# Patient Record
Sex: OTHER/UNKNOWN | URBAN - NONMETROPOLITAN AREA
[De-identification: names, ages, dates, MRNs, and addresses within clinical notes are randomized per-mention and may not be internally consistent; named-entity substitution may affect disease eponyms.]

---

## 2019-10-03 ENCOUNTER — LAB REQUISITION (OUTPATIENT)
Dept: LAB | Facility: HOSPITAL | Age: 24
End: 2019-10-03

## 2019-10-03 DIAGNOSIS — Z00.00 ROUTINE GENERAL MEDICAL EXAMINATION AT A HEALTH CARE FACILITY: ICD-10-CM

## 2019-10-03 LAB
CHOLEST SERPL-MCNC: 165 MG/DL (ref 0–200)
GLUCOSE P FAST SERPL-MCNC: 135 MG/DL (ref 65–99)
HDLC SERPL-MCNC: 38 MG/DL (ref 40–60)
LDLC SERPL CALC-MCNC: 47 MG/DL (ref 0–100)
LDLC/HDLC SERPL: 1.24 {RATIO}
TRIGL SERPL-MCNC: 399 MG/DL (ref 0–150)
VLDLC SERPL-MCNC: 79.8 MG/DL

## 2019-10-03 PROCEDURE — 80061 LIPID PANEL: CPT | Performed by: ORTHOPAEDIC SURGERY

## 2019-10-03 PROCEDURE — 82947 ASSAY GLUCOSE BLOOD QUANT: CPT | Performed by: ORTHOPAEDIC SURGERY

## 2021-07-02 ENCOUNTER — HOSPITAL ENCOUNTER (EMERGENCY)
Dept: HOSPITAL 79 - ER1 | Age: 26
Discharge: HOME | End: 2021-07-02
Payer: COMMERCIAL
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
## 2021-07-02 DIAGNOSIS — F17.200: ICD-10-CM

## 2021-07-02 DIAGNOSIS — L81.8: ICD-10-CM

## 2021-07-02 DIAGNOSIS — S50.812A: Primary | ICD-10-CM

## 2022-03-06 ENCOUNTER — HOSPITAL ENCOUNTER (EMERGENCY)
Dept: HOSPITAL 79 - ER1 | Age: 27
Discharge: HOME | End: 2022-03-06
Payer: SELF-PAY

## 2022-03-06 DIAGNOSIS — F17.200: ICD-10-CM

## 2022-03-06 DIAGNOSIS — R68.84: Primary | ICD-10-CM

## 2022-06-23 ENCOUNTER — HOSPITAL ENCOUNTER (EMERGENCY)
Facility: HOSPITAL | Age: 27
Discharge: HOME | End: 2022-06-23
Attending: STUDENT IN AN ORGANIZED HEALTH CARE EDUCATION/TRAINING PROGRAM | Admitting: STUDENT IN AN ORGANIZED HEALTH CARE EDUCATION/TRAINING PROGRAM

## 2022-06-23 VITALS
DIASTOLIC BLOOD PRESSURE: 88 MMHG | WEIGHT: 190 LBS | BODY MASS INDEX: 28.14 KG/M2 | SYSTOLIC BLOOD PRESSURE: 153 MMHG | HEIGHT: 69 IN | HEART RATE: 85 BPM | TEMPERATURE: 98 F | RESPIRATION RATE: 16 BRPM | OXYGEN SATURATION: 98 %

## 2022-06-23 DIAGNOSIS — F20.9 SCHIZOPHRENIA, UNSPECIFIED TYPE: Primary | ICD-10-CM

## 2022-06-23 LAB
AMPHET+METHAMPHET UR QL: NEGATIVE
AMPHETAMINES UR QL: NEGATIVE
BARBITURATES UR QL SCN: NEGATIVE
BENZODIAZ UR QL SCN: NEGATIVE
BILIRUB UR QL STRIP: NEGATIVE
BUPRENORPHINE SERPL-MCNC: NEGATIVE NG/ML
CANNABINOIDS SERPL QL: POSITIVE
CLARITY UR: CLEAR
COCAINE UR QL: NEGATIVE
COLOR UR: ABNORMAL
FLUAV SUBTYP SPEC NAA+PROBE: NOT DETECTED
FLUBV RNA ISLT QL NAA+PROBE: NOT DETECTED
GLUCOSE UR STRIP-MCNC: NEGATIVE MG/DL
HGB UR QL STRIP.AUTO: NEGATIVE
KETONES UR QL STRIP: ABNORMAL
LEUKOCYTE ESTERASE UR QL STRIP.AUTO: NEGATIVE
METHADONE UR QL SCN: NEGATIVE
NITRITE UR QL STRIP: NEGATIVE
OPIATES UR QL: NEGATIVE
OXYCODONE UR QL SCN: NEGATIVE
PCP UR QL SCN: NEGATIVE
PH UR STRIP.AUTO: 6 [PH] (ref 5–8)
PROPOXYPH UR QL: NEGATIVE
PROT UR QL STRIP: NEGATIVE
SARS-COV-2 RNA PNL SPEC NAA+PROBE: NOT DETECTED
SP GR UR STRIP: 1.02 (ref 1–1.03)
TRICYCLICS UR QL SCN: NEGATIVE
UROBILINOGEN UR QL STRIP: ABNORMAL

## 2022-06-23 PROCEDURE — C9803 HOPD COVID-19 SPEC COLLECT: HCPCS | Performed by: PHYSICIAN ASSISTANT

## 2022-06-23 PROCEDURE — 99283 EMERGENCY DEPT VISIT LOW MDM: CPT

## 2022-06-23 PROCEDURE — 80306 DRUG TEST PRSMV INSTRMNT: CPT | Performed by: PHYSICIAN ASSISTANT

## 2022-06-23 PROCEDURE — 87636 SARSCOV2 & INF A&B AMP PRB: CPT | Performed by: PHYSICIAN ASSISTANT

## 2022-06-23 PROCEDURE — 81003 URINALYSIS AUTO W/O SCOPE: CPT | Performed by: PHYSICIAN ASSISTANT

## 2022-06-23 NOTE — NURSING NOTE
Called and spoke to . Intake information provided. Instructed that he does not need admitted and to attempt to call the [facility] for an appt for him to be seen outpatient. [id number]

## 2022-06-23 NOTE — NURSING NOTE
Med asst talking with patient at this time. Patient states that the insurance is his big problem to pay for outpatient tx and medication. He states that he was trying to work a little bit. But has not worked in a while.

## 2022-06-23 NOTE — NURSING NOTE
Spoke with patient. He states that he knows that he needs to see a therapist to get his meds and states he only has medicare part A and the only way his insurance will pay for him to get back on meds is if he goes inpatient. It does not cover outpatient and cant afford it. States that is the reason he signed into the ER. Informed patient that in order to get admitted inpt he has to meet admission criteria and for med management that is done outpatient. Appt for July is made and offered to let him talk to med asst. Informed patient that the provider is not admitting him and encouraged him to keep this appt or contact [facility] care if he wishes to.

## 2022-06-23 NOTE — NURSING NOTE
Spoke to the [facility]. For medication the appts are booked till October. He will need to see a therapist first. Appt set for July 5th 1pm with  alyssia lott. Information verbalized. Will provider appt information to the patient.

## 2022-06-23 NOTE — NURSING NOTE
Patient presents to intake and states that he would like to get help to get back on medications. He state that he lost his insurance a little over a years ago and was going to comp care and just had to quit going and he has been off all his meds for that long. He recently got insurance and would like to get started back on his meds but does not want to use comp care so he came to the [facility] to get started back on them. He was on Zyprexa, Zoloft, methamphetamine and Buspar. Patient is calm at this time. No reports of SI or HI or AVH. He says that sometimes he has bad paranoia and has trouble knowing what is real and what is not. And depression is up and down. But denies anything today. Patient states that he would like to get started with the [facility] to get on medication but does not know how to do it.

## 2022-06-23 NOTE — ED PROVIDER NOTES
Subjective   27-year-old white male presents secondary to need for psychiatric medications.  Patient states he has been off his psych medications for quite a while.  He states he has bipolar and schizophrenia.  He states that he has no suicidal homicidal ideation.  Denies any hallucinations.  He states he is very anxious.  He does not wish to be admitted to the hospital.  He denies any medical complaints.  No exposure to COVID or COVID symptoms.  His symptoms mild to moderate.  No relieving or exacerbating factors otherwise.          Review of Systems   Constitutional: Negative.  Negative for fever.   HENT: Negative.    Respiratory: Negative.    Cardiovascular: Negative.  Negative for chest pain.   Gastrointestinal: Negative.  Negative for abdominal pain.   Endocrine: Negative.    Genitourinary: Negative.  Negative for dysuria.   Skin: Negative.    Neurological: Negative.    Psychiatric/Behavioral: Negative.  Negative for suicidal ideas.   All other systems reviewed and are negative.      Past Medical History:   Diagnosis Date   • ADHD (attention deficit hyperactivity disorder)    • Bipolar disorder (HCC)    • PTSD (post-traumatic stress disorder)    • Schizoaffective disorder (HCC)        No Known Allergies    No past surgical history on file.    No family history on file.    Social History     Socioeconomic History   • Marital status: Single   Tobacco Use   • Smoking status: Current Every Day Smoker     Years: 10.00     Types: Cigarettes   • Smokeless tobacco: Never Used   Vaping Use   • Vaping Use: Never used   Substance and Sexual Activity   • Alcohol use: Yes     Comment: occaisional use   • Drug use: Not Currently   • Sexual activity: Yes     Partners: Female     Birth control/protection: None           Objective   Physical Exam  Vitals and nursing note reviewed.   Constitutional:       General: He is not in acute distress.     Appearance: He is well-developed. He is not diaphoretic.   HENT:      Head:  Normocephalic and atraumatic.      Right Ear: External ear normal.      Left Ear: External ear normal.      Nose: Nose normal.   Eyes:      Conjunctiva/sclera: Conjunctivae normal.      Pupils: Pupils are equal, round, and reactive to light.   Neck:      Vascular: No JVD.      Trachea: No tracheal deviation.   Cardiovascular:      Rate and Rhythm: Normal rate and regular rhythm.      Heart sounds: Normal heart sounds. No murmur heard.  Pulmonary:      Effort: Pulmonary effort is normal. No respiratory distress.      Breath sounds: Normal breath sounds. No wheezing.   Abdominal:      General: Bowel sounds are normal.      Palpations: Abdomen is soft.      Tenderness: There is no abdominal tenderness.   Musculoskeletal:         General: No deformity. Normal range of motion.      Cervical back: Normal range of motion and neck supple.   Skin:     General: Skin is warm and dry.      Coloration: Skin is not pale.      Findings: No erythema or rash.   Neurological:      Mental Status: He is alert and oriented to person, place, and time.      Cranial Nerves: No cranial nerve deficit.   Psychiatric:         Behavior: Behavior normal.         Thought Content: Thought content normal. Thought content does not include homicidal or suicidal ideation.         Procedures           ED Course                                           MDM  Number of Diagnoses or Management Options  Schizophrenia, unspecified type (HCC): established and worsening     Amount and/or Complexity of Data Reviewed  Clinical lab tests: ordered and reviewed        Final diagnoses:   Schizophrenia, unspecified type (HCC)       ED Disposition  ED Disposition     ED Disposition   Discharge    Condition   Stable    Comment   --             CHARLENE CLEMENTS  [address] [place] [id number]  [phone]  Schedule an appointment as soon as possible for a visit on 7/5/2022  at 1 pm         Medication List      No changes were made to your prescriptions  during this visit.          Ayad Zee PA  06/23/22 1200

## 2023-01-13 ENCOUNTER — HOSPITAL ENCOUNTER (EMERGENCY)
Facility: HOSPITAL | Age: 28
Discharge: PSYCHIATRIC OR SUBSTANCE USE FACILITY | End: 2023-01-14
Attending: EMERGENCY MEDICINE | Admitting: EMERGENCY MEDICINE

## 2023-01-13 DIAGNOSIS — F29 PSYCHOSIS, UNSPECIFIED PSYCHOSIS TYPE: Primary | ICD-10-CM

## 2023-01-13 DIAGNOSIS — R45.851 SUICIDAL IDEATIONS: ICD-10-CM

## 2023-01-13 LAB
ALBUMIN SERPL-MCNC: 4.8 G/DL (ref 3.5–5.2)
ALBUMIN/GLOB SERPL: 1.8 G/DL
ALP SERPL-CCNC: 128 U/L (ref 39–117)
ALT SERPL W P-5'-P-CCNC: 69 U/L (ref 1–41)
AMPHET+METHAMPHET UR QL: NEGATIVE
AMPHETAMINES UR QL: NEGATIVE
ANION GAP SERPL CALCULATED.3IONS-SCNC: 16 MMOL/L (ref 5–15)
AST SERPL-CCNC: 36 U/L (ref 1–40)
BARBITURATES UR QL SCN: NEGATIVE
BASOPHILS # BLD AUTO: 0.04 10*3/MM3 (ref 0–0.2)
BASOPHILS NFR BLD AUTO: 0.4 % (ref 0–1.5)
BENZODIAZ UR QL SCN: NEGATIVE
BILIRUB SERPL-MCNC: 0.3 MG/DL (ref 0–1.2)
BILIRUB UR QL STRIP: NEGATIVE
BUN SERPL-MCNC: 10 MG/DL (ref 6–20)
BUN/CREAT SERPL: 11.2 (ref 7–25)
BUPRENORPHINE SERPL-MCNC: NEGATIVE NG/ML
CALCIUM SPEC-SCNC: 9.6 MG/DL (ref 8.6–10.5)
CANNABINOIDS SERPL QL: NEGATIVE
CHLORIDE SERPL-SCNC: 101 MMOL/L (ref 98–107)
CLARITY UR: CLEAR
CO2 SERPL-SCNC: 20 MMOL/L (ref 22–29)
COCAINE UR QL: NEGATIVE
COLOR UR: YELLOW
CREAT SERPL-MCNC: 0.89 MG/DL (ref 0.76–1.27)
DEPRECATED RDW RBC AUTO: 38.4 FL (ref 37–54)
EGFRCR SERPLBLD CKD-EPI 2021: 122 ML/MIN/1.73
EOSINOPHIL # BLD AUTO: 0.12 10*3/MM3 (ref 0–0.4)
EOSINOPHIL NFR BLD AUTO: 1.3 % (ref 0.3–6.2)
ERYTHROCYTE [DISTWIDTH] IN BLOOD BY AUTOMATED COUNT: 11.9 % (ref 12.3–15.4)
ETHANOL BLD-MCNC: 190 MG/DL (ref 0–10)
ETHANOL UR QL: 0.19 %
FLUAV RNA RESP QL NAA+PROBE: NOT DETECTED
FLUBV RNA RESP QL NAA+PROBE: NOT DETECTED
GLOBULIN UR ELPH-MCNC: 2.7 GM/DL
GLUCOSE SERPL-MCNC: 122 MG/DL (ref 65–99)
GLUCOSE UR STRIP-MCNC: NEGATIVE MG/DL
HCT VFR BLD AUTO: 47.6 % (ref 37.5–51)
HGB BLD-MCNC: 16.5 G/DL (ref 13–17.7)
HGB UR QL STRIP.AUTO: NEGATIVE
IMM GRANULOCYTES # BLD AUTO: 0.03 10*3/MM3 (ref 0–0.05)
IMM GRANULOCYTES NFR BLD AUTO: 0.3 % (ref 0–0.5)
KETONES UR QL STRIP: NEGATIVE
LEUKOCYTE ESTERASE UR QL STRIP.AUTO: NEGATIVE
LYMPHOCYTES # BLD AUTO: 3.36 10*3/MM3 (ref 0.7–3.1)
LYMPHOCYTES NFR BLD AUTO: 36.6 % (ref 19.6–45.3)
MAGNESIUM SERPL-MCNC: 2.1 MG/DL (ref 1.6–2.6)
MCH RBC QN AUTO: 31 PG (ref 26.6–33)
MCHC RBC AUTO-ENTMCNC: 34.7 G/DL (ref 31.5–35.7)
MCV RBC AUTO: 89.3 FL (ref 79–97)
METHADONE UR QL SCN: NEGATIVE
MONOCYTES # BLD AUTO: 0.52 10*3/MM3 (ref 0.1–0.9)
MONOCYTES NFR BLD AUTO: 5.7 % (ref 5–12)
NEUTROPHILS NFR BLD AUTO: 5.11 10*3/MM3 (ref 1.7–7)
NEUTROPHILS NFR BLD AUTO: 55.7 % (ref 42.7–76)
NITRITE UR QL STRIP: NEGATIVE
NRBC BLD AUTO-RTO: 0 /100 WBC (ref 0–0.2)
OPIATES UR QL: NEGATIVE
OXYCODONE UR QL SCN: NEGATIVE
PCP UR QL SCN: NEGATIVE
PH UR STRIP.AUTO: 6 [PH] (ref 5–8)
PLATELET # BLD AUTO: 259 10*3/MM3 (ref 140–450)
PMV BLD AUTO: 9.3 FL (ref 6–12)
POTASSIUM SERPL-SCNC: 3.8 MMOL/L (ref 3.5–5.2)
PROPOXYPH UR QL: NEGATIVE
PROT SERPL-MCNC: 7.5 G/DL (ref 6–8.5)
PROT UR QL STRIP: NEGATIVE
RBC # BLD AUTO: 5.33 10*6/MM3 (ref 4.14–5.8)
SARS-COV-2 RNA RESP QL NAA+PROBE: NOT DETECTED
SODIUM SERPL-SCNC: 137 MMOL/L (ref 136–145)
SP GR UR STRIP: <=1.005 (ref 1–1.03)
TRICYCLICS UR QL SCN: NEGATIVE
UROBILINOGEN UR QL STRIP: NORMAL
WBC NRBC COR # BLD: 9.18 10*3/MM3 (ref 3.4–10.8)

## 2023-01-13 PROCEDURE — 99285 EMERGENCY DEPT VISIT HI MDM: CPT

## 2023-01-13 PROCEDURE — 80053 COMPREHEN METABOLIC PANEL: CPT | Performed by: EMERGENCY MEDICINE

## 2023-01-13 PROCEDURE — 36415 COLL VENOUS BLD VENIPUNCTURE: CPT

## 2023-01-13 PROCEDURE — 25010000002 LORAZEPAM PER 2 MG: Performed by: FAMILY MEDICINE

## 2023-01-13 PROCEDURE — 96372 THER/PROPH/DIAG INJ SC/IM: CPT

## 2023-01-13 PROCEDURE — 81003 URINALYSIS AUTO W/O SCOPE: CPT | Performed by: EMERGENCY MEDICINE

## 2023-01-13 PROCEDURE — 87636 SARSCOV2 & INF A&B AMP PRB: CPT | Performed by: EMERGENCY MEDICINE

## 2023-01-13 PROCEDURE — C9803 HOPD COVID-19 SPEC COLLECT: HCPCS

## 2023-01-13 PROCEDURE — 83735 ASSAY OF MAGNESIUM: CPT | Performed by: EMERGENCY MEDICINE

## 2023-01-13 PROCEDURE — 85025 COMPLETE CBC W/AUTO DIFF WBC: CPT | Performed by: EMERGENCY MEDICINE

## 2023-01-13 PROCEDURE — 25010000002 DIPHENHYDRAMINE PER 50 MG: Performed by: FAMILY MEDICINE

## 2023-01-13 PROCEDURE — 80306 DRUG TEST PRSMV INSTRMNT: CPT | Performed by: EMERGENCY MEDICINE

## 2023-01-13 PROCEDURE — 25010000002 HALOPERIDOL LACTATE PER 5 MG: Performed by: FAMILY MEDICINE

## 2023-01-13 PROCEDURE — 82077 ASSAY SPEC XCP UR&BREATH IA: CPT | Performed by: EMERGENCY MEDICINE

## 2023-01-13 RX ORDER — LORAZEPAM 2 MG/ML
2 INJECTION INTRAMUSCULAR ONCE
Status: COMPLETED | OUTPATIENT
Start: 2023-01-13 | End: 2023-01-13

## 2023-01-13 RX ORDER — HALOPERIDOL 5 MG/ML
5 INJECTION INTRAMUSCULAR ONCE
Status: COMPLETED | OUTPATIENT
Start: 2023-01-13 | End: 2023-01-13

## 2023-01-13 RX ORDER — DIPHENHYDRAMINE HYDROCHLORIDE 50 MG/ML
50 INJECTION INTRAMUSCULAR; INTRAVENOUS ONCE
Status: COMPLETED | OUTPATIENT
Start: 2023-01-13 | End: 2023-01-13

## 2023-01-13 RX ADMIN — DIPHENHYDRAMINE HYDROCHLORIDE 50 MG: 50 INJECTION INTRAMUSCULAR; INTRAVENOUS at 21:54

## 2023-01-13 RX ADMIN — HALOPERIDOL LACTATE 5 MG: 5 INJECTION, SOLUTION INTRAMUSCULAR at 21:54

## 2023-01-13 RX ADMIN — LORAZEPAM 2 MG: 2 INJECTION INTRAMUSCULAR; INTRAVENOUS at 21:54

## 2023-01-14 ENCOUNTER — HOSPITAL ENCOUNTER (INPATIENT)
Facility: HOSPITAL | Age: 28
LOS: 3 days | Discharge: HOME | DRG: 885 | End: 2023-01-17
Attending: STUDENT IN AN ORGANIZED HEALTH CARE EDUCATION/TRAINING PROGRAM | Admitting: STUDENT IN AN ORGANIZED HEALTH CARE EDUCATION/TRAINING PROGRAM
Payer: MEDICARE

## 2023-01-14 VITALS
HEART RATE: 95 BPM | WEIGHT: 210 LBS | SYSTOLIC BLOOD PRESSURE: 114 MMHG | TEMPERATURE: 97.8 F | RESPIRATION RATE: 18 BRPM | HEIGHT: 70 IN | DIASTOLIC BLOOD PRESSURE: 75 MMHG | BODY MASS INDEX: 30.06 KG/M2 | OXYGEN SATURATION: 96 %

## 2023-01-14 PROBLEM — R45.851 SUICIDAL IDEATION: Status: ACTIVE | Noted: 2023-01-14

## 2023-01-14 LAB
ETHANOL BLD-MCNC: 128 MG/DL (ref 0–10)
ETHANOL BLD-MCNC: 83 MG/DL (ref 0–10)
ETHANOL UR QL: 0.08 %
ETHANOL UR QL: 0.13 %
QT INTERVAL: 372 MS
QTC INTERVAL: 415 MS

## 2023-01-14 PROCEDURE — 93010 ELECTROCARDIOGRAM REPORT: CPT | Performed by: SPECIALIST

## 2023-01-14 PROCEDURE — HZ2ZZZZ DETOXIFICATION SERVICES FOR SUBSTANCE ABUSE TREATMENT: ICD-10-PCS | Performed by: PSYCHIATRY & NEUROLOGY

## 2023-01-14 PROCEDURE — 82077 ASSAY SPEC XCP UR&BREATH IA: CPT | Performed by: EMERGENCY MEDICINE

## 2023-01-14 PROCEDURE — 99223 1ST HOSP IP/OBS HIGH 75: CPT | Performed by: PSYCHIATRY & NEUROLOGY

## 2023-01-14 PROCEDURE — 93005 ELECTROCARDIOGRAM TRACING: CPT | Performed by: STUDENT IN AN ORGANIZED HEALTH CARE EDUCATION/TRAINING PROGRAM

## 2023-01-14 RX ORDER — HYDROXYZINE HYDROCHLORIDE 25 MG/1
50 TABLET, FILM COATED ORAL EVERY 6 HOURS PRN
Status: DISCONTINUED | OUTPATIENT
Start: 2023-01-14 | End: 2023-01-17 | Stop reason: HOSPADM

## 2023-01-14 RX ORDER — IBUPROFEN 400 MG/1
400 TABLET ORAL EVERY 6 HOURS PRN
Status: DISCONTINUED | OUTPATIENT
Start: 2023-01-14 | End: 2023-01-17 | Stop reason: HOSPADM

## 2023-01-14 RX ORDER — MULTIVITAMIN WITH IRON
2 TABLET ORAL DAILY
Status: DISCONTINUED | OUTPATIENT
Start: 2023-01-14 | End: 2023-01-17 | Stop reason: HOSPADM

## 2023-01-14 RX ORDER — BENZTROPINE MESYLATE 1 MG/1
2 TABLET ORAL ONCE AS NEEDED
Status: DISCONTINUED | OUTPATIENT
Start: 2023-01-14 | End: 2023-01-17 | Stop reason: HOSPADM

## 2023-01-14 RX ORDER — LORAZEPAM 1 MG/1
1 TABLET ORAL
Status: DISCONTINUED | OUTPATIENT
Start: 2023-01-17 | End: 2023-01-16

## 2023-01-14 RX ORDER — MULTIPLE VITAMINS W/ MINERALS TAB 9MG-400MCG
1 TAB ORAL DAILY
Status: DISCONTINUED | OUTPATIENT
Start: 2023-01-14 | End: 2023-01-17 | Stop reason: HOSPADM

## 2023-01-14 RX ORDER — ONDANSETRON 4 MG/1
4 TABLET, FILM COATED ORAL EVERY 6 HOURS PRN
Status: DISCONTINUED | OUTPATIENT
Start: 2023-01-14 | End: 2023-01-17 | Stop reason: HOSPADM

## 2023-01-14 RX ORDER — ECHINACEA PURPUREA EXTRACT 125 MG
2 TABLET ORAL AS NEEDED
Status: DISCONTINUED | OUTPATIENT
Start: 2023-01-14 | End: 2023-01-17 | Stop reason: HOSPADM

## 2023-01-14 RX ORDER — LORAZEPAM 2 MG/1
2 TABLET ORAL
Status: DISPENSED | OUTPATIENT
Start: 2023-01-15 | End: 2023-01-16

## 2023-01-14 RX ORDER — ALUMINA, MAGNESIA, AND SIMETHICONE 2400; 2400; 240 MG/30ML; MG/30ML; MG/30ML
15 SUSPENSION ORAL EVERY 6 HOURS PRN
Status: DISCONTINUED | OUTPATIENT
Start: 2023-01-14 | End: 2023-01-17 | Stop reason: HOSPADM

## 2023-01-14 RX ORDER — BENZTROPINE MESYLATE 1 MG/ML
1 INJECTION INTRAMUSCULAR; INTRAVENOUS ONCE AS NEEDED
Status: DISCONTINUED | OUTPATIENT
Start: 2023-01-14 | End: 2023-01-17 | Stop reason: HOSPADM

## 2023-01-14 RX ORDER — LORAZEPAM 2 MG/1
2 TABLET ORAL EVERY 4 HOURS PRN
Status: ACTIVE | OUTPATIENT
Start: 2023-01-15 | End: 2023-01-16

## 2023-01-14 RX ORDER — TRAZODONE HYDROCHLORIDE 50 MG/1
50 TABLET ORAL NIGHTLY PRN
Status: DISCONTINUED | OUTPATIENT
Start: 2023-01-14 | End: 2023-01-17 | Stop reason: HOSPADM

## 2023-01-14 RX ORDER — FAMOTIDINE 20 MG/1
20 TABLET, FILM COATED ORAL 2 TIMES DAILY PRN
Status: DISCONTINUED | OUTPATIENT
Start: 2023-01-14 | End: 2023-01-17 | Stop reason: HOSPADM

## 2023-01-14 RX ORDER — NICOTINE 21 MG/24HR
1 PATCH, TRANSDERMAL 24 HOURS TRANSDERMAL
Status: DISCONTINUED | OUTPATIENT
Start: 2023-01-14 | End: 2023-01-17 | Stop reason: HOSPADM

## 2023-01-14 RX ORDER — LOPERAMIDE HYDROCHLORIDE 2 MG/1
2 CAPSULE ORAL
Status: DISCONTINUED | OUTPATIENT
Start: 2023-01-14 | End: 2023-01-17 | Stop reason: HOSPADM

## 2023-01-14 RX ORDER — LORAZEPAM 0.5 MG/1
0.5 TABLET ORAL EVERY 4 HOURS PRN
Status: DISCONTINUED | OUTPATIENT
Start: 2023-01-18 | End: 2023-01-16

## 2023-01-14 RX ORDER — LORAZEPAM 2 MG/1
2 TABLET ORAL
Status: DISPENSED | OUTPATIENT
Start: 2023-01-14 | End: 2023-01-15

## 2023-01-14 RX ORDER — BENZONATATE 100 MG/1
100 CAPSULE ORAL 3 TIMES DAILY PRN
Status: DISCONTINUED | OUTPATIENT
Start: 2023-01-14 | End: 2023-01-17 | Stop reason: HOSPADM

## 2023-01-14 RX ORDER — LORAZEPAM 1 MG/1
1 TABLET ORAL EVERY 4 HOURS PRN
Status: DISCONTINUED | OUTPATIENT
Start: 2023-01-17 | End: 2023-01-16

## 2023-01-14 RX ORDER — ACETAMINOPHEN 325 MG/1
650 TABLET ORAL EVERY 6 HOURS PRN
Status: DISCONTINUED | OUTPATIENT
Start: 2023-01-14 | End: 2023-01-14

## 2023-01-14 RX ORDER — LORAZEPAM 0.5 MG/1
0.5 TABLET ORAL
Status: DISCONTINUED | OUTPATIENT
Start: 2023-01-18 | End: 2023-01-16

## 2023-01-14 RX ADMIN — LORAZEPAM 2 MG: 2 TABLET ORAL at 15:11

## 2023-01-14 RX ADMIN — LORAZEPAM 2 MG: 2 TABLET ORAL at 20:17

## 2023-01-14 RX ADMIN — TRAZODONE HYDROCHLORIDE 50 MG: 50 TABLET ORAL at 20:17

## 2023-01-14 RX ADMIN — Medication 1 TABLET: at 09:20

## 2023-01-14 RX ADMIN — NICOTINE TRANSDERMAL SYSTEM 1 PATCH: 21 PATCH, EXTENDED RELEASE TRANSDERMAL at 09:20

## 2023-01-14 RX ADMIN — Medication 2 TABLET: at 09:19

## 2023-01-14 RX ADMIN — Medication 100 MG: at 09:20

## 2023-01-14 NOTE — PLAN OF CARE
Goal Outcome Evaluation:  Plan of Care Reviewed With: patient  Patient Agreement with Plan of Care: agrees        Outcome Evaluation: Pt new admission this shift.  Pt  begin on ativan detox this shift no complaints.

## 2023-01-14 NOTE — NURSING NOTE
Attempted to search pt per hospital policy, pt became belligerent with staff and began cursing staff. Attempted to deescalate patients behavior. Patient then threw his hat at Security and jumped toward them attempting to throw punches. Patient was placed in a brief hold and medications were given per ED provider order.     Hold began at 2154.

## 2023-01-14 NOTE — NURSING NOTE
Spoke to Dr. Krishnan via phone. Intake information provided. Instructed to admit the patient. Admit orders received. SP3 routine orders with [facility]WA. [id number]. Patient and ED provider Miguel A made aware of plan of care. Safety precautions maintained.

## 2023-01-14 NOTE — NURSING NOTE
Brief hold ended at this time. There are no s/s of injury and no complaints at this time. Patient is apologetic with staff and reports he knows why he was placed in a hold.

## 2023-01-14 NOTE — ED PROVIDER NOTES
"Subjective   History of Present Illness  Patient is a 27-year-old male who reports a psychiatric history, possibly schizophrenia, states he has not he has not been on any of his medications \"for a long time\", at least several months, as he has no health insurance and he cannot afford them.  He states that he has been having thoughts of harming or killing himself, has had thoughts of harming others but no person in particular, admits to auditory hallucinations, possibly visual hallucinations.  I am not exactly sure of the circumstances of his arrival here, but he tells me that he called the police because he felt like he was a danger to himself and possibly others, and that the police made him come here.  Upon his arrival, before I saw him, the patient was very agitated and combative and had to be briefly restrained by security.  He is much calmer and more cooperative a few minutes later when I see him.  He admits to alcohol use, does not quantitate.  He denies drug use.  He denies any other symptoms or complaints.        Review of Systems   All other systems reviewed and are negative.      Past Medical History:   Diagnosis Date   • ADHD (attention deficit hyperactivity disorder)    • Bipolar disorder (HCC)    • Psychiatric illness    • PTSD (post-traumatic stress disorder)    • Schizoaffective disorder (HCC)    • Suicide attempt (HCC)        No Known Allergies    Past Surgical History:   Procedure Laterality Date   • TONSILLECTOMY AND ADENOIDECTOMY         Family History   Problem Relation Age of Onset   • Depression Father        Social History     Socioeconomic History   • Marital status: Single   • Number of children: 1   • Years of education: ged   Tobacco Use   • Smoking status: Every Day     Packs/day: 1.00     Years: 10.00     Pack years: 10.00     Types: Cigarettes   • Smokeless tobacco: Never   Vaping Use   • Vaping Use: Never used   Substance and Sexual Activity   • Alcohol use: Yes     Comment: 5th " whiskey every 1-2 days   • Drug use: Not Currently     Types: Marijuana   • Sexual activity: Not Currently     Partners: Female     Birth control/protection: None           Objective   Physical Exam  Vitals and nursing note reviewed.   Constitutional:       General: He is not in acute distress.     Appearance: He is well-developed. He is not toxic-appearing or diaphoretic.      Comments: Well-developed well-nourished male, awake and alert.  He appears to be a bit agitated and potentially volatile.  However, he is seated and cooperative with history and physical exam.   HENT:      Head: Normocephalic and atraumatic.   Eyes:      General: No scleral icterus.     Pupils: Pupils are equal, round, and reactive to light.   Neck:      Trachea: No tracheal deviation.   Cardiovascular:      Rate and Rhythm: Normal rate and regular rhythm.   Pulmonary:      Effort: Pulmonary effort is normal. No respiratory distress.      Breath sounds: Normal breath sounds.   Chest:      Chest wall: No tenderness.   Abdominal:      General: Bowel sounds are normal.      Palpations: Abdomen is soft.      Tenderness: There is no abdominal tenderness. There is no guarding or rebound.   Musculoskeletal:         General: No tenderness. Normal range of motion.      Cervical back: Normal range of motion and neck supple. No rigidity or tenderness.      Right lower leg: No edema.      Left lower leg: No edema.   Skin:     General: Skin is warm and dry.      Capillary Refill: Capillary refill takes less than 2 seconds.      Coloration: Skin is not pale.   Neurological:      General: No focal deficit present.      Mental Status: He is alert and oriented to person, place, and time.      GCS: GCS eye subscore is 4. GCS verbal subscore is 5. GCS motor subscore is 6.      Motor: No abnormal muscle tone.   Psychiatric:      Comments: Mental status is described above.         Procedures  Results for orders placed or performed during the hospital encounter of  01/13/23   COVID-19 and FLU A/B PCR - Swab, Nasopharynx    Specimen: Nasopharynx; Swab   Result Value Ref Range    COVID19 Not Detected Not Detected - Ref. Range    Influenza A PCR Not Detected Not Detected    Influenza B PCR Not Detected Not Detected   Comprehensive Metabolic Panel    Specimen: Arm, Right; Blood   Result Value Ref Range    Glucose 122 (H) 65 - 99 mg/dL    BUN 10 6 - 20 mg/dL    Creatinine 0.89 0.76 - 1.27 mg/dL    Sodium 137 136 - 145 mmol/L    Potassium 3.8 3.5 - 5.2 mmol/L    Chloride 101 98 - 107 mmol/L    CO2 20.0 (L) 22.0 - 29.0 mmol/L    Calcium 9.6 8.6 - 10.5 mg/dL    Total Protein 7.5 6.0 - 8.5 g/dL    Albumin 4.8 3.5 - 5.2 g/dL    ALT (SGPT) 69 (H) 1 - 41 U/L    AST (SGOT) 36 1 - 40 U/L    Alkaline Phosphatase 128 (H) 39 - 117 U/L    Total Bilirubin 0.3 0.0 - 1.2 mg/dL    Globulin 2.7 gm/dL    A/G Ratio 1.8 g/dL    BUN/Creatinine Ratio 11.2 7.0 - 25.0    Anion Gap 16.0 (H) 5.0 - 15.0 mmol/L    eGFR 122.0 >60.0 mL/min/1.73   Urinalysis With Microscopic If Indicated (No Culture) - Urine, Clean Catch    Specimen: Urine, Clean Catch   Result Value Ref Range    Color, UA Yellow Yellow, Straw    Appearance, UA Clear Clear    pH, UA 6.0 5.0 - 8.0    Specific Gravity, UA <=1.005 1.005 - 1.030    Glucose, UA Negative Negative    Ketones, UA Negative Negative    Bilirubin, UA Negative Negative    Blood, UA Negative Negative    Protein, UA Negative Negative    Leuk Esterase, UA Negative Negative    Nitrite, UA Negative Negative    Urobilinogen, UA 0.2 E.U./dL 0.2 - 1.0 E.U./dL   Urine Drug Screen - Urine, Clean Catch    Specimen: Urine, Clean Catch   Result Value Ref Range    THC, Screen, Urine Negative Negative    Phencyclidine (PCP), Urine Negative Negative    Cocaine Screen, Urine Negative Negative    Methamphetamine, Ur Negative Negative    Opiate Screen Negative Negative    Amphetamine Screen, Urine Negative Negative    Benzodiazepine Screen, Urine Negative Negative    Tricyclic Antidepressants  Screen Negative Negative    Methadone Screen, Urine Negative Negative    Barbiturates Screen, Urine Negative Negative    Oxycodone Screen, Urine Negative Negative    Propoxyphene Screen Negative Negative    Buprenorphine, Screen, Urine Negative Negative   Magnesium    Specimen: Arm, Right; Blood   Result Value Ref Range    Magnesium 2.1 1.6 - 2.6 mg/dL   Ethanol    Specimen: Arm, Right; Blood   Result Value Ref Range    Ethanol 190 (H) 0 - 10 mg/dL    Ethanol % 0.190 %   CBC Auto Differential    Specimen: Arm, Right; Blood   Result Value Ref Range    WBC 9.18 3.40 - 10.80 10*3/mm3    RBC 5.33 4.14 - 5.80 10*6/mm3    Hemoglobin 16.5 13.0 - 17.7 g/dL    Hematocrit 47.6 37.5 - 51.0 %    MCV 89.3 79.0 - 97.0 fL    MCH 31.0 26.6 - 33.0 pg    MCHC 34.7 31.5 - 35.7 g/dL    RDW 11.9 (L) 12.3 - 15.4 %    RDW-SD 38.4 37.0 - 54.0 fl    MPV 9.3 6.0 - 12.0 fL    Platelets 259 140 - 450 10*3/mm3    Neutrophil % 55.7 42.7 - 76.0 %    Lymphocyte % 36.6 19.6 - 45.3 %    Monocyte % 5.7 5.0 - 12.0 %    Eosinophil % 1.3 0.3 - 6.2 %    Basophil % 0.4 0.0 - 1.5 %    Immature Grans % 0.3 0.0 - 0.5 %    Neutrophils, Absolute 5.11 1.70 - 7.00 10*3/mm3    Lymphocytes, Absolute 3.36 (H) 0.70 - 3.10 10*3/mm3    Monocytes, Absolute 0.52 0.10 - 0.90 10*3/mm3    Eosinophils, Absolute 0.12 0.00 - 0.40 10*3/mm3    Basophils, Absolute 0.04 0.00 - 0.20 10*3/mm3    Immature Grans, Absolute 0.03 0.00 - 0.05 10*3/mm3    nRBC 0.0 0.0 - 0.2 /100 WBC   Ethanol    Specimen: Arm, Right; Blood   Result Value Ref Range    Ethanol 128 (H) 0 - 10 mg/dL    Ethanol % 0.128 %   Ethanol    Specimen: Arm, Left; Blood   Result Value Ref Range    Ethanol 83 (H) 0 - 10 mg/dL    Ethanol % 0.083 %              ED Course  ED Course as of 01/14/23 0623   Sat Jan 14, 2023   0343 Psychiatry intake advises that the patient's alcohol level must be documented to be below 100.  I have a repeat level ordered for 4 AM. [CM]   0500 Alcohol level is now 83.  Patient medically clear  for psychiatry. [CM]      ED Course User Index  [CM] John Grady MD                                           [facility]    Final diagnoses:   Psychosis, unspecified psychosis type (HCC)   Suicidal ideations       ED Disposition  ED Disposition     ED Disposition   DC/Transfer to Behavioral Health    Condition   Stable    Comment   --             Please note that portions of this note were completed with a voice recognition program.            John Grady MD  01/14/23 0660

## 2023-01-14 NOTE — NURSING NOTE
2158 Brief hold over one hour face to face completed. No complains of pain, no injuries noted. resp even unlabored. Vital sign WNL. He was able to tell staff the reason for brief hold. He apologized to staff after hold was over. Dr Grady made aware.
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
## 2023-01-15 PROCEDURE — 99232 SBSQ HOSP IP/OBS MODERATE 35: CPT | Performed by: PSYCHIATRY & NEUROLOGY

## 2023-01-15 PROCEDURE — 63710000001 ONDANSETRON PER 8 MG: Performed by: STUDENT IN AN ORGANIZED HEALTH CARE EDUCATION/TRAINING PROGRAM

## 2023-01-15 RX ORDER — OLANZAPINE 5 MG/1
5 TABLET ORAL NIGHTLY
Status: DISCONTINUED | OUTPATIENT
Start: 2023-01-15 | End: 2023-01-17 | Stop reason: HOSPADM

## 2023-01-15 RX ADMIN — Medication 2 TABLET: at 08:12

## 2023-01-15 RX ADMIN — OLANZAPINE 5 MG: 5 TABLET, FILM COATED ORAL at 20:11

## 2023-01-15 RX ADMIN — LORAZEPAM 2 MG: 2 TABLET ORAL at 08:11

## 2023-01-15 RX ADMIN — Medication 100 MG: at 08:12

## 2023-01-15 RX ADMIN — LORAZEPAM 2 MG: 2 TABLET ORAL at 21:08

## 2023-01-15 RX ADMIN — NICOTINE TRANSDERMAL SYSTEM 1 PATCH: 21 PATCH, EXTENDED RELEASE TRANSDERMAL at 08:11

## 2023-01-15 RX ADMIN — IBUPROFEN 400 MG: 400 TABLET, FILM COATED ORAL at 14:06

## 2023-01-15 RX ADMIN — TRAZODONE HYDROCHLORIDE 50 MG: 50 TABLET ORAL at 21:08

## 2023-01-15 RX ADMIN — LORAZEPAM 2 MG: 2 TABLET ORAL at 14:06

## 2023-01-15 RX ADMIN — ONDANSETRON HYDROCHLORIDE 4 MG: 4 TABLET, FILM COATED ORAL at 09:29

## 2023-01-15 RX ADMIN — Medication 1 TABLET: at 08:12

## 2023-01-15 NOTE — PLAN OF CARE
Problem: Adult Behavioral Health Plan of Care  Goal: Plan of Care Review  Outcome: Ongoing, Progressing  Flowsheets  Taken 1/15/2023 1354  Progress: improving  Outcome Evaluation: PATIENT VERBALIZES MILD ANXIETY AND MODERATE DEPRESSION THIS SHIFT. CRAVING (4) AND VARIOUS S/S OF WITHDRAWAL. PATIENT IS AOX3, COOPERATIVE WITH NO S/S OF ACUTE DISTRESS NOTED  Taken 1/15/2023 0800  Plan of Care Reviewed With: patient  Patient Agreement with Plan of Care: agrees   Goal Outcome Evaluation:  Plan of Care Reviewed With: patient  Patient Agreement with Plan of Care: agrees     Progress: improving  Outcome Evaluation: PATIENT VERBALIZES MILD ANXIETY AND MODERATE DEPRESSION THIS SHIFT. CRAVING (4) AND VARIOUS S/S OF WITHDRAWAL. PATIENT IS AOX3, COOPERATIVE WITH NO S/S OF ACUTE DISTRESS NOTED

## 2023-01-15 NOTE — PLAN OF CARE
Goal Outcome Evaluation:  Plan of Care Reviewed With: patient  Patient Agreement with Plan of Care: agrees     Pt states anxiety 2, depression 2. Pt is calm and cooperative with staff, med compliant.

## 2023-01-15 NOTE — PROGRESS NOTES
"INPATIENT PSYCHIATRIC PROGRESS NOTE    Name:  Eduardo Cannon  :  1995  MRN:  [id number]  Visit Number:  [id number]  Length of stay:  1    SUBJECTIVE    CC/Focus of Exam: bipolar, alcohol    INTERVAL HISTORY:  The patient reports he is feeling some better. States he was able to sleep good last night. Reports no active withdrawals.   Depression rating 2/10  Anxiety rating 6/10  Sleep: good  Withdrawal sx: none  Cravin/10    Review of Systems   Constitutional: Negative.    Respiratory: Negative.    Cardiovascular: Negative.    Gastrointestinal: Negative.        OBJECTIVE    Temp:  [97.1 °F (36.2 °C)-99.5 °F (37.5 °C)] 97.1 °F (36.2 °C)  Heart Rate:  [] 93  Resp:  [18] 18  BP: (113-162)/(59-99) 154/95    MENTAL STATUS EXAM:  Appearance:Casually dressed, good hygeine.   Cooperation:Cooperative  Psychomotor: No psychomotor agitation/retardation, No EPS, No motor tics  Speech-normal rate, amount.  Mood \"better\"   Affect-congruent, appropriate, stable  Thought Content-goal directed, no delusional material present  Thought process-linear, organized.  Suicidality: No SI  Homicidality: No HI  Perception: No AH/VH  Insight-fair   Judgement-fair    Lab Results (last 24 hours)     ** No results found for the last 24 hours. **             Imaging Results (Last 24 Hours)     ** No results found for the last 24 hours. **             ECG/EMG Results (most recent)     Procedure Component Value Units Date/Time    ECG 12 Lead Other [[id number]] Collected: 23 0757     Updated: 23 1324     QT Interval 372 ms      QTC Interval 415 ms     Narrative:      Test Reason : Baseline Cardiac Status~  Blood Pressure :   */*   mmHG  Vent. Rate :  75 BPM     Atrial Rate :  75 BPM     P-R Int : 142 ms          QRS Dur :  88 ms      QT Int : 372 ms       P-R-T Axes :  39  65  21 degrees     QTc Int : 415 ms    Normal sinus rhythm with sinus arrhythmia  Normal ECG  No previous ECGs available  Confirmed by Jere Carvalho " (2028) on 1/14/2023 1:23:58 PM    Referred By: HANSEL           Confirmed By: Jere Carvalho           ALLERGIES: Patient has no known allergies.    Medication Review:   Scheduled Medications:  B-complex with vitamin C, 2 tablet, Oral, Daily  LORazepam, 2 mg, Oral, 3 times per day   Followed by  [START ON 1/16/2023] LORazepam, 1.5 mg, Oral, 3 times per day   Followed by  [START ON 1/17/2023] LORazepam, 1 mg, Oral, 3 times per day   Followed by  [START ON 1/18/2023] LORazepam, 0.5 mg, Oral, 3 times per day  multivitamin with minerals, 1 tablet, Oral, Daily  nicotine, 1 patch, Transdermal, Q24H  thiamine, 100 mg, Oral, Daily         PRN Medications:  •  aluminum-magnesium hydroxide-simethicone  •  benzonatate  •  benztropine **OR** benztropine  •  famotidine  •  hydrOXYzine  •  ibuprofen  •  loperamide  •  LORazepam **FOLLOWED BY** [START ON 1/16/2023] LORazepam **FOLLOWED BY** [START ON 1/17/2023] LORazepam **FOLLOWED BY** [START ON 1/18/2023] LORazepam  •  magnesium hydroxide  •  ondansetron  •  sodium chloride  •  traZODone   All medications reviewed.    ASSESSMENT & PLAN:      Suicidal ideation  -SP 3       Bipolar I disorder, most recent episode mixed, severe with psychotic features (HCC)  -Start olanzapine 5 mg bedtime       Alcohol use disorder, severe, dependence (HCC)  -Ativan detox  -Thiamine and folate       Nicotine use disorder  -Nicotine replacement    Special precautions: Special Precautions Level 3 (q15 min checks) .    Behavioral Health Treatment Plan and Problem List: I have reviewed and approved the Behavioral Health Treatment Plan and Problem list.  The patient has had a chance to review and agrees with the treatment plan.     Clinician:  Kaelyn Felipe MD  01/15/23  11:28 EST

## 2023-01-16 PROCEDURE — 99232 SBSQ HOSP IP/OBS MODERATE 35: CPT | Performed by: PSYCHIATRY & NEUROLOGY

## 2023-01-16 RX ORDER — LORAZEPAM 1 MG/1
1 TABLET ORAL EVERY 4 HOURS PRN
Status: ACTIVE | OUTPATIENT
Start: 2023-01-16 | End: 2023-01-17

## 2023-01-16 RX ORDER — LORAZEPAM 0.5 MG/1
0.5 TABLET ORAL
Status: DISCONTINUED | OUTPATIENT
Start: 2023-01-16 | End: 2023-01-17 | Stop reason: HOSPADM

## 2023-01-16 RX ORDER — LORAZEPAM 0.5 MG/1
0.5 TABLET ORAL EVERY 4 HOURS PRN
Status: DISCONTINUED | OUTPATIENT
Start: 2023-01-17 | End: 2023-01-17 | Stop reason: HOSPADM

## 2023-01-16 RX ORDER — LORAZEPAM 1 MG/1
1 TABLET ORAL
Status: COMPLETED | OUTPATIENT
Start: 2023-01-16 | End: 2023-01-16

## 2023-01-16 RX ADMIN — Medication 2 TABLET: at 08:06

## 2023-01-16 RX ADMIN — IBUPROFEN 400 MG: 400 TABLET, FILM COATED ORAL at 20:50

## 2023-01-16 RX ADMIN — LORAZEPAM 1 MG: 1 TABLET ORAL at 21:05

## 2023-01-16 RX ADMIN — OLANZAPINE 5 MG: 5 TABLET, FILM COATED ORAL at 20:07

## 2023-01-16 RX ADMIN — Medication 100 MG: at 08:07

## 2023-01-16 RX ADMIN — LORAZEPAM 1.5 MG: 0.5 TABLET ORAL at 08:07

## 2023-01-16 RX ADMIN — IBUPROFEN 400 MG: 400 TABLET, FILM COATED ORAL at 14:43

## 2023-01-16 RX ADMIN — LORAZEPAM 1 MG: 1 TABLET ORAL at 14:43

## 2023-01-16 RX ADMIN — NICOTINE TRANSDERMAL SYSTEM 1 PATCH: 21 PATCH, EXTENDED RELEASE TRANSDERMAL at 08:07

## 2023-01-16 RX ADMIN — Medication 1 TABLET: at 08:07

## 2023-01-16 RX ADMIN — TRAZODONE HYDROCHLORIDE 50 MG: 50 TABLET ORAL at 21:04

## 2023-01-16 NOTE — PLAN OF CARE
Goal Outcome Evaluation:  Plan of Care Reviewed With: patient  Patient Agreement with Plan of Care: agrees        Outcome Evaluation: Patient reports anxiety at 5.  Denies depression, SI/HI, or AVH.  Patient cooperative this shift.  No s/s of acute distress noted.

## 2023-01-16 NOTE — PLAN OF CARE
Problem: Adult Behavioral Health Plan of Care  Goal: Plan of Care Review  Outcome: Ongoing, Progressing  Flowsheets  Taken 1/16/2023 1417  Outcome Evaluation: PATIENT DENIES ANY PROBLEMS THIS SHIFT OTHER THAN POOR SLEEP THIS SHIFT. NO CRAVING OR S/S OF WITHDRAWAL. ATIVAN DETOX WAS BACKED UP.  PATIENT IS AOX3, COOPERATIVE WITH NO S/S OF ACUTE DISTRESS NOTED. PATIENT REQUESTED TO D/CHAN GUIDO. MD DECLINED.  Taken 1/16/2023 0740  Plan of Care Reviewed With: patient  Patient Agreement with Plan of Care: agrees  Taken 1/15/2023 1354  Progress: improving   Goal Outcome Evaluation:  Plan of Care Reviewed With: patient  Patient Agreement with Plan of Care: agrees     Progress: improving  Outcome Evaluation: PATIENT DENIES ANY PROBLEMS THIS SHIFT OTHER THAN POOR SLEEP THIS SHIFT. NO CRAVING OR S/S OF WITHDRAWAL. ATIVAN DETOX WAS BACKED UP.  PATIENT IS AOX3, COOPERATIVE WITH NO S/S OF ACUTE DISTRESS NOTED. PATIENT REQUESTED TO D/CHAN GUIDO. MD DECLINED.

## 2023-01-16 NOTE — PROGRESS NOTES
"INPATIENT PSYCHIATRIC PROGRESS NOTE    Name:  Eduardo Cannon  :  1995  MRN:  [id number]  Visit Number:  [id number]  Length of stay:  2    SUBJECTIVE    CC/Focus of Exam: bipolar, alcohol    INTERVAL HISTORY:  First time seeing patient.  Chart, notes, vitals, labs and EKG personally reviewed.    Patient reports difficult days and interpersonal stressors led to decompensation and SI and eventually hospitalization.  He reports some issues with his insurance and inability to get his home medication.  He is uncertain where this stands at this point.  Tolerating medication changes since arrival.  Reports SI is improving.  Participating appropriately.  Denies significant withdrawal symptoms.    Depression rating 2/10  Anxiety rating 4/10  Sleep: good  Withdrawal sx: none  Cravin/10    Review of Systems   Constitutional: Negative.    Respiratory: Negative.    Cardiovascular: Negative.    Gastrointestinal: Negative.    Musculoskeletal: Negative.    Psychiatric/Behavioral: Positive for dysphoric mood. The patient is nervous/anxious.        OBJECTIVE    Temp:  [97.1 °F (36.2 °C)-98.4 °F (36.9 °C)] 98.4 °F (36.9 °C)  Heart Rate:  [61-96] 70  Resp:  [16-18] 16  BP: (114-153)/(59-93) 121/68    MENTAL STATUS EXAM:  Appearance:Casually dressed, good hygeine.   Cooperation:Cooperative  Psychomotor: No psychomotor agitation/retardation, No EPS, No motor tics  Speech-normal rate, amount.  Mood \"getting better\"   Affect-congruent, appropriate, stable  Thought Content-goal directed, no delusional material present  Thought process-linear, organized.  Suicidality: No SI  Homicidality: No HI  Perception: No AH/VH  Insight-fair   Judgement-fair    Lab Results (last 24 hours)     ** No results found for the last 24 hours. **             Imaging Results (Last 24 Hours)     ** No results found for the last 24 hours. **             ECG/EMG Results (most recent)     Procedure Component Value Units Date/Time    ECG 12 Lead Other " [[id number]] Collected: 23 0757     Updated: 23 1324     QT Interval 372 ms      QTC Interval 415 ms     Narrative:      Test Reason : Baseline Cardiac Status~  Blood Pressure :   */*   mmHG  Vent. Rate :  75 BPM     Atrial Rate :  75 BPM     P-R Int : 142 ms          QRS Dur :  88 ms      QT Int : 372 ms       P-R-T Axes :  39  65  21 degrees     QTc Int : 415 ms    Normal sinus rhythm with sinus arrhythmia  Normal ECG  No previous ECGs available  Confirmed by Jere Caravlho () on 2023 1:23:58 PM    Referred By: HANSEL           Confirmed By: Jere Carvalho           ALLERGIES: Patient has no known allergies.    Medication Review:   Scheduled Medications:  B-complex with vitamin C, 2 tablet, Oral, Daily  LORazepam, 1.5 mg, Oral, 3 times per day   Followed by  [START ON 2023] LORazepam, 1 mg, Oral, 3 times per day   Followed by  [START ON 2023] LORazepam, 0.5 mg, Oral, 3 times per day  multivitamin with minerals, 1 tablet, Oral, Daily  nicotine, 1 patch, Transdermal, Q24H  OLANZapine, 5 mg, Oral, Nightly  thiamine, 100 mg, Oral, Daily         PRN Medications:  •  aluminum-magnesium hydroxide-simethicone  •  benzonatate  •  benztropine **OR** benztropine  •  famotidine  •  hydrOXYzine  •  ibuprofen  •  loperamide  •  [] LORazepam **FOLLOWED BY** LORazepam **FOLLOWED BY** [START ON 2023] LORazepam **FOLLOWED BY** [START ON 2023] LORazepam  •  magnesium hydroxide  •  ondansetron  •  sodium chloride  •  traZODone   All medications reviewed.    ASSESSMENT & PLAN:      Suicidal ideation  -SP 3       Bipolar I disorder, most recent episode mixed, severe with psychotic features (HCC)  -Began olanzapine 5 mg bedtime on 1/15/2023       Alcohol use disorder, severe, dependence (HCC)  -Decrease Ativan detox due to mild symptoms  -Thiamine and folate       Nicotine use disorder  -Nicotine replacement    Special precautions: Special Precautions Level 3 (q15 min checks) .    Behavioral  Health Treatment Plan and Problem List: I have reviewed and approved the Behavioral Health Treatment Plan and Problem list.  The patient has had a chance to review and agrees with the treatment plan.     Clinician:  Padilla Upton MD  01/16/23  10:09 EST

## 2023-01-16 NOTE — PLAN OF CARE
Problem: Adult Behavioral Health Plan of Care  Goal: Plan of Care Review  Outcome: Ongoing, Progressing  Flowsheets  Taken 1/16/2023 1054 by Veena Villalobos  Outcome Evaluation: Pt is asking to be transferred to the Detox unit to complete his treatment.  Taken 1/16/2023 0740 by Olaf Shaw, RN  Plan of Care Reviewed With: patient  Patient Agreement with Plan of Care: agrees  Taken 1/15/2023 1354 by Olaf Shaw RN  Progress: improving     Problem: Adult Behavioral Health Plan of Care  Goal: Patient-Specific Goal (Individualization)  Outcome: Ongoing, Progressing  Flowsheets  Taken 1/16/2023 1054  Patient-Specific Goals (Include Timeframe): Pateint to list 2-3 healthy coping skills prior to discharge and agree to appropriate follow up care  Individualized Care Needs: Individual and group therapy to focus on healthy coping skills and safety and discharge planning and follow up care.  Anxieties, Fears or Concerns: Pt expressed concerns about wanting to be transferred to the Detox unit to complete his treatment  Taken 1/16/2023 0950  Patient Personal Strengths:   expressive of emotions   expressive of needs   motivated for recovery   motivated for treatment   spiritual/Voodoo support  Patient Vulnerabilities: adverse childhood experience(s)   Goal Outcome Evaluation:    DATA:    Therapist met individually with patient this date for initial evaluation.  Introduced self as Therapist and the role of a positive therapeutic relationship; patient agreeable.  Therapist encouraged patient to speak openly and honestly about any issues or stressors during treatment stay. Therapist explained how open communication is significant to providing most effective care.       Therapist completed psychosocial assessment, integrated summary, reviewed care plans, disposition planning and discussed hospitalization expectations and treatment goals this date.      Therapist provided education regarding different levels of  "care and is recommending residential treatment . Patient declines and is requesting to se scheduled in the [facility].      Therapist is recommending family involvement for safety and disposition planning prior to discharge. Patient agreeable and signed consent for his grandmother Katie Esparza [phone].      CLINICAL MANUVERING/INTERVENTIONS:    Assisted Patient in processing session content; acknowledged and normalized patient´s thoughts, feelings, and concerns by utilizing a person-centered approach in efforts to build appropriate rapport and a positive therapeutic relationship with open and honest communication. Allowed patient to openly discuss current stressors and triggers for negative emotions and thoughts in a safe nonjudgmental environment with unconditional positive regard, active listening skills, and empathy.      ASSESSMENT: The Patient presented to the ED at [facility] where chart information indicates he was agitated, aggressive, and was given medication while in the emergency department.  Patient initially presented reporting suicidal ideations with thoughts to \"maybe hang himself.  Patient reports drinking 1/5 of whiskey 2 to 3 days/week.  Patient reports a previous psychiatric admission in 2015, he also reports outpatient treatment with comprehensive care.  Patient has a history of previous suicide attempt, no other details.  Patient reports a history of treatment for schizophrenia, ADHD, bipolar disorder, PTSD.  Patient reports hearing voices while in the emergency department.  Patient states he lives with his grandmother and has most of his life.  Patient states he came to the emergency department to get help with his medications that he is out of.  Patient states he has lost his insurance that pay for his outpatient care.      PLAN:   Patient will receive 24/7 nursing monitoring and daily psychiatrist evaluation by a multidisciplinary team.     Patient will continue " stabilization at this time.      Patient is agreeable for outpatient services with the [facility]      Public assistance with transportation will not be needed. Family member will provide.                    Outcome Evaluation: Pt is asking to be transferred to the Detox unit to complete his treatment.

## 2023-01-17 VITALS
TEMPERATURE: 96.8 F | RESPIRATION RATE: 18 BRPM | DIASTOLIC BLOOD PRESSURE: 89 MMHG | BODY MASS INDEX: 29.89 KG/M2 | OXYGEN SATURATION: 97 % | SYSTOLIC BLOOD PRESSURE: 134 MMHG | HEART RATE: 81 BPM | WEIGHT: 208.8 LBS | HEIGHT: 70 IN

## 2023-01-17 PROBLEM — R45.851 SUICIDAL IDEATION: Status: RESOLVED | Noted: 2023-01-14 | Resolved: 2023-01-17

## 2023-01-17 PROCEDURE — 99239 HOSP IP/OBS DSCHRG MGMT >30: CPT | Performed by: PSYCHIATRY & NEUROLOGY

## 2023-01-17 RX ORDER — OLANZAPINE 5 MG/1
5 TABLET ORAL NIGHTLY
Qty: 30 TABLET | Refills: 0 | Status: SHIPPED | OUTPATIENT
Start: 2023-01-17 | End: 2023-01-25

## 2023-01-17 RX ADMIN — Medication 1 TABLET: at 08:01

## 2023-01-17 RX ADMIN — LORAZEPAM 0.5 MG: 0.5 TABLET ORAL at 08:01

## 2023-01-17 RX ADMIN — Medication 2 TABLET: at 08:01

## 2023-01-17 RX ADMIN — Medication 100 MG: at 08:01

## 2023-01-17 RX ADMIN — NICOTINE TRANSDERMAL SYSTEM 1 PATCH: 21 PATCH, EXTENDED RELEASE TRANSDERMAL at 08:01

## 2023-01-17 NOTE — PROGRESS NOTES
Discharge Planning Assessment   Manohar     Patient Name: Eduardo Cannon  MRN: [id number]  Today's Date: 1/17/2023    Admit Date: 1/14/2023    Patient was discharged home this morning. Patient denies SI, HI, and AVH. This therapist was unable to meet with Patient before he left. However this therapist did call and complete safety planning with Patient's grandmother, Katie. This therapist advised that all weapons, harmful objects, and medications in the home be secured before Patient returns home. She is agreeable to do this. Patient has aftercare scheduled with [facility].     OCHOA Barba

## 2023-01-17 NOTE — PLAN OF CARE
Goal Outcome Evaluation:  Plan of Care Reviewed With: patient  Patient Agreement with Plan of Care: agrees     Progress: improving  Outcome Evaluation: Patient rated anxiety as a 1. Pt denies depression, AVH, SI, and HI. Pt has been calm and cooperative. He is hoping to go home in the morning.

## 2023-01-17 NOTE — DISCHARGE SUMMARY
"      PSYCHIATRIC DISCHARGE SUMMARY     Patient Identification:  Name:  Eduardo Cannon  Age:  27 y.o.  Sex:  male  :  1995  MRN:  [id number]  Visit Number:  [id number]    Date of Admission:2023   Date of Discharge: 2023     Discharge Diagnosis:  Active Problems:    Bipolar I disorder, most recent episode mixed, severe with psychotic features (HCC)    Alcohol use disorder, severe, dependence ([facility], located [facility])      Admission Diagnosis:  Suicidal ideation [R45.851]     Hospital Course  Patient is a 27 y.o. male presented with depression and intermittent thoughts of SI.  Admitted for crisis stabilization.  No acutely concerning labs on admission.  Ethanol 190 on arrival.  Patient placed on Ativan detox protocol, but showed minimal withdrawal symptoms so it was decreased quickly.  Patient resumed on previously successful olanzapine 5 mg nightly.  Patient reports primary reason for coming to the hospital was to get medication as he is between outpatient providers.  He is future oriented, with multiple protective factors and plans to seek a job immediately.  Family contacted for treatment and safe discharge planning.  Outpatient care ascertained.    On the day of discharge, patient denied SI, HI or AVH. Patient was stable and appropriate by the conclusion of this admission, denying significant symptoms of mood, psychotic or thought disorder. Patient showed improvement of presenting symptoms and was deemed appropriate for discharge today.    Mental Status Exam upon discharge:   Mood \"better\"   Affect-congruent, appropriate, stable  Thought Content-goal directed, no delusional material present  Thought process-linear, organized.  Suicidality: No SI  Homicidality: No HI  Perception: No AH/VH    Procedures Performed         Consults:   Consults     No orders found from 2022 to 1/15/2023.          Pertinent Test Results:   Lab Results (last 7 days)     ** No results found for the last 168 hours. **    "       Condition on Discharge:  improved    Vital Signs  Temp:  [96.8 °F (36 °C)-97.3 °F (36.3 °C)] 96.8 °F (36 °C)  Heart Rate:  [73-95] 81  Resp:  [18-20] 18  BP: (117-150)/(59-91) 134/89    Discharge Disposition:  Home or Self Care    Discharge Medications:     Discharge Medications      New Medications      Instructions Start Date   OLANZapine 5 MG tablet  Commonly known as: zyPREXA   5 mg, Oral, Nightly             Discharge Diet: Normal  Diet Instructions    REGULAR DIET           Activity at Discharge: Normal  Activity Instructions    AS TOLERATED           Follow-up Appointments  Future Appointments   Date Time Provider Department Center   1/19/2023 12:00 PM Kori Gregg LCSW [facility]   3/9/2023 11:00 AM Nelia Lopez APRN [facility]         Test Results Pending at Discharge  None     Time: I spent greater than 30 minutes on this discharge activity which included: face-to-face encounter with the patient, reviewing the data in the system, coordination of the care with the nursing staff as well as consultants, documentation, and entering orders.      Clinician:   Padilla Upton MD  01/17/23  14:28 EST

## 2023-01-19 ENCOUNTER — OFFICE VISIT (OUTPATIENT)
Dept: PSYCHIATRY | Facility: CLINIC | Age: 28
End: 2023-01-19

## 2023-01-19 DIAGNOSIS — Z86.59 HISTORY OF OCD (OBSESSIVE COMPULSIVE DISORDER): ICD-10-CM

## 2023-01-19 DIAGNOSIS — F31.60 BIPOLAR AFFECTIVE DISORDER, MIXED: Primary | ICD-10-CM

## 2023-01-19 DIAGNOSIS — F10.20 UNCOMPLICATED ALCOHOL DEPENDENCE: ICD-10-CM

## 2023-01-19 DIAGNOSIS — Z86.59 HISTORY OF POSTTRAUMATIC STRESS DISORDER (PTSD): ICD-10-CM

## 2023-01-19 DIAGNOSIS — Z62.819 HISTORY OF ABUSE IN CHILDHOOD: ICD-10-CM

## 2023-01-19 PROCEDURE — 90791 PSYCH DIAGNOSTIC EVALUATION: CPT | Performed by: SOCIAL WORKER
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
## 2023-01-23 ENCOUNTER — OFFICE VISIT (OUTPATIENT)
Dept: PSYCHIATRY | Facility: CLINIC | Age: 28
End: 2023-01-23
Payer: MEDICARE

## 2023-01-23 DIAGNOSIS — F31.64 BIPOLAR I DISORDER, MOST RECENT EPISODE MIXED, SEVERE WITH PSYCHOTIC FEATURES: ICD-10-CM

## 2023-01-23 DIAGNOSIS — F10.20 ALCOHOL USE DISORDER, SEVERE, DEPENDENCE: Primary | ICD-10-CM

## 2023-01-23 PROCEDURE — 90791 PSYCH DIAGNOSTIC EVALUATION: CPT | Performed by: SOCIAL WORKER
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
## 2023-01-25 ENCOUNTER — OFFICE VISIT (OUTPATIENT)
Dept: PSYCHIATRY | Facility: HOSPITAL | Age: 28
End: 2023-01-25

## 2023-01-25 DIAGNOSIS — Z79.899 MEDICATION MANAGEMENT: Primary | ICD-10-CM

## 2023-01-25 DIAGNOSIS — F31.64 BIPOLAR I DISORDER, MOST RECENT EPISODE MIXED, SEVERE WITH PSYCHOTIC FEATURES: ICD-10-CM

## 2023-01-25 DIAGNOSIS — F10.20 ALCOHOL USE DISORDER, SEVERE, DEPENDENCE: Primary | ICD-10-CM

## 2023-01-25 LAB
EXTERNAL AMPHETAMINE SCREEN URINE: NEGATIVE
EXTERNAL BENZODIAZEPINE SCREEN URINE: NEGATIVE
EXTERNAL BUPRENORPHINE SCREEN URINE: NEGATIVE
EXTERNAL COCAINE SCREEN URINE: NEGATIVE
EXTERNAL MDMA: NEGATIVE
EXTERNAL METHADONE SCREEN URINE: NEGATIVE
EXTERNAL METHAMPHETAMINE SCREEN URINE: NEGATIVE
EXTERNAL OPIATES SCREEN URINE: NEGATIVE
EXTERNAL OXYCODONE SCREEN URINE: NEGATIVE
EXTERNAL THC SCREEN URINE: NEGATIVE

## 2023-01-25 PROCEDURE — G0177 OPPS/PHP; TRAIN & EDUC SERV: HCPCS | Performed by: SOCIAL WORKER

## 2023-01-25 PROCEDURE — H0035 MH PARTIAL HOSP TX UNDER 24H: HCPCS | Performed by: SOCIAL WORKER

## 2023-01-25 PROCEDURE — 90792 PSYCH DIAG EVAL W/MED SRVCS: CPT | Performed by: PSYCHIATRY & NEUROLOGY

## 2023-01-25 PROCEDURE — G0410 GRP PSYCH PARTIAL HOSP 45-50: HCPCS | Performed by: SOCIAL WORKER

## 2023-01-25 RX ORDER — OLANZAPINE 10 MG/1
10 TABLET ORAL NIGHTLY
Qty: 7 TABLET | Refills: 0 | Status: SHIPPED | OUTPATIENT
Start: 2023-01-25 | End: 2023-01-26 | Stop reason: SDUPTHER

## 2023-01-25 RX ORDER — LITHIUM CARBONATE 300 MG
300 TABLET ORAL 2 TIMES DAILY
Qty: 14 TABLET | Refills: 0 | Status: SHIPPED | OUTPATIENT
Start: 2023-01-25 | End: 2023-01-26 | Stop reason: SDUPTHER
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
## 2023-01-25 NOTE — PROGRESS NOTES
NAME: Eduardo Cannon  DATE: 01/25/2023    [facility] Phase I  [id number]    Services Provided    Group Psychotherapy x 2  Education/Training x 2  Activity Therapy  x 0  Individual Therapy x 0 0 minutes  Family Therapy x 0  MD Initial Visit  x 1  MD Follow-Up   x 0    Number of participants: 3    DATA:  Patient shared with the group that he was here because he had been drinking a fifth of liquor each day. He also reported having pulled his back out. It was hurting today. Since his intake session he reported having been so-so. He also reported having used alcohol since that time. He drank a pint of liquor yesterday. He reported it seemed like when he didn't have alcohol he would begin feeling sick, and he would begin thinking about when he could get his next drink. He estimated having stayed sober for two days after being released from the psychiatric unit last Tuesday. He reflected that he had gotten depressed one day and sought out alcohol as a solution.     Regarding sleep, he estimated having slept for one hour last night. He went to bed at 6:00 AM.     He also reported having realized that when he wasn't drinking he was gambling. He suspected that he did this subconsciously in order to exhaust his resources so that he would not be able to purchase more alcohol. He reported having gambled at gas stations and at the [employer] in [place]. He lost $5,000 in one month there on one occasion.     Individual: No    Homework: None assigned    Group 1 (Psychotherapy: Check-ins): Therapist continued facilitation of rapport building strategies between group members. Therapist asked that each patient check in with home life and recovery efforts and identify triggers, cravings, and high risk situations that arise between group sessions.  Group members discussed barriers and benefits of attending recovery meetings.  Therapist provided empathy and support during group session. Daily Reading: Mere Oriental orthodox (1952) by KELTON Beltran.  Read an excerpt that discussed the idea of resisting temptation (I.e., cravings and urges).     Group 2  (Relapse Prevention) Completed the first two sections (coping skills and support system) of the relapse prevention plan found on [employer].     Group 3 (Relapse Prevention) Completed the third and final section (outcomes of relapse compared to outcomes of recovery) of the relapse prevention plan. Emphasized the importance to finding sustainable sources of motivation, not exclusively in the category of fear but also in the category of khushboo.     Group 4 (Cognitive Distortions) Viewed and discussed the video, 10 Cognitive Distortions by Therapy In A Nutshell with Miranda Roldan on YouTube.      ASSESSMENT:    Personal Assessment 0-10 Scale (10 worst)    Anxiety:  8 (no comment)  Depression:  5 (no comment)  Cravings: 8 (for alcohol)     MSE within normal limits? Yes Affect: somber Mood: depressed  Pt Response:  open/receptive and guarded  Engaged in activity/Process and self-disclosed: adequate  Applies today's group topics to self: adequate  Able to give and receive feedback: adequate  Demonstrated insightful thinking: inconsistent and moderate  Other pt response comments:  Patient was a positive participant. They demonstrated a relatively pessimistic attitude, a moderate degree of motivation, and moderate insight, as evidenced by his level of participation combined with his occasional insights. They seemed interested and attentive. They appeared to have a fair degree of comprehension regarding the concepts being discussed . The patient seemed receptive of, and somewhat willing to implement, the ideas being discussed. Their thought process appeared linear and goal directed, and their speech was regular rate and rhythm.       Community Group Engagement:  No: He believed it would be too costly in terms of gas money. He also repoted that he didn't really open up to people he did not know. He only talked with his  brother.     Reported relapse since last meeting? No: drank alcohol twice since intake session two days ago.     .  Orders Only on 01/25/2023   Component Date Value Ref Range Status   • External Amphetamine Screen Urine 01/25/2023 Negative   Final   • External Benzodiazepine Screen Uri* 01/25/2023 Negative   Final   • External Cocaine Screen Urine 01/25/2023 Negative   Final   • External THC Screen Urine 01/25/2023 Negative   Final   • External Methadone Screen Urine 01/25/2023 Negative   Final   • External Methamphetamine Screen Ur* 01/25/2023 Negative   Final   • External Oxycodone Screen Urine 01/25/2023 Negative   Final   • External Buprenorphine Screen Urine 01/25/2023 Negative   Final   • External MDMA 01/25/2023 Negative   Final   • External Opiates Screen Urine 01/25/2023 Negative   Final   Admission on 01/14/2023, Discharged on 01/17/2023   Component Date Value Ref Range Status   • QT Interval 01/14/2023 372  ms Final   • QTC Interval 01/14/2023 415  ms Final   Admission on 01/13/2023, Discharged on 01/14/2023   Component Date Value Ref Range Status   • Glucose 01/13/2023 122 (H)  65 - 99 mg/dL Final   • BUN 01/13/2023 10  6 - 20 mg/dL Final   • Creatinine 01/13/2023 0.89  0.76 - 1.27 mg/dL Final   • Sodium 01/13/2023 137  136 - 145 mmol/L Final   • Potassium 01/13/2023 3.8  3.5 - 5.2 mmol/L Final    Slight hemolysis detected by analyzer. Results may be affected.   • Chloride 01/13/2023 101  98 - 107 mmol/L Final   • CO2 01/13/2023 20.0 (L)  22.0 - 29.0 mmol/L Final   • Calcium 01/13/2023 9.6  8.6 - 10.5 mg/dL Final   • Total Protein 01/13/2023 7.5  6.0 - 8.5 g/dL Final   • Albumin 01/13/2023 4.8  3.5 - 5.2 g/dL Final   • ALT (SGPT) 01/13/2023 69 (H)  1 - 41 U/L Final   • AST (SGOT) 01/13/2023 36  1 - 40 U/L Final   • Alkaline Phosphatase 01/13/2023 128 (H)  39 - 117 U/L Final   • Total Bilirubin 01/13/2023 0.3  0.0 - 1.2 mg/dL Final   • Globulin 01/13/2023 2.7  gm/dL Final   • A/G Ratio 01/13/2023 1.8   g/dL Final   • BUN/Creatinine Ratio 01/13/2023 11.2  7.0 - 25.0 Final   • Anion Gap 01/13/2023 16.0 (H)  5.0 - 15.0 mmol/L Final   • eGFR 01/13/2023 122.0  >60.0 mL/min/1.73 Final    National Kidney Foundation and American Society of Nephrology (ASN) Task Force recommended calculation based on the Chronic Kidney Disease Epidemiology Collaboration (CKD-EPI) equation refit without adjustment for race.   • Color, UA 01/13/2023 Yellow  Yellow, Straw Final   • Appearance, UA 01/13/2023 Clear  Clear Final   • pH, UA 01/13/2023 6.0  5.0 - 8.0 Final   • Specific Gravity, UA 01/13/2023 <=1.005  1.005 - 1.030 Final   • Glucose, UA 01/13/2023 Negative  Negative Final   • Ketones, UA 01/13/2023 Negative  Negative Final   • Bilirubin, UA 01/13/2023 Negative  Negative Final   • Blood, UA 01/13/2023 Negative  Negative Final   • Protein, UA 01/13/2023 Negative  Negative Final   • Leuk Esterase, UA 01/13/2023 Negative  Negative Final   • Nitrite, UA 01/13/2023 Negative  Negative Final   • Urobilinogen, UA 01/13/2023 0.2 E.U./dL  0.2 - 1.0 E.U./dL Final   • THC, Screen, Urine 01/13/2023 Negative  Negative Final   • Phencyclidine (PCP), Urine 01/13/2023 Negative  Negative Final   • Cocaine Screen, Urine 01/13/2023 Negative  Negative Final   • Methamphetamine, Ur 01/13/2023 Negative  Negative Final   • Opiate Screen 01/13/2023 Negative  Negative Final   • Amphetamine Screen, Urine 01/13/2023 Negative  Negative Final   • Benzodiazepine Screen, Urine 01/13/2023 Negative  Negative Final   • Tricyclic Antidepressants Screen 01/13/2023 Negative  Negative Final   • Methadone Screen, Urine 01/13/2023 Negative  Negative Final   • Barbiturates Screen, Urine 01/13/2023 Negative  Negative Final   • Oxycodone Screen, Urine 01/13/2023 Negative  Negative Final   • Propoxyphene Screen 01/13/2023 Negative  Negative Final   • Buprenorphine, Screen, Urine 01/13/2023 Negative  Negative Final   • Magnesium 01/13/2023 2.1  1.6 - 2.6 mg/dL Final   • Ethanol  01/13/2023 190 (H)  0 - 10 mg/dL Final   • Ethanol % 01/13/2023 0.190  % Final   • WBC 01/13/2023 9.18  3.40 - 10.80 10*3/mm3 Final   • RBC 01/13/2023 5.33  4.14 - 5.80 10*6/mm3 Final   • Hemoglobin 01/13/2023 16.5  13.0 - 17.7 g/dL Final   • Hematocrit 01/13/2023 47.6  37.5 - 51.0 % Final   • MCV 01/13/2023 89.3  79.0 - 97.0 fL Final   • MCH 01/13/2023 31.0  26.6 - 33.0 pg Final   • MCHC 01/13/2023 34.7  31.5 - 35.7 g/dL Final   • RDW 01/13/2023 11.9 (L)  12.3 - 15.4 % Final   • RDW-SD 01/13/2023 38.4  37.0 - 54.0 fl Final   • MPV 01/13/2023 9.3  6.0 - 12.0 fL Final   • Platelets 01/13/2023 259  140 - 450 10*3/mm3 Final   • Neutrophil % 01/13/2023 55.7  42.7 - 76.0 % Final   • Lymphocyte % 01/13/2023 36.6  19.6 - 45.3 % Final   • Monocyte % 01/13/2023 5.7  5.0 - 12.0 % Final   • Eosinophil % 01/13/2023 1.3  0.3 - 6.2 % Final   • Basophil % 01/13/2023 0.4  0.0 - 1.5 % Final   • Immature Grans % 01/13/2023 0.3  0.0 - 0.5 % Final   • Neutrophils, Absolute 01/13/2023 5.11  1.70 - 7.00 10*3/mm3 Final   • Lymphocytes, Absolute 01/13/2023 3.36 (H)  0.70 - 3.10 10*3/mm3 Final   • Monocytes, Absolute 01/13/2023 0.52  0.10 - 0.90 10*3/mm3 Final   • Eosinophils, Absolute 01/13/2023 0.12  0.00 - 0.40 10*3/mm3 Final   • Basophils, Absolute 01/13/2023 0.04  0.00 - 0.20 10*3/mm3 Final   • Immature Grans, Absolute 01/13/2023 0.03  0.00 - 0.05 10*3/mm3 Final   • nRBC 01/13/2023 0.0  0.0 - 0.2 /100 WBC Final   • COVID19 01/13/2023 Not Detected  Not Detected - Ref. Range Final   • Influenza A PCR 01/13/2023 Not Detected  Not Detected Final   • Influenza B PCR 01/13/2023 Not Detected  Not Detected Final   • Ethanol 01/14/2023 128 (H)  0 - 10 mg/dL Final   • Ethanol % 01/14/2023 0.128  % Final   • Ethanol 01/14/2023 83 (H)  0 - 10 mg/dL Final   • Ethanol % 01/14/2023 0.083  % Final       Impression/Formulation:    ICD-10-CM ICD-9-CM   1. Alcohol use disorder, severe, dependence (HCC)  F10.20 303.90   2. Bipolar I disorder, most recent  episode mixed, severe with psychotic features ([facility])  F31.64 296.64        CLINICAL MANEUVERING/INTERVENTIONS: Therapist utilized a person-centered approach to build and maintain rapport with group member.   Therapist promoted safe nonjudgmental environment by providing group members with unconditional positive regard.  Assisted member in processing above session content; acknowledged and normalized patient's thoughts, feelings and concerns.  Allowed patient to freely discuss issues without interruption or judgment. Provided safe, confidential environment to facilitate the development of positive therapeutic relationship and encourage open, honest communication. Therapist assisted group member with identifying and implementing healthier coping strategies and maintaining healthier boundaries. Assisted patient in identifying risk factors which would indicate the need for higher level of care including thoughts to harm self or others and/or self-harming behavior and encouraged patient to contact this office, call 911, or present to the nearest emergency room should any of these events occur. Pt agreeable to adhere to medication regimen as prescribed and report any side effects.  Discussed crisis intervention services and means to access.  Patient adamantly and convincingly denies current suicidal or homicidal ideation or perceptual disturbance.      Therapist implemented motivational interviewing techniques to assist client with exploring and resolving ambivalence associated with commitment to change behaviors.  Yes  Therapist utilized dialectical behavior techniques to teach and model emotional regulation and relaxation.  No   Therapist applied cognitive behavioral strategies to facilitate identification of maladaptive patterns of thinking and behavior.  Yes     PLAN:    Continue [facility] CD PHP Phase I  Aftercare:  [facility] CD Outpatient Phase 2     Please note that portions of this note were  completed with a voice recognition program. Efforts were made to edit dictation, but occasionally words are mistranscribed.     This document signed by Mulugeta Santos LCSW, January 25, 2023, 15:39 EST

## 2023-01-26 ENCOUNTER — OFFICE VISIT (OUTPATIENT)
Dept: PSYCHIATRY | Facility: HOSPITAL | Age: 28
End: 2023-01-26

## 2023-01-26 DIAGNOSIS — F10.20 ALCOHOL USE DISORDER, SEVERE, DEPENDENCE: Primary | ICD-10-CM

## 2023-01-26 DIAGNOSIS — F31.64 BIPOLAR I DISORDER, MOST RECENT EPISODE MIXED, SEVERE WITH PSYCHOTIC FEATURES: ICD-10-CM

## 2023-01-26 PROCEDURE — G0410 GRP PSYCH PARTIAL HOSP 45-50: HCPCS | Performed by: SOCIAL WORKER

## 2023-01-26 PROCEDURE — G0177 OPPS/PHP; TRAIN & EDUC SERV: HCPCS | Performed by: SOCIAL WORKER

## 2023-01-26 PROCEDURE — H0035 MH PARTIAL HOSP TX UNDER 24H: HCPCS | Performed by: SOCIAL WORKER

## 2023-01-26 RX ORDER — LITHIUM CARBONATE 300 MG
300 TABLET ORAL 2 TIMES DAILY
Qty: 60 TABLET | Refills: 0 | Status: SHIPPED | OUTPATIENT
Start: 2023-01-26

## 2023-01-26 RX ORDER — OLANZAPINE 10 MG/1
10 TABLET ORAL NIGHTLY
Qty: 30 TABLET | Refills: 0 | Status: SHIPPED | OUTPATIENT
Start: 2023-01-26

## 2023-01-26 NOTE — PROGRESS NOTES
NAME: Eduardo Cannon  DATE: 01/26/2023    [facility] Phase I  [id number]    Services Provided    Group Psychotherapy x 2  Education/Training x 2  Activity Therapy  x 0  Individual Therapy x 0 0 minutes  Family Therapy x 0  MD Initial Visit  x 0  MD Follow-Up   x 0    Number of participants: 2    DATA:  Patient reported he was just getting buy today. He explained his anxiety and depression were related to stress, primarily due to not having a job. Regarding sleep, he reported having achieved only 5 hours of sleep. He was experiencing difficulty finding a comfortable temperature at night, reporting he was always too hot.     Patient was rather receptive to the conversation about mental healthy diagnoses. He agreed that they were not beings in and of themselves, as if the diagnosis was pulling the strings and had a personality by itself. He believed that he could make changes and be better by making appropriate changes in his lifestyle. Still, he also shared about his experience of mood swings, and how they would seemingly arise out of nowhere. He also shared the belief that his life had already run its course. This idea led to his feelings of depression and hopelessness. He shared that he had already picked out a burial plot and had told his family his final wishes. He did not have the desire to die by suicide, but he felt it would be better for him if he were dead. He had made peace with it.     Overall, he seemed to understand that his symptoms were real, and to some degree they were beyond his control, while at the same time he could make lifestyle changes and he healthier. He displayed a good amount of insights and hopefulness, while also displaying a generalized feeling hopelessness. It was a strange dichotomy.     Participant unexpectedly was absent from the noon hour of group. Perhaps he misunderstood the expectations for the meetings times.     Individual: No    Homework: Assigned Relapse Prevention Plan  "    Group 1 (Psychotherapy: Check-ins): Therapist continued facilitation of rapport building strategies between group members. Therapist asked that each patient check in with home life and recovery efforts and identify triggers, cravings, and high risk situations that arise between group sessions.  Group members discussed barriers and benefits of attending recovery meetings.  Therapist provided empathy and support during group session. Daily Reading: None. Viewed the video, How to get better sleep (WITHOUT MEDICATIONS) (2019) by [employer] with Dr. Jacki Escobedo on [employer].     Group 2  (12 Steps) Began Step One in the NA Stepworking Guide. Explored in detail the question, \"What does the 'disease of addiction' mean to me?\"    Group 3 (CBT) Viewed the video, This could be why you're depressed and anxious (2019) by [facility] with Raul Rosado on YouTube. Emphasized realistic thinking about diagnosable mental illnesses.     Group 4 (Vulnerability) Viewed the video, The power of vulnerability (2011) by Corinne Almaguer. Processed the content aloud with group participants.      ASSESSMENT:    Personal Assessment 0-10 Scale (10 worst)    Anxiety:  5 (no comment)  Depression:  7 (at the end of group he reported believing his depression was at a 1 or 0)  Cravings: 7 (no comment)     MSE within normal limits? Yes Affect: somber Mood: depressed  Pt Response:  open/receptive  Engaged in activity/Process and self-disclosed: adequate  Applies today's group topics to self: adequate  Able to give and receive feedback: adequate  Demonstrated insightful thinking: consistent and adequate  Other pt response comments:  Patient was a positive participant. They demonstrated a relatively positive attitude, a moderate degree of motivation, and adequate insight, as evidenced by his having been quite engaged with the group material today combined with his belief that $50,000 in the bank would decrease the likelihood of his depressive symptoms. Still, he " acknowledged that this would not fix everything, but would seem to make things easier. They seemed interested and attentive. They appeared to comprehend well the concepts being discussed. The patient seemed receptive of, and willing to implement, the ideas being discussed. Their thought process appeared linear and goal directed, and their speech was regular rate and rhythm.    Community Group Engagement:  No: no interested in AA/NA at this time.    Reported relapse since last meeting? No: none since Tuesday, January 24th    .  Orders Only on 01/25/2023   Component Date Value Ref Range Status   • External Amphetamine Screen Urine 01/25/2023 Negative   Final   • External Benzodiazepine Screen Uri* 01/25/2023 Negative   Final   • External Cocaine Screen Urine 01/25/2023 Negative   Final   • External THC Screen Urine 01/25/2023 Negative   Final   • External Methadone Screen Urine 01/25/2023 Negative   Final   • External Methamphetamine Screen Ur* 01/25/2023 Negative   Final   • External Oxycodone Screen Urine 01/25/2023 Negative   Final   • External Buprenorphine Screen Urine 01/25/2023 Negative   Final   • External MDMA 01/25/2023 Negative   Final   • External Opiates Screen Urine 01/25/2023 Negative   Final   Admission on 01/14/2023, Discharged on 01/17/2023   Component Date Value Ref Range Status   • QT Interval 01/14/2023 372  ms Final   • QTC Interval 01/14/2023 415  ms Final   Admission on 01/13/2023, Discharged on 01/14/2023   Component Date Value Ref Range Status   • Glucose 01/13/2023 122 (H)  65 - 99 mg/dL Final   • BUN 01/13/2023 10  6 - 20 mg/dL Final   • Creatinine 01/13/2023 0.89  0.76 - 1.27 mg/dL Final   • Sodium 01/13/2023 137  136 - 145 mmol/L Final   • Potassium 01/13/2023 3.8  3.5 - 5.2 mmol/L Final    Slight hemolysis detected by analyzer. Results may be affected.   • Chloride 01/13/2023 101  98 - 107 mmol/L Final   • CO2 01/13/2023 20.0 (L)  22.0 - 29.0 mmol/L Final   • Calcium 01/13/2023 9.6  8.6  - 10.5 mg/dL Final   • Total Protein 01/13/2023 7.5  6.0 - 8.5 g/dL Final   • Albumin 01/13/2023 4.8  3.5 - 5.2 g/dL Final   • ALT (SGPT) 01/13/2023 69 (H)  1 - 41 U/L Final   • AST (SGOT) 01/13/2023 36  1 - 40 U/L Final   • Alkaline Phosphatase 01/13/2023 128 (H)  39 - 117 U/L Final   • Total Bilirubin 01/13/2023 0.3  0.0 - 1.2 mg/dL Final   • Globulin 01/13/2023 2.7  gm/dL Final   • A/G Ratio 01/13/2023 1.8  g/dL Final   • BUN/Creatinine Ratio 01/13/2023 11.2  7.0 - 25.0 Final   • Anion Gap 01/13/2023 16.0 (H)  5.0 - 15.0 mmol/L Final   • eGFR 01/13/2023 122.0  >60.0 mL/min/1.73 Final    National Kidney Foundation and American Society of Nephrology (ASN) Task Force recommended calculation based on the Chronic Kidney Disease Epidemiology Collaboration (CKD-EPI) equation refit without adjustment for race.   • Color, UA 01/13/2023 Yellow  Yellow, Straw Final   • Appearance, UA 01/13/2023 Clear  Clear Final   • pH, UA 01/13/2023 6.0  5.0 - 8.0 Final   • Specific Gravity, UA 01/13/2023 <=1.005  1.005 - 1.030 Final   • Glucose, UA 01/13/2023 Negative  Negative Final   • Ketones, UA 01/13/2023 Negative  Negative Final   • Bilirubin, UA 01/13/2023 Negative  Negative Final   • Blood, UA 01/13/2023 Negative  Negative Final   • Protein, UA 01/13/2023 Negative  Negative Final   • Leuk Esterase, UA 01/13/2023 Negative  Negative Final   • Nitrite, UA 01/13/2023 Negative  Negative Final   • Urobilinogen, UA 01/13/2023 0.2 E.U./dL  0.2 - 1.0 E.U./dL Final   • THC, Screen, Urine 01/13/2023 Negative  Negative Final   • Phencyclidine (PCP), Urine 01/13/2023 Negative  Negative Final   • Cocaine Screen, Urine 01/13/2023 Negative  Negative Final   • Methamphetamine, Ur 01/13/2023 Negative  Negative Final   • Opiate Screen 01/13/2023 Negative  Negative Final   • Amphetamine Screen, Urine 01/13/2023 Negative  Negative Final   • Benzodiazepine Screen, Urine 01/13/2023 Negative  Negative Final   • Tricyclic Antidepressants Screen  01/13/2023 Negative  Negative Final   • Methadone Screen, Urine 01/13/2023 Negative  Negative Final   • Barbiturates Screen, Urine 01/13/2023 Negative  Negative Final   • Oxycodone Screen, Urine 01/13/2023 Negative  Negative Final   • Propoxyphene Screen 01/13/2023 Negative  Negative Final   • Buprenorphine, Screen, Urine 01/13/2023 Negative  Negative Final   • Magnesium 01/13/2023 2.1  1.6 - 2.6 mg/dL Final   • Ethanol 01/13/2023 190 (H)  0 - 10 mg/dL Final   • Ethanol % 01/13/2023 0.190  % Final   • WBC 01/13/2023 9.18  3.40 - 10.80 10*3/mm3 Final   • RBC 01/13/2023 5.33  4.14 - 5.80 10*6/mm3 Final   • Hemoglobin 01/13/2023 16.5  13.0 - 17.7 g/dL Final   • Hematocrit 01/13/2023 47.6  37.5 - 51.0 % Final   • MCV 01/13/2023 89.3  79.0 - 97.0 fL Final   • MCH 01/13/2023 31.0  26.6 - 33.0 pg Final   • MCHC 01/13/2023 34.7  31.5 - 35.7 g/dL Final   • RDW 01/13/2023 11.9 (L)  12.3 - 15.4 % Final   • RDW-SD 01/13/2023 38.4  37.0 - 54.0 fl Final   • MPV 01/13/2023 9.3  6.0 - 12.0 fL Final   • Platelets 01/13/2023 259  140 - 450 10*3/mm3 Final   • Neutrophil % 01/13/2023 55.7  42.7 - 76.0 % Final   • Lymphocyte % 01/13/2023 36.6  19.6 - 45.3 % Final   • Monocyte % 01/13/2023 5.7  5.0 - 12.0 % Final   • Eosinophil % 01/13/2023 1.3  0.3 - 6.2 % Final   • Basophil % 01/13/2023 0.4  0.0 - 1.5 % Final   • Immature Grans % 01/13/2023 0.3  0.0 - 0.5 % Final   • Neutrophils, Absolute 01/13/2023 5.11  1.70 - 7.00 10*3/mm3 Final   • Lymphocytes, Absolute 01/13/2023 3.36 (H)  0.70 - 3.10 10*3/mm3 Final   • Monocytes, Absolute 01/13/2023 0.52  0.10 - 0.90 10*3/mm3 Final   • Eosinophils, Absolute 01/13/2023 0.12  0.00 - 0.40 10*3/mm3 Final   • Basophils, Absolute 01/13/2023 0.04  0.00 - 0.20 10*3/mm3 Final   • Immature Grans, Absolute 01/13/2023 0.03  0.00 - 0.05 10*3/mm3 Final   • nRBC 01/13/2023 0.0  0.0 - 0.2 /100 WBC Final   • COVID19 01/13/2023 Not Detected  Not Detected - Ref. Range Final   • Influenza A PCR 01/13/2023 Not  Detected  Not Detected Final   • Influenza B PCR 01/13/2023 Not Detected  Not Detected Final   • Ethanol 01/14/2023 128 (H)  0 - 10 mg/dL Final   • Ethanol % 01/14/2023 0.128  % Final   • Ethanol 01/14/2023 83 (H)  0 - 10 mg/dL Final   • Ethanol % 01/14/2023 0.083  % Final       Impression/Formulation:    ICD-10-CM ICD-9-CM   1. Alcohol use disorder, severe, dependence ([facility])  F10.20 303.90   2. Bipolar I disorder, most recent episode mixed, severe with psychotic features ([facility])  F31.64 296.64        CLINICAL MANEUVERING/INTERVENTIONS: Therapist utilized a person-centered approach to build and maintain rapport with group member.   Therapist promoted safe nonjudgmental environment by providing group members with unconditional positive regard.  Assisted member in processing above session content; acknowledged and normalized patient's thoughts, feelings and concerns.  Allowed patient to freely discuss issues without interruption or judgment. Provided safe, confidential environment to facilitate the development of positive therapeutic relationship and encourage open, honest communication. Therapist assisted group member with identifying and implementing healthier coping strategies and maintaining healthier boundaries. Assisted patient in identifying risk factors which would indicate the need for higher level of care including thoughts to harm self or others and/or self-harming behavior and encouraged patient to contact this office, call 911, or present to the nearest emergency room should any of these events occur. Pt agreeable to adhere to medication regimen as prescribed and report any side effects.  Discussed crisis intervention services and means to access.  Patient adamantly and convincingly denies current suicidal or homicidal ideation or perceptual disturbance.      Therapist implemented motivational interviewing techniques to assist client with exploring and resolving ambivalence associated with commitment to  change behaviors.  Yes  Therapist utilized dialectical behavior techniques to teach and model emotional regulation and relaxation.  No   Therapist applied cognitive behavioral strategies to facilitate identification of maladaptive patterns of thinking and behavior.  Yes     PLAN:    [facility]  [facility]      Please note that portions of this note were completed with a voice recognition program. Efforts were made to edit dictation, but occasionally words are mistranscribed.     This document signed by Mulugeta Santos LCSW, January 26, 2023, 15:36 EST

## 2023-02-07 ENCOUNTER — DOCUMENTATION (OUTPATIENT)
Dept: PSYCHIATRY | Facility: CLINIC | Age: 28
End: 2023-02-07
Payer: MEDICAID

## 2023-02-07 NOTE — PROGRESS NOTES
[address] [address]  [phone]      HOSPITAL-BASED PROGRAMS   (IOP/PHP/OUTPATIENT MH and CD)  DISCHARGE SUMMARY      Patient Name: Eduardo Cannon  Patient : 03/15/2023  Program: CD PHP  Attending Psychiatric Provider: Dr. Padilla Upton  Therapist: Mulugeta Santos LCSW    Date of Admission: Wednesday, 2023  Last Date Attended:   Date of Discharge:   Total Number of Days: 2  Reason for Discharge: Noncompliance with attendance policy   Aftercare Plan (including next appointment date and time, and facility name, if possible): Patient is responsible for arranging aftercare.     Discharge Medications:    Current Outpatient Medications:   •  lithium 300 MG tablet, Take 1 tablet by mouth 2 (Two) Times a Day., Disp: 60 tablet, Rfl: 0  •  OLANZapine (zyPREXA) 10 MG tablet, Take 1 tablet by mouth Every Night. Indications: MIXED BIPOLAR AFFECTIVE DISORDER, Disp: 30 tablet, Rfl: 0    Patient Diagnosis(es):  1. Alcohol use disorder, severe, dependence (H[facility])  F10.20 303.90   2. Bipolar I disorder, most recent episode mixed, severe with psychotic features (H[facility])  F31.64 296.64         This document signed by Mulugeta Santos LCSW, 2023, 15:42 EST

## 2023-08-15 ENCOUNTER — HOSPITAL ENCOUNTER (INPATIENT)
Facility: HOSPITAL | Age: 28
LOS: 2 days | Discharge: HOME | DRG: 885 | End: 2023-08-17
Attending: PSYCHIATRY & NEUROLOGY | Admitting: PSYCHIATRY & NEUROLOGY
Payer: MEDICARE

## 2023-08-15 ENCOUNTER — HOSPITAL ENCOUNTER (EMERGENCY)
Facility: HOSPITAL | Age: 28
Discharge: OTHER INSTITUTION | DRG: 885 | End: 2023-08-15
Attending: STUDENT IN AN ORGANIZED HEALTH CARE EDUCATION/TRAINING PROGRAM
Payer: MEDICARE

## 2023-08-15 VITALS
TEMPERATURE: 98.2 F | SYSTOLIC BLOOD PRESSURE: 138 MMHG | BODY MASS INDEX: 30.06 KG/M2 | WEIGHT: 210 LBS | OXYGEN SATURATION: 97 % | HEART RATE: 99 BPM | RESPIRATION RATE: 16 BRPM | HEIGHT: 70 IN | DIASTOLIC BLOOD PRESSURE: 91 MMHG

## 2023-08-15 DIAGNOSIS — F10.10 ALCOHOL ABUSE: Primary | ICD-10-CM

## 2023-08-15 DIAGNOSIS — R45.851 SUICIDAL IDEATIONS: ICD-10-CM

## 2023-08-15 LAB
ALBUMIN SERPL-MCNC: 5 G/DL (ref 3.5–5.2)
ALBUMIN/GLOB SERPL: 1.9 G/DL
ALP SERPL-CCNC: 142 U/L (ref 39–117)
ALT SERPL W P-5'-P-CCNC: 147 U/L (ref 1–41)
AMPHET+METHAMPHET UR QL: NEGATIVE
AMPHETAMINES UR QL: NEGATIVE
ANION GAP SERPL CALCULATED.3IONS-SCNC: 17.4 MMOL/L (ref 5–15)
APAP SERPL-MCNC: <5 MCG/ML (ref 0–30)
AST SERPL-CCNC: 69 U/L (ref 1–40)
BARBITURATES UR QL SCN: NEGATIVE
BASOPHILS # BLD AUTO: 0.06 10*3/MM3 (ref 0–0.2)
BASOPHILS NFR BLD AUTO: 0.7 % (ref 0–1.5)
BENZODIAZ UR QL SCN: NEGATIVE
BILIRUB SERPL-MCNC: <0.2 MG/DL (ref 0–1.2)
BILIRUB UR QL STRIP: NEGATIVE
BUN SERPL-MCNC: 8 MG/DL (ref 6–20)
BUN/CREAT SERPL: 8.9 (ref 7–25)
BUPRENORPHINE SERPL-MCNC: NEGATIVE NG/ML
CALCIUM SPEC-SCNC: 9 MG/DL (ref 8.6–10.5)
CANNABINOIDS SERPL QL: NEGATIVE
CHLORIDE SERPL-SCNC: 102 MMOL/L (ref 98–107)
CLARITY UR: CLEAR
CO2 SERPL-SCNC: 17.6 MMOL/L (ref 22–29)
COCAINE UR QL: NEGATIVE
COLOR UR: YELLOW
CREAT SERPL-MCNC: 0.9 MG/DL (ref 0.76–1.27)
DEPRECATED RDW RBC AUTO: 43.3 FL (ref 37–54)
EGFRCR SERPLBLD CKD-EPI 2021: 120.8 ML/MIN/1.73
EOSINOPHIL # BLD AUTO: 0.09 10*3/MM3 (ref 0–0.4)
EOSINOPHIL NFR BLD AUTO: 1.1 % (ref 0.3–6.2)
ERYTHROCYTE [DISTWIDTH] IN BLOOD BY AUTOMATED COUNT: 12.9 % (ref 12.3–15.4)
ETHANOL BLD-MCNC: 109 MG/DL (ref 0–10)
ETHANOL BLD-MCNC: 222 MG/DL (ref 0–10)
ETHANOL BLD-MCNC: 41 MG/DL (ref 0–10)
ETHANOL UR QL: 0.04 %
ETHANOL UR QL: 0.11 %
ETHANOL UR QL: 0.22 %
FENTANYL UR-MCNC: NEGATIVE NG/ML
FLUAV RNA RESP QL NAA+PROBE: NOT DETECTED
FLUBV RNA ISLT QL NAA+PROBE: NOT DETECTED
GLOBULIN UR ELPH-MCNC: 2.6 GM/DL
GLUCOSE SERPL-MCNC: 256 MG/DL (ref 65–99)
GLUCOSE UR STRIP-MCNC: ABNORMAL MG/DL
HCT VFR BLD AUTO: 47.1 % (ref 37.5–51)
HGB BLD-MCNC: 16 G/DL (ref 13–17.7)
HGB UR QL STRIP.AUTO: NEGATIVE
HOLD SPECIMEN: NORMAL
HOLD SPECIMEN: NORMAL
IMM GRANULOCYTES # BLD AUTO: 0.09 10*3/MM3 (ref 0–0.05)
IMM GRANULOCYTES NFR BLD AUTO: 1.1 % (ref 0–0.5)
KETONES UR QL STRIP: NEGATIVE
LEUKOCYTE ESTERASE UR QL STRIP.AUTO: NEGATIVE
LYMPHOCYTES # BLD AUTO: 3.24 10*3/MM3 (ref 0.7–3.1)
LYMPHOCYTES NFR BLD AUTO: 39.7 % (ref 19.6–45.3)
MAGNESIUM SERPL-MCNC: 2.2 MG/DL (ref 1.6–2.6)
MCH RBC QN AUTO: 31.2 PG (ref 26.6–33)
MCHC RBC AUTO-ENTMCNC: 34 G/DL (ref 31.5–35.7)
MCV RBC AUTO: 91.8 FL (ref 79–97)
METHADONE UR QL SCN: NEGATIVE
MONOCYTES # BLD AUTO: 0.57 10*3/MM3 (ref 0.1–0.9)
MONOCYTES NFR BLD AUTO: 7 % (ref 5–12)
NEUTROPHILS NFR BLD AUTO: 4.11 10*3/MM3 (ref 1.7–7)
NEUTROPHILS NFR BLD AUTO: 50.4 % (ref 42.7–76)
NITRITE UR QL STRIP: NEGATIVE
NRBC BLD AUTO-RTO: 0 /100 WBC (ref 0–0.2)
OPIATES UR QL: NEGATIVE
OXYCODONE UR QL SCN: NEGATIVE
PCP UR QL SCN: NEGATIVE
PH UR STRIP.AUTO: 5.5 [PH] (ref 5–8)
PLATELET # BLD AUTO: 240 10*3/MM3 (ref 140–450)
PMV BLD AUTO: 9.3 FL (ref 6–12)
POTASSIUM SERPL-SCNC: 3.7 MMOL/L (ref 3.5–5.2)
PROPOXYPH UR QL: NEGATIVE
PROT SERPL-MCNC: 7.6 G/DL (ref 6–8.5)
PROT UR QL STRIP: NEGATIVE
QT INTERVAL: 380 MS
QTC INTERVAL: 412 MS
RBC # BLD AUTO: 5.13 10*6/MM3 (ref 4.14–5.8)
SALICYLATES SERPL-MCNC: <0.3 MG/DL
SARS-COV-2 RNA RESP QL NAA+PROBE: NOT DETECTED
SODIUM SERPL-SCNC: 137 MMOL/L (ref 136–145)
SP GR UR STRIP: 1.01 (ref 1–1.03)
TRICYCLICS UR QL SCN: NEGATIVE
UROBILINOGEN UR QL STRIP: ABNORMAL
WBC NRBC COR # BLD: 8.16 10*3/MM3 (ref 3.4–10.8)
WHOLE BLOOD HOLD COAG: NORMAL
WHOLE BLOOD HOLD SPECIMEN: NORMAL

## 2023-08-15 PROCEDURE — 87636 SARSCOV2 & INF A&B AMP PRB: CPT | Performed by: PHYSICIAN ASSISTANT

## 2023-08-15 PROCEDURE — 80143 DRUG ASSAY ACETAMINOPHEN: CPT | Performed by: PHYSICIAN ASSISTANT

## 2023-08-15 PROCEDURE — 99285 EMERGENCY DEPT VISIT HI MDM: CPT

## 2023-08-15 PROCEDURE — 80179 DRUG ASSAY SALICYLATE: CPT | Performed by: PHYSICIAN ASSISTANT

## 2023-08-15 PROCEDURE — 25010000002 ONDANSETRON PER 1 MG: Performed by: STUDENT IN AN ORGANIZED HEALTH CARE EDUCATION/TRAINING PROGRAM

## 2023-08-15 PROCEDURE — 82077 ASSAY SPEC XCP UR&BREATH IA: CPT | Performed by: PHYSICIAN ASSISTANT

## 2023-08-15 PROCEDURE — 93010 ELECTROCARDIOGRAM REPORT: CPT | Performed by: INTERNAL MEDICINE

## 2023-08-15 PROCEDURE — 85025 COMPLETE CBC W/AUTO DIFF WBC: CPT | Performed by: PHYSICIAN ASSISTANT

## 2023-08-15 PROCEDURE — 36415 COLL VENOUS BLD VENIPUNCTURE: CPT

## 2023-08-15 PROCEDURE — 80307 DRUG TEST PRSMV CHEM ANLYZR: CPT | Performed by: PHYSICIAN ASSISTANT

## 2023-08-15 PROCEDURE — 25010000002 LORAZEPAM PER 2 MG: Performed by: STUDENT IN AN ORGANIZED HEALTH CARE EDUCATION/TRAINING PROGRAM

## 2023-08-15 PROCEDURE — 81003 URINALYSIS AUTO W/O SCOPE: CPT | Performed by: PHYSICIAN ASSISTANT

## 2023-08-15 PROCEDURE — 25010000002 THIAMINE PER 100 MG: Performed by: STUDENT IN AN ORGANIZED HEALTH CARE EDUCATION/TRAINING PROGRAM

## 2023-08-15 PROCEDURE — 96365 THER/PROPH/DIAG IV INF INIT: CPT

## 2023-08-15 PROCEDURE — 80053 COMPREHEN METABOLIC PANEL: CPT | Performed by: PHYSICIAN ASSISTANT

## 2023-08-15 PROCEDURE — 63710000001 ONDANSETRON PER 8 MG: Performed by: PSYCHIATRY & NEUROLOGY

## 2023-08-15 PROCEDURE — 83735 ASSAY OF MAGNESIUM: CPT | Performed by: PHYSICIAN ASSISTANT

## 2023-08-15 PROCEDURE — 96375 TX/PRO/DX INJ NEW DRUG ADDON: CPT

## 2023-08-15 PROCEDURE — 93005 ELECTROCARDIOGRAM TRACING: CPT | Performed by: PSYCHIATRY & NEUROLOGY

## 2023-08-15 RX ORDER — FAMOTIDINE 20 MG/1
20 TABLET, FILM COATED ORAL 2 TIMES DAILY PRN
Status: DISCONTINUED | OUTPATIENT
Start: 2023-08-15 | End: 2023-08-17 | Stop reason: HOSPADM

## 2023-08-15 RX ORDER — SODIUM CHLORIDE 0.9 % (FLUSH) 0.9 %
10 SYRINGE (ML) INJECTION AS NEEDED
Status: DISCONTINUED | OUTPATIENT
Start: 2023-08-15 | End: 2023-08-15 | Stop reason: HOSPADM

## 2023-08-15 RX ORDER — IBUPROFEN 400 MG/1
400 TABLET ORAL EVERY 6 HOURS PRN
Status: DISCONTINUED | OUTPATIENT
Start: 2023-08-15 | End: 2023-08-17 | Stop reason: HOSPADM

## 2023-08-15 RX ORDER — LORAZEPAM 0.5 MG/1
0.5 TABLET ORAL
Status: DISCONTINUED | OUTPATIENT
Start: 2023-08-19 | End: 2023-08-17 | Stop reason: HOSPADM

## 2023-08-15 RX ORDER — ONDANSETRON 4 MG/1
4 TABLET, FILM COATED ORAL EVERY 6 HOURS PRN
Status: DISCONTINUED | OUTPATIENT
Start: 2023-08-15 | End: 2023-08-17 | Stop reason: HOSPADM

## 2023-08-15 RX ORDER — ECHINACEA PURPUREA EXTRACT 125 MG
2 TABLET ORAL AS NEEDED
Status: DISCONTINUED | OUTPATIENT
Start: 2023-08-15 | End: 2023-08-17 | Stop reason: HOSPADM

## 2023-08-15 RX ORDER — THIAMINE HYDROCHLORIDE 100 MG/ML
200 INJECTION, SOLUTION INTRAMUSCULAR; INTRAVENOUS ONCE
Status: COMPLETED | OUTPATIENT
Start: 2023-08-15 | End: 2023-08-15

## 2023-08-15 RX ORDER — TRAZODONE HYDROCHLORIDE 50 MG/1
50 TABLET ORAL NIGHTLY PRN
Status: DISCONTINUED | OUTPATIENT
Start: 2023-08-15 | End: 2023-08-17 | Stop reason: HOSPADM

## 2023-08-15 RX ORDER — LORAZEPAM 1 MG/1
1 TABLET ORAL
Status: DISCONTINUED | OUTPATIENT
Start: 2023-08-18 | End: 2023-08-17 | Stop reason: HOSPADM

## 2023-08-15 RX ORDER — LORAZEPAM 1 MG/1
1 TABLET ORAL EVERY 4 HOURS PRN
Status: DISCONTINUED | OUTPATIENT
Start: 2023-08-18 | End: 2023-08-17 | Stop reason: HOSPADM

## 2023-08-15 RX ORDER — NICOTINE 21 MG/24HR
1 PATCH, TRANSDERMAL 24 HOURS TRANSDERMAL
Status: DISCONTINUED | OUTPATIENT
Start: 2023-08-15 | End: 2023-08-17 | Stop reason: HOSPADM

## 2023-08-15 RX ORDER — BENZTROPINE MESYLATE 1 MG/1
2 TABLET ORAL ONCE AS NEEDED
Status: DISCONTINUED | OUTPATIENT
Start: 2023-08-15 | End: 2023-08-17 | Stop reason: HOSPADM

## 2023-08-15 RX ORDER — LORAZEPAM 2 MG/1
2 TABLET ORAL EVERY 4 HOURS PRN
Status: ACTIVE | OUTPATIENT
Start: 2023-08-16 | End: 2023-08-17

## 2023-08-15 RX ORDER — ONDANSETRON 2 MG/ML
4 INJECTION INTRAMUSCULAR; INTRAVENOUS ONCE
Status: COMPLETED | OUTPATIENT
Start: 2023-08-15 | End: 2023-08-15

## 2023-08-15 RX ORDER — LOPERAMIDE HYDROCHLORIDE 2 MG/1
2 CAPSULE ORAL
Status: DISCONTINUED | OUTPATIENT
Start: 2023-08-15 | End: 2023-08-17 | Stop reason: HOSPADM

## 2023-08-15 RX ORDER — BENZONATATE 100 MG/1
100 CAPSULE ORAL 3 TIMES DAILY PRN
Status: DISCONTINUED | OUTPATIENT
Start: 2023-08-15 | End: 2023-08-17 | Stop reason: HOSPADM

## 2023-08-15 RX ORDER — LORAZEPAM 1 MG/1
1 TABLET ORAL ONCE
Status: COMPLETED | OUTPATIENT
Start: 2023-08-15 | End: 2023-08-15

## 2023-08-15 RX ORDER — HYDROXYZINE 50 MG/1
50 TABLET, FILM COATED ORAL EVERY 6 HOURS PRN
Status: DISCONTINUED | OUTPATIENT
Start: 2023-08-15 | End: 2023-08-17 | Stop reason: HOSPADM

## 2023-08-15 RX ORDER — LORAZEPAM 0.5 MG/1
0.5 TABLET ORAL EVERY 4 HOURS PRN
Status: DISCONTINUED | OUTPATIENT
Start: 2023-08-19 | End: 2023-08-17 | Stop reason: HOSPADM

## 2023-08-15 RX ORDER — LORAZEPAM 2 MG/1
2 TABLET ORAL
Status: COMPLETED | OUTPATIENT
Start: 2023-08-16 | End: 2023-08-16

## 2023-08-15 RX ORDER — BENZTROPINE MESYLATE 1 MG/ML
1 INJECTION INTRAMUSCULAR; INTRAVENOUS ONCE AS NEEDED
Status: DISCONTINUED | OUTPATIENT
Start: 2023-08-15 | End: 2023-08-17 | Stop reason: HOSPADM

## 2023-08-15 RX ORDER — LORAZEPAM 2 MG/ML
1 INJECTION INTRAMUSCULAR ONCE
Status: COMPLETED | OUTPATIENT
Start: 2023-08-15 | End: 2023-08-15

## 2023-08-15 RX ORDER — MULTIVITAMIN WITH IRON
2 TABLET ORAL DAILY
Status: DISCONTINUED | OUTPATIENT
Start: 2023-08-15 | End: 2023-08-17 | Stop reason: HOSPADM

## 2023-08-15 RX ORDER — MULTIPLE VITAMINS W/ MINERALS TAB 9MG-400MCG
1 TAB ORAL DAILY
Status: DISCONTINUED | OUTPATIENT
Start: 2023-08-15 | End: 2023-08-17 | Stop reason: HOSPADM

## 2023-08-15 RX ORDER — ALUMINA, MAGNESIA, AND SIMETHICONE 2400; 2400; 240 MG/30ML; MG/30ML; MG/30ML
15 SUSPENSION ORAL EVERY 6 HOURS PRN
Status: DISCONTINUED | OUTPATIENT
Start: 2023-08-15 | End: 2023-08-17 | Stop reason: HOSPADM

## 2023-08-15 RX ORDER — SODIUM CHLORIDE 9 MG/ML
1000 INJECTION, SOLUTION INTRAVENOUS ONCE
Status: COMPLETED | OUTPATIENT
Start: 2023-08-15 | End: 2023-08-15

## 2023-08-15 RX ORDER — LORAZEPAM 2 MG/1
2 TABLET ORAL
Status: DISPENSED | OUTPATIENT
Start: 2023-08-15 | End: 2023-08-16

## 2023-08-15 RX ADMIN — LORAZEPAM 2 MG: 2 TABLET ORAL at 20:54

## 2023-08-15 RX ADMIN — Medication 2 TABLET: at 18:19

## 2023-08-15 RX ADMIN — TRAZODONE HYDROCHLORIDE 50 MG: 50 TABLET ORAL at 20:54

## 2023-08-15 RX ADMIN — NICOTINE TRANSDERMAL SYSTEM 1 PATCH: 21 PATCH, EXTENDED RELEASE TRANSDERMAL at 16:49

## 2023-08-15 RX ADMIN — THIAMINE HYDROCHLORIDE 200 MG: 100 INJECTION, SOLUTION INTRAMUSCULAR; INTRAVENOUS at 08:24

## 2023-08-15 RX ADMIN — LORAZEPAM 1 MG: 1 TABLET ORAL at 13:42

## 2023-08-15 RX ADMIN — ONDANSETRON HYDROCHLORIDE 4 MG: 4 TABLET, FILM COATED ORAL at 20:54

## 2023-08-15 RX ADMIN — IBUPROFEN 400 MG: 400 TABLET, FILM COATED ORAL at 20:54

## 2023-08-15 RX ADMIN — ONDANSETRON HYDROCHLORIDE 4 MG: 2 INJECTION, SOLUTION INTRAMUSCULAR; INTRAVENOUS at 07:35

## 2023-08-15 RX ADMIN — LORAZEPAM 1 MG: 2 INJECTION INTRAMUSCULAR; INTRAVENOUS at 07:35

## 2023-08-15 RX ADMIN — Medication 1 TABLET: at 16:49

## 2023-08-15 RX ADMIN — SODIUM CHLORIDE 1000 ML: 9 INJECTION, SOLUTION INTRAVENOUS at 07:34

## 2023-08-15 RX ADMIN — Medication 100 MG: at 16:49

## 2023-08-15 RX ADMIN — HYDROXYZINE HYDROCHLORIDE 50 MG: 50 TABLET ORAL at 20:54

## 2023-08-15 RX ADMIN — FOLIC ACID 1 MG: 5 INJECTION, SOLUTION INTRAMUSCULAR; INTRAVENOUS; SUBCUTANEOUS at 08:24

## 2023-08-15 NOTE — NURSING NOTE
Presented pt to Dr. Felipe new order to admit patient to AE. Routine orders. SP3. Ativan detox protocol with comfort meds. [id number].

## 2023-08-15 NOTE — ED NOTES
Pt states that he is having a hard time in his head right now. Pt states he is an alcoholic and drinks greater than 1 fifth of whiskey every day and has for over the last 5 years. Pt states he has thought about drinking himself to death. Pt reports a previous suicidal attempt by overdosing on sleep aid. Suicidal precautions are in place and a sitter is at bedside. Pt is tachycardic but appears to be in no acute distress. WCTM

## 2023-08-15 NOTE — PLAN OF CARE
Goal Outcome Evaluation:  Plan of Care Reviewed With: patient  Patient Agreement with Plan of Care: agrees

## 2023-08-15 NOTE — NURSING NOTE
Spoke with Dr. Felipe if pt does not agree to sign in voluntarily then to admit patient on 72 hour hold.

## 2023-08-15 NOTE — ED PROVIDER NOTES
Subjective     History provided by:  Patient  Mental Health Problem  Presenting symptoms: depression and suicidal thoughts    Degree of incapacity (severity):  Severe  Onset quality:  Gradual  Duration:  3 months  Timing:  Constant  Progression:  Worsening  Chronicity:  Chronic  Context: alcohol use    Treatment compliance:  Untreated  Associated symptoms: anxiety, feelings of worthlessness and poor judgment    Associated symptoms: no abdominal pain and no chest pain    Risk factors: hx of mental illness      Review of Systems   Constitutional: Negative.  Negative for fever.   HENT: Negative.     Respiratory: Negative.     Cardiovascular: Negative.  Negative for chest pain.   Gastrointestinal: Negative.  Negative for abdominal pain.   Endocrine: Negative.    Genitourinary: Negative.  Negative for dysuria.   Skin: Negative.    Neurological: Negative.    Psychiatric/Behavioral:  Positive for suicidal ideas. The patient is nervous/anxious.    All other systems reviewed and are negative.    Past Medical History:   Diagnosis Date    ADHD (attention deficit hyperactivity disorder)     Alcoholism     Bipolar disorder     Psychiatric illness     PTSD (post-traumatic stress disorder)     Schizoaffective disorder     Suicide attempt     Withdrawal symptoms, drug or narcotic        No Known Allergies    Past Surgical History:   Procedure Laterality Date    TONSILLECTOMY AND ADENOIDECTOMY         Family History   Problem Relation Age of Onset    Depression Father        Social History     Socioeconomic History    Marital status: Single    Number of children: 1    Years of education: ged   Tobacco Use    Smoking status: Every Day     Packs/day: 1.00     Years: 10.00     Pack years: 10.00     Types: Cigarettes    Smokeless tobacco: Never   Vaping Use    Vaping Use: Some days    Substances: Nicotine, Flavoring    Devices: Disposable   Substance and Sexual Activity    Alcohol use: Yes     Comment: see below    Drug use: Yes      Types: Marijuana     Comment: alcohol    Sexual activity: Not Currently     Partners: Female     Birth control/protection: None           Objective   Physical Exam  Vitals and nursing note reviewed.   Constitutional:       General: He is not in acute distress.     Appearance: He is well-developed. He is not diaphoretic.   HENT:      Head: Normocephalic and atraumatic.      Right Ear: External ear normal.      Left Ear: External ear normal.      Nose: Nose normal.   Eyes:      Conjunctiva/sclera: Conjunctivae normal.   Neck:      Vascular: No JVD.      Trachea: No tracheal deviation.   Cardiovascular:      Rate and Rhythm: Normal rate.      Heart sounds: No murmur heard.  Pulmonary:      Effort: Pulmonary effort is normal. No respiratory distress.      Breath sounds: No wheezing.   Abdominal:      Palpations: Abdomen is soft.      Tenderness: There is no abdominal tenderness.   Musculoskeletal:         General: No deformity. Normal range of motion.      Cervical back: Normal range of motion and neck supple.   Skin:     General: Skin is warm and dry.      Coloration: Skin is not pale.      Findings: No erythema or rash.   Neurological:      Mental Status: He is alert and oriented to person, place, and time.      Cranial Nerves: No cranial nerve deficit.   Psychiatric:      Comments: Patient verbalized daily use of alcohol.  Patient states that he drank right before coming to the ED.  Patient was brought to the ED by police who in which that he called.  Patient states he does have suicidal thoughts that increase with intoxication.  States using delta 8 Gummies from gas stations.  Denies any other drug use.       Procedures       Results for orders placed or performed during the hospital encounter of 08/15/23   COVID-19 and FLU A/B PCR - Swab, Nasopharynx    Specimen: Nasopharynx; Swab   Result Value Ref Range    COVID19 Not Detected Not Detected - Ref. Range    Influenza A PCR Not Detected Not Detected    Influenza B  PCR Not Detected Not Detected   Comprehensive Metabolic Panel    Specimen: Arm, Left; Blood   Result Value Ref Range    Glucose 256 (H) 65 - 99 mg/dL    BUN 8 6 - 20 mg/dL    Creatinine 0.90 0.76 - 1.27 mg/dL    Sodium 137 136 - 145 mmol/L    Potassium 3.7 3.5 - 5.2 mmol/L    Chloride 102 98 - 107 mmol/L    CO2 17.6 (L) 22.0 - 29.0 mmol/L    Calcium 9.0 8.6 - 10.5 mg/dL    Total Protein 7.6 6.0 - 8.5 g/dL    Albumin 5.0 3.5 - 5.2 g/dL    ALT (SGPT) 147 (H) 1 - 41 U/L    AST (SGOT) 69 (H) 1 - 40 U/L    Alkaline Phosphatase 142 (H) 39 - 117 U/L    Total Bilirubin <0.2 0.0 - 1.2 mg/dL    Globulin 2.6 gm/dL    A/G Ratio 1.9 g/dL    BUN/Creatinine Ratio 8.9 7.0 - 25.0    Anion Gap 17.4 (H) 5.0 - 15.0 mmol/L    eGFR 120.8 >60.0 mL/min/1.73   Acetaminophen Level    Specimen: Arm, Left; Blood   Result Value Ref Range    Acetaminophen <5.0 0.0 - 30.0 mcg/mL   Ethanol    Specimen: Arm, Left; Blood   Result Value Ref Range    Ethanol 222 (H) 0 - 10 mg/dL    Ethanol % 0.222 %   Salicylate Level    Specimen: Arm, Left; Blood   Result Value Ref Range    Salicylate <0.3 <=30.0 mg/dL   Urine Drug Screen - Urine, Clean Catch    Specimen: Urine, Clean Catch   Result Value Ref Range    THC, Screen, Urine Negative Negative    Phencyclidine (PCP), Urine Negative Negative    Cocaine Screen, Urine Negative Negative    Methamphetamine, Ur Negative Negative    Opiate Screen Negative Negative    Amphetamine Screen, Urine Negative Negative    Benzodiazepine Screen, Urine Negative Negative    Tricyclic Antidepressants Screen Negative Negative    Methadone Screen, Urine Negative Negative    Barbiturates Screen, Urine Negative Negative    Oxycodone Screen, Urine Negative Negative    Propoxyphene Screen Negative Negative    Buprenorphine, Screen, Urine Negative Negative   Magnesium    Specimen: Arm, Left; Blood   Result Value Ref Range    Magnesium 2.2 1.6 - 2.6 mg/dL   CBC Auto Differential    Specimen: Arm, Left; Blood   Result Value Ref Range     WBC 8.16 3.40 - 10.80 10*3/mm3    RBC 5.13 4.14 - 5.80 10*6/mm3    Hemoglobin 16.0 13.0 - 17.7 g/dL    Hematocrit 47.1 37.5 - 51.0 %    MCV 91.8 79.0 - 97.0 fL    MCH 31.2 26.6 - 33.0 pg    MCHC 34.0 31.5 - 35.7 g/dL    RDW 12.9 12.3 - 15.4 %    RDW-SD 43.3 37.0 - 54.0 fl    MPV 9.3 6.0 - 12.0 fL    Platelets 240 140 - 450 10*3/mm3    Neutrophil % 50.4 42.7 - 76.0 %    Lymphocyte % 39.7 19.6 - 45.3 %    Monocyte % 7.0 5.0 - 12.0 %    Eosinophil % 1.1 0.3 - 6.2 %    Basophil % 0.7 0.0 - 1.5 %    Immature Grans % 1.1 (H) 0.0 - 0.5 %    Neutrophils, Absolute 4.11 1.70 - 7.00 10*3/mm3    Lymphocytes, Absolute 3.24 (H) 0.70 - 3.10 10*3/mm3    Monocytes, Absolute 0.57 0.10 - 0.90 10*3/mm3    Eosinophils, Absolute 0.09 0.00 - 0.40 10*3/mm3    Basophils, Absolute 0.06 0.00 - 0.20 10*3/mm3    Immature Grans, Absolute 0.09 (H) 0.00 - 0.05 10*3/mm3    nRBC 0.0 0.0 - 0.2 /100 WBC   Ethanol    Specimen: Blood   Result Value Ref Range    Ethanol 109 (H) 0 - 10 mg/dL    Ethanol % 0.109 %   Urinalysis With Microscopic If Indicated (No Culture) - Urine, Clean Catch    Specimen: Urine, Clean Catch   Result Value Ref Range    Color, UA Yellow Yellow, Straw    Appearance, UA Clear Clear    pH, UA 5.5 5.0 - 8.0    Specific Gravity, UA 1.008 1.005 - 1.030    Glucose,  mg/dL (2+) (A) Negative    Ketones, UA Negative Negative    Bilirubin, UA Negative Negative    Blood, UA Negative Negative    Protein, UA Negative Negative    Leuk Esterase, UA Negative Negative    Nitrite, UA Negative Negative    Urobilinogen, UA 0.2 E.U./dL 0.2 - 1.0 E.U./dL   Fentanyl, Urine - Urine, Clean Catch    Specimen: Urine, Clean Catch   Result Value Ref Range    Fentanyl, Urine Negative Negative   Ethanol    Specimen: Blood   Result Value Ref Range    Ethanol 41 (H) 0 - 10 mg/dL    Ethanol % 0.041 %   Green Top (Gel)   Result Value Ref Range    Extra Tube Hold for add-ons.    Lavender Top   Result Value Ref Range    Extra Tube hold for add-on    Gold  Top - SST   Result Value Ref Range    Extra Tube Hold for add-ons.    Light Blue Top   Result Value Ref Range    Extra Tube Hold for add-ons.           ED Course  ED Course as of 08/15/23 1644   Tue Aug 15, 2023   0759 Ethanol(!): 222 [AH]   1642 Medically clear for psych [AH]      ED Course User Index  [AH] Jina Bell PA                                           Medical Decision Making  28-year-old male presents to the ED today for a mental health evaluation.  He initially reported depression with suicidal ideations.  He was noted to be acutely intoxicated with an alcohol level of 222.  Once his alcohol level trended down below 100 he was evaluated by our intake department.  Psychiatry was consulted who recommended inpatient admission.    Amount and/or Complexity of Data Reviewed  Labs: ordered. Decision-making details documented in ED Course.    Risk  Prescription drug management.        Final diagnoses:   Alcohol abuse   Suicidal ideations       ED Disposition  ED Disposition       ED Disposition   DC/Transfer to [facility]al [facility]   --    Comment   --               No follow-up provider specified.       Medication List      No changes were made to your prescriptions during this visit.            Jina Bell PA  08/15/23 [id number]

## 2023-08-15 NOTE — NURSING NOTE
Patient presents reporting suicidal ideation with no specific plan. He denies hi and avh. He also reports drinking 1/5 of whiskey a day for the past 6 years last drink was 8/15/23. Patient reports poor sleep and poor appetite. Patient is very evasive and restless during assessment. Patient rates anxiety 9/10 and denies depression. [place] 11.

## 2023-08-15 NOTE — PLAN OF CARE
Goal Outcome Evaluation:  Plan of Care Reviewed With: patient  Patient Agreement with Plan of Care: agrees    New Admission. Care Plan reviewed and initiated.

## 2023-08-16 PROCEDURE — 99223 1ST HOSP IP/OBS HIGH 75: CPT | Performed by: PSYCHIATRY & NEUROLOGY

## 2023-08-16 RX ORDER — OLANZAPINE 5 MG/1
5 TABLET ORAL NIGHTLY
Status: DISCONTINUED | OUTPATIENT
Start: 2023-08-16 | End: 2023-08-17 | Stop reason: HOSPADM

## 2023-08-16 RX ADMIN — TRAZODONE HYDROCHLORIDE 50 MG: 50 TABLET ORAL at 20:34

## 2023-08-16 RX ADMIN — HYDROXYZINE HYDROCHLORIDE 50 MG: 50 TABLET ORAL at 14:18

## 2023-08-16 RX ADMIN — LORAZEPAM 2 MG: 2 TABLET ORAL at 21:31

## 2023-08-16 RX ADMIN — Medication 100 MG: at 08:52

## 2023-08-16 RX ADMIN — LORAZEPAM 2 MG: 2 TABLET ORAL at 14:17

## 2023-08-16 RX ADMIN — NICOTINE TRANSDERMAL SYSTEM 1 PATCH: 21 PATCH, EXTENDED RELEASE TRANSDERMAL at 08:52

## 2023-08-16 RX ADMIN — OLANZAPINE 5 MG: 5 TABLET, FILM COATED ORAL at 20:34

## 2023-08-16 RX ADMIN — Medication 1 TABLET: at 08:52

## 2023-08-16 RX ADMIN — IBUPROFEN 400 MG: 400 TABLET, FILM COATED ORAL at 20:40

## 2023-08-16 RX ADMIN — Medication 2 TABLET: at 08:52

## 2023-08-16 RX ADMIN — HYDROXYZINE HYDROCHLORIDE 50 MG: 50 TABLET ORAL at 20:34

## 2023-08-16 RX ADMIN — LORAZEPAM 2 MG: 2 TABLET ORAL at 08:52

## 2023-08-16 NOTE — PLAN OF CARE
Goal Outcome Evaluation:  Plan of Care Reviewed With: patient  Patient Agreement with Plan of Care: agrees     Progress: improving  Outcome Evaluation: RATED ANXIETY AND DEPRESSION BOTH 4 ON A SCALE OF 0-10 DURING AM NURSING ASSESSMENT. CLIENT SLEEPS OR STAYS TO SELF OFTEN DURING THIS SHIFT.

## 2023-08-16 NOTE — PLAN OF CARE
Problem: Adult Behavioral Health Plan of Care  Goal: Plan of Care Review  Outcome: Ongoing, Progressing  Flowsheets  Taken 8/16/2023 1151 by Nelia Hendrix  Consent Given to Review Plan with: Refuses  Progress: improving  Outcome Evaluation:   Therapist met with patient to review plan of care   patient agreeable.  Taken 8/16/2023 0229 by Christopher Reed RN  Plan of Care Reviewed With: patient  Patient Agreement with Plan of Care: agrees  Goal: Patient-Specific Goal (Individualization)  Outcome: Ongoing, Progressing  Flowsheets  Taken 8/16/2023 1151  Patient-Specific Goals (Include Timeframe): Identify 2-3 healthy coping skills, deny SI/HI, complete safety planning, and complete aftercare planning prior to discharge.  Individualized Care Needs: Therapist to offer 1-4 individual sessions, daily groups, safety planning, aftercare planning, and brief CBT interventions during hospitalization.  Taken 8/16/2023 1144  Patient Personal Strengths:   resourceful   resilient   expressive of needs   expressive of emotions   self-reliant  Patient Vulnerabilities:   adverse childhood experience(s)   poor impulse control   lacks insight into illness   substance abuse/addiction  Goal: Optimized Coping Skills in Response to Life Stressors  Outcome: Ongoing, Progressing  Flowsheets (Taken 8/16/2023 1151)  Optimized Coping Skills in Response to Life Stressors: making progress toward outcome  Intervention: Promote Effective Coping Strategies  Flowsheets (Taken 8/16/2023 1151)  Supportive Measures:   active listening utilized   counseling provided   decision-making supported   goal-setting facilitated   problem-solving facilitated   self-responsibility promoted   self-reflection promoted   positive reinforcement provided   self-care encouraged   verbalization of feelings encouraged   relaxation techniques promoted  Goal: Develops/Participates in Therapeutic [place] to Support Successful Transition  Outcome: Ongoing,  Progressing  Flowsheets (Taken 8/16/2023 1151)  Develops/Participates in Therapeutic [place] to Support Successful Transition: making progress toward outcome  Intervention: Foster Therapeutic [place]  Flowsheets (Taken 8/16/2023 1151)  Trust Relationship/Rapport:   care explained   questions answered   questions encouraged   choices provided   emotional support provided   reassurance provided   thoughts/feelings acknowledged   empathic listening provided  Intervention: Mutually Develop Transition Plan  Flowsheets  Taken 8/16/2023 1151 by Nelia Hendrix  Outpatient/Agency/Support Group Needs:   outpatient medication management   outpatient counseling   outpatient psychiatric care (specify)  Discharge Coordination/Progress: Patient has insurance and denies transportation concerns. Patient agreeable with discharge plan.  Transition Support:   community resources reviewed   follow-up care coordinated   follow-up care discussed   crisis management plan promoted   crisis management plan verbalized  Anticipated Discharge Disposition: home with family  Current Discharge Risk:   substance use/abuse   psychiatric illness  Concerns to be Addressed:   mental health   suicidal   substance/tobacco abuse/use   coping/stress  Readmission Within the Last 30 Days: no previous admission in last 30 days  Patient's Choice of Community Agency(s): [facility]  Offered/Gave Vendor List: no  Taken 8/15/2023 1625 by Sara Alicia RN  Transportation Anticipated: family or friend will provide  Transportation Concerns: none  Patient/Family Anticipated Services at Transition: outpatient care  Patient/Family Anticipates Transition to: home with family   Goal Outcome Evaluation:   Consent Given to Review Plan with: Refuses  Progress: improving  Outcome Evaluation: Therapist met with patient to review plan of care; patient agreeable.       DATA:      Therapist discussed case with RN and met with patient today to review coping skills, review  plan of care, and discuss discharge.    Therapist educated patient on levels of care, including recommending residential treatment for substance use. Patient declines. Therapist obtained consent for [facility].     Therapist educated patient on the importance of family involvement in care, patient declined. Therapist educated patient on the importance and benefits of safety planning; patient refused. No consent provided.      Clinical Maneuvering/Intervention:     Therapist assisted patient in processing above session content; acknowledged and normalized patient's thoughts, feelings, and concerns.  Discussed the therapist/patient relationship and explain the parameters and limitations of relative confidentiality.  Also discussed the importance of active participation, and honesty to the treatment process.  Encouraged the patient to discuss/vent their feelings, frustrations, and fears concerning their ongoing medical issues and validated their feelings.     Allowed patient to freely discuss issues without interruption or judgment. Provided safe, confidential environment to facilitate the development of positive therapeutic relationship and encourage open, honest communication.      Therapist addressed discharge safety planning this date. Assisted patient in identifying risk factors which would indicate the need for higher level of care after discharge;  including thoughts to harm self or others and/or self-harming behavior. Encouraged patient to call 911, or present to the nearest emergency room should any of these events occur. Discussed crisis intervention services and means to access.  Encouraged securing any objects of harm.    Therapist completed integrated summary, treatment plan, and initiated social history this date.  Therapist is strongly encouraging family involvement in treatment.       Encouraged mask wearing, social distancing, and regular hand washing due to COVID19 risk.      ASSESSMENT:     "  Patient is a 28 year old male who presented to the ED for suicidal ideations with a plan to drink himself to death. Patient reports he drinks more than a fifth of whiskey daily for the past 5 years. Patient reports one prior suicide attempt by overdosing on sleeping aids. Patient approached nurses station in the ED and stated \"I'm fine now. I don't know why I'm even here I would like to leave.\" Patient has 1 prior hospitalization at this facility. He reports he is currently working and living with his grandmother.     Therapist met 1:1 with patient today. Patient denies suicidal ideation. Patient denies homicidal ideation. Patient denies AVH. Patient is minimally cooperative with assessment. Patient presents as evasive, irritable, and dismissive. Patient reports he came to the hospital because he felt depressed and anxious. He currently denies any interpersonal conflict at home and plans to return to his grandma's home at discharge. Patient is evasive about stressors, but identified substance use as his primary stressor in the ED.      PLAN:       Patient to remain hospitalized this date.      Treatment team will focus efforts on stabilizing patient's acute symptoms while providing education on healthy coping and crisis management to reduce hospitalizations.   Patient requires daily psychiatrist evaluation and 24/7 nursing supervision to promote patient  safety.     Therapist will offer 1-4 individual sessions, 1 therapy group daily, family education, and appropriate referral.    "
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
## 2023-08-16 NOTE — PLAN OF CARE
Goal Outcome Evaluation:  Plan of Care Reviewed With: patient  Patient Agreement with Plan of Care: agrees     Progress: improving  Outcome Evaluation: Patient rates anxiety 4, depression 4, eating good. Patient came to day room for snack

## 2023-08-17 VITALS
BODY MASS INDEX: 30.98 KG/M2 | OXYGEN SATURATION: 99 % | HEART RATE: 66 BPM | HEIGHT: 70 IN | TEMPERATURE: 97 F | SYSTOLIC BLOOD PRESSURE: 131 MMHG | RESPIRATION RATE: 18 BRPM | DIASTOLIC BLOOD PRESSURE: 75 MMHG | WEIGHT: 216.4 LBS

## 2023-08-17 PROCEDURE — 99238 HOSP IP/OBS DSCHRG MGMT 30/<: CPT | Performed by: PSYCHIATRY & NEUROLOGY

## 2023-08-17 RX ORDER — TRAZODONE HYDROCHLORIDE 50 MG/1
50 TABLET ORAL NIGHTLY PRN
Qty: 30 TABLET | Refills: 0 | Status: SHIPPED | OUTPATIENT
Start: 2023-08-17 | End: 2023-08-28 | Stop reason: SDUPTHER

## 2023-08-17 RX ORDER — OLANZAPINE 5 MG/1
5 TABLET ORAL NIGHTLY
Qty: 30 TABLET | Refills: 0 | Status: SHIPPED | OUTPATIENT
Start: 2023-08-17 | End: 2023-08-28

## 2023-08-17 RX ADMIN — IBUPROFEN 400 MG: 400 TABLET, FILM COATED ORAL at 08:04

## 2023-08-17 RX ADMIN — NICOTINE TRANSDERMAL SYSTEM 1 PATCH: 21 PATCH, EXTENDED RELEASE TRANSDERMAL at 08:04

## 2023-08-17 RX ADMIN — HYDROXYZINE HYDROCHLORIDE 50 MG: 50 TABLET ORAL at 08:04

## 2023-08-17 RX ADMIN — Medication 2 TABLET: at 08:03

## 2023-08-17 RX ADMIN — Medication 100 MG: at 08:03

## 2023-08-17 RX ADMIN — LORAZEPAM 1.5 MG: 1 TABLET ORAL at 08:03

## 2023-08-17 RX ADMIN — Medication 1 TABLET: at 08:03

## 2023-08-17 NOTE — PLAN OF CARE
Goal Outcome Evaluation:   Consent Given to Review Plan with: Refuses  Progress: improving  Outcome Evaluation: Therapist met with patient to review plan of care; patient agreeable.      Discharge Note:    On date of discharge, patient is calm and cooperative. Patient denies SI/HI/AVH. Patient reports improvement in mood and symptoms during hospitalization. Patient is future oriented, looking forward to returning to work. 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 Patient continues to decline family involvement in care. He denies withdrawal symptoms. Patient is able to identify protective factors and healthy coping skills. Patient is agreeable to return to the nearest ED/contact 911 if they experience SI/HI/AVH. He is requesting discharge today. Patient denies needs or concerns prior to discharge today.

## 2023-08-17 NOTE — DISCHARGE SUMMARY
":  1995  MRN:  [id number]  Visit Number:  [id number]      Date of Admission:8/15/2023   Date of Discharge:  2023    Discharge Diagnosis:  Active Problems:    Bipolar I disorder, most recent episode mixed, severe with psychotic features    Alcohol use disorder, severe, dependence    Suicidal ideation        Admission Diagnosis:  Suicidal ideation [R45.851]     ANYA Cannon is a 28 y.o. male who was admitted on 8/15/2023 with complaints of suicidal ideation.    For details please see H&P dated 23.    Hospital Course  Patient is a 28 y.o. male presented with suicidal ideations because he was feeling overwhelmed. He also reported he had been without his medications for bipolar order for some time.  He also reported that he had been drinking.  He was admitted to the adult psych unit for safety, further evaluation and treatment.  He was resumed on olanzapine which he was previously taking but had run out of.  He was also started on Ativan detox for any emergent withdrawals.  He reported no active withdrawals when he came in and continued to do well.  The next day he reported feeling better.  He reported that he was able to think more clearly.  He denied any thoughts of harm to self or others.  He stated that he needed to get back to his work.  He denied any active withdrawals or cravings for alcohol.  He was encouraged to abstain from alcohol and if he started having any withdrawals to come back to the hospital.  As patient was objectively doing better and he wanted to go back to work it was felt that he could be discharged and was encouraged to continue his outpatient care.    Mental Status Exam upon discharge:   Mood \"good\"   Affect-congruent, appropriate, stable  Thought Content-goal directed, no delusional material present  Thought process-linear, organized.  Suicidality: No SI  Homicidality: No HI  Perception: No AH/VH    Procedures Performed         Consults:   Consults       No orders " found from 7/17/2023 to 8/16/2023.            Pertinent Test Results:   Admission on 08/15/2023   Component Date Value Ref Range Status    QT Interval 08/15/2023 380  ms Final    QTC Interval 08/15/2023 412  ms Final   Admission on 08/15/2023, Discharged on 08/15/2023   Component Date Value Ref Range Status    Glucose 08/15/2023 256 (H)  65 - 99 mg/dL Final    BUN 08/15/2023 8  6 - 20 mg/dL Final    Creatinine 08/15/2023 0.90  0.76 - 1.27 mg/dL Final    Sodium 08/15/2023 137  136 - 145 mmol/L Final    Potassium 08/15/2023 3.7  3.5 - 5.2 mmol/L Final    Slight hemolysis detected by analyzer. Results may be affected.    Chloride 08/15/2023 102  98 - 107 mmol/L Final    CO2 08/15/2023 17.6 (L)  22.0 - 29.0 mmol/L Final    Calcium 08/15/2023 9.0  8.6 - 10.5 mg/dL Final    Total Protein 08/15/2023 7.6  6.0 - 8.5 g/dL Final    Albumin 08/15/2023 5.0  3.5 - 5.2 g/dL Final    ALT (SGPT) 08/15/2023 147 (H)  1 - 41 U/L Final    AST (SGOT) 08/15/2023 69 (H)  1 - 40 U/L Final    Alkaline Phosphatase 08/15/2023 142 (H)  39 - 117 U/L Final    Total Bilirubin 08/15/2023 <0.2  0.0 - 1.2 mg/dL Final    Globulin 08/15/2023 2.6  gm/dL Final    A/G Ratio 08/15/2023 1.9  g/dL Final    BUN/Creatinine Ratio 08/15/2023 8.9  7.0 - 25.0 Final    Anion Gap 08/15/2023 17.4 (H)  5.0 - 15.0 mmol/L Final    eGFR 08/15/2023 120.8  >60.0 mL/min/1.73 Final    Acetaminophen 08/15/2023 <5.0  0.0 - 30.0 mcg/mL Final    Ethanol 08/15/2023 222 (H)  0 - 10 mg/dL Final    Ethanol % 08/15/2023 0.222  % Final    Salicylate 08/15/2023 <0.3  <=30.0 mg/dL Final    THC, Screen, Urine 08/15/2023 Negative  Negative Final    Phencyclidine (PCP), Urine 08/15/2023 Negative  Negative Final    Cocaine Screen, Urine 08/15/2023 Negative  Negative Final    Methamphetamine, Ur 08/15/2023 Negative  Negative Final    Opiate Screen 08/15/2023 Negative  Negative Final    Amphetamine Screen, Urine 08/15/2023 Negative  Negative Final    Benzodiazepine Screen, Urine 08/15/2023  Negative  Negative Final    Tricyclic Antidepressants Screen 08/15/2023 Negative  Negative Final    Methadone Screen, Urine 08/15/2023 Negative  Negative Final    Barbiturates Screen, Urine 08/15/2023 Negative  Negative Final    Oxycodone Screen, Urine 08/15/2023 Negative  Negative Final    Propoxyphene Screen 08/15/2023 Negative  Negative Final    Buprenorphine, Screen, Urine 08/15/2023 Negative  Negative Final    Magnesium 08/15/2023 2.2  1.6 - 2.6 mg/dL Final    COVID19 08/15/2023 Not Detected  Not Detected - Ref. Range Final    Influenza A PCR 08/15/2023 Not Detected  Not Detected Final    Influenza B PCR 08/15/2023 Not Detected  Not Detected Final    Extra Tube 08/15/2023 Hold for add-ons.   Final    Auto resulted.    Extra Tube 08/15/2023 hold for add-on   Final    Auto resulted    Extra Tube 08/15/2023 Hold for add-ons.   Final    Auto resulted.    Extra Tube 08/15/2023 Hold for add-ons.   Final    Auto resulted    WBC 08/15/2023 8.16  3.40 - 10.80 10*3/mm3 Final    RBC 08/15/2023 5.13  4.14 - 5.80 10*6/mm3 Final    Hemoglobin 08/15/2023 16.0  13.0 - 17.7 g/dL Final    Hematocrit 08/15/2023 47.1  37.5 - 51.0 % Final    MCV 08/15/2023 91.8  79.0 - 97.0 fL Final    MCH 08/15/2023 31.2  26.6 - 33.0 pg Final    MCHC 08/15/2023 34.0  31.5 - 35.7 g/dL Final    RDW 08/15/2023 12.9  12.3 - 15.4 % Final    RDW-SD 08/15/2023 43.3  37.0 - 54.0 fl Final    MPV 08/15/2023 9.3  6.0 - 12.0 fL Final    Platelets 08/15/2023 240  140 - 450 10*3/mm3 Final    Neutrophil % 08/15/2023 50.4  42.7 - 76.0 % Final    Lymphocyte % 08/15/2023 39.7  19.6 - 45.3 % Final    Monocyte % 08/15/2023 7.0  5.0 - 12.0 % Final    Eosinophil % 08/15/2023 1.1  0.3 - 6.2 % Final    Basophil % 08/15/2023 0.7  0.0 - 1.5 % Final    Immature Grans % 08/15/2023 1.1 (H)  0.0 - 0.5 % Final    Neutrophils, Absolute 08/15/2023 4.11  1.70 - 7.00 10*3/mm3 Final    Lymphocytes, Absolute 08/15/2023 3.24 (H)  0.70 - 3.10 10*3/mm3 Final    Monocytes, Absolute  08/15/2023 0.57  0.10 - 0.90 10*3/mm3 Final    Eosinophils, Absolute 08/15/2023 0.09  0.00 - 0.40 10*3/mm3 Final    Basophils, Absolute 08/15/2023 0.06  0.00 - 0.20 10*3/mm3 Final    Immature Grans, Absolute 08/15/2023 0.09 (H)  0.00 - 0.05 10*3/mm3 Final    nRBC 08/15/2023 0.0  0.0 - 0.2 /100 WBC Final    Ethanol 08/15/2023 109 (H)  0 - 10 mg/dL Final    Ethanol % 08/15/2023 0.109  % Final    Color, UA 08/15/2023 Yellow  Yellow, Straw Final    Appearance, UA 08/15/2023 Clear  Clear Final    pH, UA 08/15/2023 5.5  5.0 - 8.0 Final    Specific Gravity, UA 08/15/2023 1.008  1.005 - 1.030 Final    Glucose, UA 08/15/2023 500 mg/dL (2+) (A)  Negative Final    Ketones, UA 08/15/2023 Negative  Negative Final    Bilirubin, UA 08/15/2023 Negative  Negative Final    Blood, UA 08/15/2023 Negative  Negative Final    Protein, UA 08/15/2023 Negative  Negative Final    Leuk Esterase, UA 08/15/2023 Negative  Negative Final    Nitrite, UA 08/15/2023 Negative  Negative Final    Urobilinogen, UA 08/15/2023 0.2 E.U./dL  0.2 - 1.0 E.U./dL Final    Fentanyl, Urine 08/15/2023 Negative  Negative Final    Ethanol 08/15/2023 41 (H)  0 - 10 mg/dL Final    Ethanol % 08/15/2023 0.041  % Final        Condition on Discharge:  improved    Vital Signs  Temp:  [96.7 øF (35.9 øC)-98.2 øF (36.8 øC)] 97 øF (36.1 øC)  Heart Rate:  [66-99] 66  Resp:  [18] 18  BP: (131-144)/(75-90) 131/75      Discharge Disposition:  Home or Self Care    Discharge Medications:     Discharge Medications        New Medications        Instructions Start Date   OLANZapine 5 MG tablet  Commonly known as: zyPREXA   5 mg, Oral, Nightly      traZODone 50 MG tablet  Commonly known as: DESYREL   50 mg, Oral, Nightly PRN               Discharge Diet: Regular     Activity at Discharge: As tolerated     Follow-up Appointments  Future Appointments   Date Time Provider Department Center   8/17/2023  2:30 PM Nelia Lopez APRN [facility]         Test Results Pending at  Discharge      Time spent in discharge: < 30 MIN    Clinician:   Kaelyn Felipe MD  08/17/23  10:23 EDT

## 2023-08-17 NOTE — PLAN OF CARE
Goal Outcome Evaluation:  Plan of Care Reviewed With: patient  Patient Agreement with Plan of Care: agrees     Progress: improving  Outcome Evaluation: Patient is anxious about new job. Patient rates anxiety 7/10 and 5/10 depression. Patient states he has pain in his back, sore throat, and feet tingling. CIWA 4. Patient denies SI, HI, and AVH

## 2023-08-25 NOTE — PROGRESS NOTES
"Subjective   Eduardo Cannon is a 28 y.o. male who presents today for follow up    Chief Complaint:  Bipolar disorder    History of Present Illness:   History of Present Illness  Today is a follow-up visit.  Had recent psychiatric hospitalization August 15, 2023 to August 17, 2023   Medication compliance: patient not compliant with medication, has SE. He cut Trazodone in half due to \"headaches\".   Depression rated 8/10, with 10 being the worst.  Anxiety rated 8/10, with 10 being the worst.  Sleep is poor, getting about 4 hours a night,  Nightmares: Denies  Appetite is good.  Hallucinations: Patient has auditory hallucinations, he hears random things, hears people around him saying random things, for example, he thought his grandmother came in and said something to him, but that didn't happen.   Self-harm: Patient denies thoughts of self-harm.  Alcohol use: yes  Drug use: no  Marijuana use: no     Chronic health issues, no acute physical or medical issues today.    Details: he states he went to intermediate, he drank 2 5th's, he got into argument with police. He was charged AI and menacing, he has had previous charges of AI, disorderly conduct, resisting arrest, and criminal trespass. He states he goes to court tomorrow, probably will get on house arrest and IOP. He states he really doesn't want to stop drinking, he states it is the only thing that helps him feel better. He states he \"doesn't have a off switch\". He works at [employer], he is \"security\". He drank a fifth of whiskey this morning, he said that is usually what he does. He lives with his grandmother.  He states if he doesn't drink, he gets nauseas, and shakes. He states he doesn't feel Zyprexa is working and reports he has taken it in the past and it \"didn't work\".     The following portions of the patient's history were reviewed and updated as appropriate: allergies, current medications, past family history, past medical history, past social history, past " surgical history and problem list.      Past Medical History:  Past Medical History:   Diagnosis Date    ADHD (attention deficit hyperactivity disorder)     Alcoholism     Bipolar disorder     Psychiatric illness     PTSD (post-traumatic stress disorder)     Schizoaffective disorder     Suicide attempt     Withdrawal symptoms, drug or narcotic        Social History:  Social History     Socioeconomic History    Marital status: Single    Number of children: 1    Years of education: ged   Tobacco Use    Smoking status: Every Day     Packs/day: 1.00     Years: 10.00     Pack years: 10.00     Types: Cigarettes    Smokeless tobacco: Never   Vaping Use    Vaping Use: Some days    Substances: Nicotine, Flavoring    Devices: Disposable   Substance and Sexual Activity    Alcohol use: Yes     Comment: see below    Drug use: Yes     Types: Marijuana     Comment: alcohol    Sexual activity: Not Currently     Partners: Female     Birth control/protection: None       Family History:  Family History   Problem Relation Age of Onset    Depression Father        Past Surgical History:  Past Surgical History:   Procedure Laterality Date    TONSILLECTOMY AND ADENOIDECTOMY         Problem List:  Patient Active Problem List   Diagnosis    Bipolar I disorder, most recent episode mixed, severe with psychotic features    Alcohol use disorder, severe, dependence    Suicidal ideation       Allergy:   No Known Allergies     Current Medications:   Current Outpatient Medications   Medication Sig Dispense Refill    traZODone (DESYREL) 50 MG tablet Take 1 tablet by mouth At Night As Needed for Sleep. Indications: Trouble Sleeping 30 tablet 0    Cariprazine HCl (Vraylar) 1.5 MG capsule capsule Take 1 capsule by mouth Daily. 30 capsule 0     No current facility-administered medications for this visit.       Review of Symptoms:    Review of Systems   Neurological:  Negative for seizures.   Psychiatric/Behavioral:  Positive for agitation, dysphoric  "mood, hallucinations, sleep disturbance, depressed mood and stress. Negative for self-injury and suicidal ideas. The patient is nervous/anxious.    All other systems reviewed and are negative.    Objective   Physical Exam:   Blood pressure 140/98, pulse 100, height 177.8 cm (70\"), weight 95.7 kg (211 lb).  Body mass index is 30.28 kg/mý.    8/28/23    MENTAL STATUS EXAM   General Appearance:  Cleanly groomed and dressed  Eye Contact:  Good eye contact  Attitude:  Cooperative  Motor Activity:  Fidgety and restless  Speech:  Rapid  Language:  Spontaneous  Mood and affect:  Anxious, irritable, frustrated and mood congruent  Hopelessness:  Denies  Thought Process:  Linear  Associations/ Thought Content:  No delusions  Hallucinations:  Auditory  Suicidal Ideations:  Not present  Homicidal Ideation:  Not present  Sensorium:  Alert  Orientation:  Person, place, time and situation  Insight:  Poor  Judgement:  Poor  Reliability:  Fair  Impulse Control:  Impaired    PHQ-Score Total:  PHQ-9 Total Score: 8    Lab Results:   Admission on 08/15/2023, Discharged on 08/17/2023   Component Date Value Ref Range Status    QT Interval 08/15/2023 380  ms Final    QTC Interval 08/15/2023 412  ms Final   Admission on 08/15/2023, Discharged on 08/15/2023   Component Date Value Ref Range Status    Glucose 08/15/2023 256 (H)  65 - 99 mg/dL Final    BUN 08/15/2023 8  6 - 20 mg/dL Final    Creatinine 08/15/2023 0.90  0.76 - 1.27 mg/dL Final    Sodium 08/15/2023 137  136 - 145 mmol/L Final    Potassium 08/15/2023 3.7  3.5 - 5.2 mmol/L Final    Slight hemolysis detected by analyzer. Results may be affected.    Chloride 08/15/2023 102  98 - 107 mmol/L Final    CO2 08/15/2023 17.6 (L)  22.0 - 29.0 mmol/L Final    Calcium 08/15/2023 9.0  8.6 - 10.5 mg/dL Final    Total Protein 08/15/2023 7.6  6.0 - 8.5 g/dL Final    Albumin 08/15/2023 5.0  3.5 - 5.2 g/dL Final    ALT (SGPT) 08/15/2023 147 (H)  1 - 41 U/L Final    AST (SGOT) 08/15/2023 69 (H)  1 - " 40 U/L Final    Alkaline Phosphatase 08/15/2023 142 (H)  39 - 117 U/L Final    Total Bilirubin 08/15/2023 <0.2  0.0 - 1.2 mg/dL Final    Globulin 08/15/2023 2.6  gm/dL Final    A/G Ratio 08/15/2023 1.9  g/dL Final    BUN/Creatinine Ratio 08/15/2023 8.9  7.0 - 25.0 Final    Anion Gap 08/15/2023 17.4 (H)  5.0 - 15.0 mmol/L Final    eGFR 08/15/2023 120.8  >60.0 mL/min/1.73 Final    Acetaminophen 08/15/2023 <5.0  0.0 - 30.0 mcg/mL Final    Ethanol 08/15/2023 222 (H)  0 - 10 mg/dL Final    Ethanol % 08/15/2023 0.222  % Final    Salicylate 08/15/2023 <0.3  <=30.0 mg/dL Final    THC, Screen, Urine 08/15/2023 Negative  Negative Final    Phencyclidine (PCP), Urine 08/15/2023 Negative  Negative Final    Cocaine Screen, Urine 08/15/2023 Negative  Negative Final    Methamphetamine, Ur 08/15/2023 Negative  Negative Final    Opiate Screen 08/15/2023 Negative  Negative Final    Amphetamine Screen, Urine 08/15/2023 Negative  Negative Final    Benzodiazepine Screen, Urine 08/15/2023 Negative  Negative Final    Tricyclic Antidepressants Screen 08/15/2023 Negative  Negative Final    Methadone Screen, Urine 08/15/2023 Negative  Negative Final    Barbiturates Screen, Urine 08/15/2023 Negative  Negative Final    Oxycodone Screen, Urine 08/15/2023 Negative  Negative Final    Propoxyphene Screen 08/15/2023 Negative  Negative Final    Buprenorphine, Screen, Urine 08/15/2023 Negative  Negative Final    Magnesium 08/15/2023 2.2  1.6 - 2.6 mg/dL Final    COVID19 08/15/2023 Not Detected  Not Detected - Ref. Range Final    Influenza A PCR 08/15/2023 Not Detected  Not Detected Final    Influenza B PCR 08/15/2023 Not Detected  Not Detected Final    Extra Tube 08/15/2023 Hold for add-ons.   Final    Auto resulted.    Extra Tube 08/15/2023 hold for add-on   Final    Auto resulted    Extra Tube 08/15/2023 Hold for add-ons.   Final    Auto resulted.    Extra Tube 08/15/2023 Hold for add-ons.   Final    Auto resulted    WBC 08/15/2023 8.16  3.40 -  10.80 10*3/mm3 Final    RBC 08/15/2023 5.13  4.14 - 5.80 10*6/mm3 Final    Hemoglobin 08/15/2023 16.0  13.0 - 17.7 g/dL Final    Hematocrit 08/15/2023 47.1  37.5 - 51.0 % Final    MCV 08/15/2023 91.8  79.0 - 97.0 fL Final    MCH 08/15/2023 31.2  26.6 - 33.0 pg Final    MCHC 08/15/2023 34.0  31.5 - 35.7 g/dL Final    RDW 08/15/2023 12.9  12.3 - 15.4 % Final    RDW-SD 08/15/2023 43.3  37.0 - 54.0 fl Final    MPV 08/15/2023 9.3  6.0 - 12.0 fL Final    Platelets 08/15/2023 240  140 - 450 10*3/mm3 Final    Neutrophil % 08/15/2023 50.4  42.7 - 76.0 % Final    Lymphocyte % 08/15/2023 39.7  19.6 - 45.3 % Final    Monocyte % 08/15/2023 7.0  5.0 - 12.0 % Final    Eosinophil % 08/15/2023 1.1  0.3 - 6.2 % Final    Basophil % 08/15/2023 0.7  0.0 - 1.5 % Final    Immature Grans % 08/15/2023 1.1 (H)  0.0 - 0.5 % Final    Neutrophils, Absolute 08/15/2023 4.11  1.70 - 7.00 10*3/mm3 Final    Lymphocytes, Absolute 08/15/2023 3.24 (H)  0.70 - 3.10 10*3/mm3 Final    Monocytes, Absolute 08/15/2023 0.57  0.10 - 0.90 10*3/mm3 Final    Eosinophils, Absolute 08/15/2023 0.09  0.00 - 0.40 10*3/mm3 Final    Basophils, Absolute 08/15/2023 0.06  0.00 - 0.20 10*3/mm3 Final    Immature Grans, Absolute 08/15/2023 0.09 (H)  0.00 - 0.05 10*3/mm3 Final    nRBC 08/15/2023 0.0  0.0 - 0.2 /100 WBC Final    Ethanol 08/15/2023 109 (H)  0 - 10 mg/dL Final    Ethanol % 08/15/2023 0.109  % Final    Color, UA 08/15/2023 Yellow  Yellow, Straw Final    Appearance, UA 08/15/2023 Clear  Clear Final    pH, UA 08/15/2023 5.5  5.0 - 8.0 Final    Specific Gravity, UA 08/15/2023 1.008  1.005 - 1.030 Final    Glucose, UA 08/15/2023 500 mg/dL (2+) (A)  Negative Final    Ketones, UA 08/15/2023 Negative  Negative Final    Bilirubin, UA 08/15/2023 Negative  Negative Final    Blood, UA 08/15/2023 Negative  Negative Final    Protein, UA 08/15/2023 Negative  Negative Final    Leuk Esterase, UA 08/15/2023 Negative  Negative Final    Nitrite, UA 08/15/2023 Negative  Negative  Final    Urobilinogen, UA 08/15/2023 0.2 E.U./dL  0.2 - 1.0 E.U./dL Final    Fentanyl, Urine 08/15/2023 Negative  Negative Final    Ethanol 08/15/2023 41 (H)  0 - 10 mg/dL Final    Ethanol % 08/15/2023 0.041  % Final       Assessment & Plan   Problems Addressed this Visit          Mental Health    Bipolar I disorder, most recent episode mixed, severe with psychotic features - Primary    Relevant Medications    traZODone (DESYREL) 50 MG tablet    Cariprazine HCl (Vraylar) 1.5 MG capsule capsule    Other Relevant Orders    Comprehensive Metabolic Panel    Lipid Panel    Hemoglobin A1c    Alcohol use disorder, severe, dependence    Relevant Orders    Comprehensive Metabolic Panel    Lipid Panel    Hemoglobin A1c     Other Visit Diagnoses       Medication management        Relevant Orders    Comprehensive Metabolic Panel    Lipid Panel    Hemoglobin A1c    Elevated glucose level        Relevant Orders    Comprehensive Metabolic Panel    Hemoglobin A1c    Elevated LFTs              Diagnoses         Codes Comments    Bipolar I disorder, most recent episode mixed, severe with psychotic features    -  Primary ICD-10-CM: F31.64  ICD-9-CM: 296.64     Alcohol use disorder, severe, dependence     ICD-10-CM: F10.20  ICD-9-CM: 303.90     Medication management     ICD-10-CM: Z79.899  ICD-9-CM: V58.69     Elevated glucose level     ICD-10-CM: R73.09  ICD-9-CM: 790.29     Elevated LFTs     ICD-10-CM: R79.89  ICD-9-CM: 790.6             Social History     Tobacco Use   Smoking Status Every Day    Packs/day: 1.00    Years: 10.00    Pack years: 10.00    Types: Cigarettes   Smokeless Tobacco Never       CAL reviewed and appropriate. Patient counseled on use of controlled substances.     -The benefits of a healthy diet and exercise were discussed with patient, especially the positive effects they have on mental health. Patient encouraged to consider lifestyle modification regarding  diet and exercise patterns to maximize results of  mental health treatment.  -Reviewed previous available documentation  -Reviewed most recent available labs     -Prior medications include, Zoloft, trazodone, Lamictal, Wellbutrin, zyprexa, Seroquel, Lithium, Adderall.    -Discussed in patient detox, he declines, states he would lose his job. Cautioned about seizures or even death, when alcohol is stopped suddenly, recommend in patient detox, but he declines.    -He states he is trying to lower the strength and amount of alcohol he is drinking, slowly, again cautioned regarding seizures and possible death from abrupt alcohol cessation.    -he plans on discussing with his  tomorrow, possibility of admission to hospital for alcohol detox. He states if the prosecutor will agree to it.       Visit Diagnoses:    ICD-10-CM ICD-9-CM   1. Bipolar I disorder, most recent episode mixed, severe with psychotic features  F31.64 296.64   2. Alcohol use disorder, severe, dependence  F10.20 303.90   3. Medication management  Z79.899 V58.69   4. Elevated glucose level  R73.09 790.29   5. Elevated LFTs  R79.89 790.6         TREATMENT PLAN/GOALS: Continue supportive psychotherapy efforts and medications as indicated. Treatment and medication options discussed during today's visit. Patient acknowledged and verbally consented to continue with current treatment plan and was educated on the importance of compliance with treatment and follow-up appointments.    MEDICATION ISSUES:  Discussed medication options and treatment plan of prescribed medication as well as the risks, benefits, and side effects including potential falls, possible impaired driving and metabolic adversities among others. Patient is agreeable to call the office with any worsening of symptoms or onset of side effects. Patient is agreeable to call 911 or go to the nearest ER should he/she begin having SI/HI.     MEDS ORDERED DURING VISIT:  New Medications Ordered This Visit   Medications    traZODone (DESYREL) 50 MG  tablet     Sig: Take 1 tablet by mouth At Night As Needed for Sleep. Indications: Trouble Sleeping     Dispense:  30 tablet     Refill:  0    Cariprazine HCl (Vraylar) 1.5 MG capsule capsule     Sig: Take 1 capsule by mouth Daily.     Dispense:  30 capsule     Refill:  0     DO NOT FILL DEPAKOTE     -CMP, LIPID PANEL, A1C  -D/C Olanzapine   -Start Vraylar 1.5 MG capsule, take 1 capsule by mouth daily for mood  -Continue Trazodone 50 mg take 1 tablet by mouth at night for sleep    Return in about 1 month (around 9/28/2023).       Prognosis: Guarded dependent on medication/follow up and treatment plan compliance.  Functionality: pt showing improvements in important areas of daily functioning.     Short-term goals: Patient will adhere to medication regimen and note continued improvement in symptoms over the next 3 months.   Long-term goals: Patient will be adherent to medication management and psychotherapy with continued improvement in symptoms over the next 6 months.    I spent 30 minutes caring for Eduardo on this date of service. This time includes time spent by me in the following activities: preparing for the visit, obtaining and/or reviewing a separately obtained history, performing a medically appropriate examination and/or evaluation, counseling and educating the patient/family/caregiver, ordering medications, tests, or procedures, and documenting information in the medical record            This document has been electronically signed by AARON Calle   August 28, 2023 17:18 EDT    Part of this note may be an electronic transcription/translation of spoken language to printed text using the Dragon Dictation System.

## 2023-08-28 ENCOUNTER — OFFICE VISIT (OUTPATIENT)
Dept: PSYCHIATRY | Facility: CLINIC | Age: 28
End: 2023-08-28
Payer: MEDICARE

## 2023-08-28 VITALS
SYSTOLIC BLOOD PRESSURE: 140 MMHG | DIASTOLIC BLOOD PRESSURE: 98 MMHG | HEIGHT: 70 IN | BODY MASS INDEX: 30.21 KG/M2 | HEART RATE: 100 BPM | WEIGHT: 211 LBS

## 2023-08-28 DIAGNOSIS — R73.09 ELEVATED GLUCOSE LEVEL: ICD-10-CM

## 2023-08-28 DIAGNOSIS — R79.89 ELEVATED LFTS: ICD-10-CM

## 2023-08-28 DIAGNOSIS — Z79.899 MEDICATION MANAGEMENT: ICD-10-CM

## 2023-08-28 DIAGNOSIS — F31.64 BIPOLAR I DISORDER, MOST RECENT EPISODE MIXED, SEVERE WITH PSYCHOTIC FEATURES: Primary | ICD-10-CM

## 2023-08-28 DIAGNOSIS — F10.20 ALCOHOL USE DISORDER, SEVERE, DEPENDENCE: ICD-10-CM

## 2023-08-28 PROCEDURE — 99214 OFFICE O/P EST MOD 30 MIN: CPT | Performed by: NURSE PRACTITIONER

## 2023-08-28 PROCEDURE — 1159F MED LIST DOCD IN RCRD: CPT | Performed by: NURSE PRACTITIONER

## 2023-08-28 PROCEDURE — 1160F RVW MEDS BY RX/DR IN RCRD: CPT | Performed by: NURSE PRACTITIONER

## 2023-08-28 RX ORDER — TRAZODONE HYDROCHLORIDE 50 MG/1
50 TABLET ORAL NIGHTLY PRN
Qty: 30 TABLET | Refills: 0 | Status: SHIPPED | OUTPATIENT
Start: 2023-08-28

## 2023-08-28 RX ORDER — DIVALPROEX SODIUM 250 MG/1
250 TABLET, DELAYED RELEASE ORAL 2 TIMES DAILY
Qty: 60 TABLET | Refills: 0 | Status: SHIPPED | OUTPATIENT
Start: 2023-08-28 | End: 2023-08-28

## 2023-08-30 ENCOUNTER — OFFICE VISIT (OUTPATIENT)
Dept: PSYCHIATRY | Facility: CLINIC | Age: 28
End: 2023-08-30
Payer: MEDICARE

## 2023-08-30 DIAGNOSIS — Z79.899 MEDICATION MANAGEMENT: ICD-10-CM

## 2023-08-30 DIAGNOSIS — F10.20 ALCOHOL USE DISORDER, SEVERE, DEPENDENCE: Primary | ICD-10-CM

## 2023-08-30 DIAGNOSIS — F31.64 BIPOLAR I DISORDER, MOST RECENT EPISODE MIXED, SEVERE WITH PSYCHOTIC FEATURES: ICD-10-CM

## 2023-08-30 PROCEDURE — 90791 PSYCH DIAGNOSTIC EVALUATION: CPT | Performed by: SOCIAL WORKER
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
## 2023-08-31 ENCOUNTER — OFFICE VISIT (OUTPATIENT)
Dept: PSYCHIATRY | Facility: HOSPITAL | Age: 28
End: 2023-08-31
Payer: MEDICARE

## 2023-08-31 DIAGNOSIS — F10.20 ALCOHOL USE DISORDER, SEVERE, DEPENDENCE: Primary | ICD-10-CM

## 2023-08-31 DIAGNOSIS — F31.64 BIPOLAR I DISORDER, MOST RECENT EPISODE MIXED, SEVERE WITH PSYCHOTIC FEATURES: ICD-10-CM

## 2023-08-31 PROCEDURE — H0035 MH PARTIAL HOSP TX UNDER 24H: HCPCS | Performed by: SOCIAL WORKER

## 2023-08-31 RX ORDER — CHLORDIAZEPOXIDE HYDROCHLORIDE 25 MG/1
25 CAPSULE, GELATIN COATED ORAL 3 TIMES DAILY PRN
Qty: 30 CAPSULE | Refills: 0 | Status: SHIPPED | OUTPATIENT
Start: 2023-08-31
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
## 2023-08-31 NOTE — PROGRESS NOTES
NAME: Eduardo Cannon  DATE: 08/31/2023    CD PHP Phase  NA  [id number]    Services Provided    Group Psychotherapy x 2  Education/Training x 2  Activity Therapy  x 0  Individual Therapy x 0 0 minutes  Family Therapy x 0  MD Initial Visit  x 1  MD Follow-Up   x 0    Number of participants: 4    DATA:  Patient reported he was tired today. He was going on three hours of sleep. He hoped to be able to endure five weeks of PHP while on third shift, so that he could put the legal problems behind him. Once he was free of the ankle bracelet, he hoped to earn a new position with his employer. He also shared with the group that he as here for alcohol use disorder, and that he was court ordered.     Individual: No    Homework: None assigned    Group 1 (Psychotherapy: Check-ins): Therapist continued facilitation of rapport building strategies between group members. Therapist asked that each patient check in with home life and recovery efforts and identify triggers, cravings, and high risk situations that arise between group sessions.  Group members discussed barriers and benefits of attending recovery meetings.  Therapist provided empathy and support during group session. Daily Reading: None    Group 2  (Sleep Hygiene) Viewed the video How to Get Better Sleep (2020) by [employer] with Dr. Jacki Escobedo on YouTube.     Group 3 (Psychoeducation) This hour of group was facilitated by DEBRA Quintana.    Group 4 (Psychotherapy) This hour of group was facilitated by Dimple William LCSW.     ASSESSMENT:    Personal Assessment 0-10 Scale (10 worst)    Anxiety:  6 (no comment)  Depression:  0 (no comment)  Cravings: 5 (no comment)     MSE within normal limits? No Affect: somber Mood: depressed  Pt Response:  guarded  Engaged in activity/Process and self-disclosed: suboptimal  Applies today's group topics to self: suboptimal  Able to give and receive feedback: suboptimal  Demonstrated insightful thinking: occasional and  suboptimal  Other pt response comments:  Patient was a positive participant. They demonstrated a relatively pessimistic attitude, ambivalence, and moderate insight, as evidenced by his low level of engagement combined with his less-than-optimistic attitude today. They seemed interested and distracted. They appeared to have a fair degree of comprehension regarding the concepts being discussed . The patient seemed doubtful of, and somewhat willing to implement, the ideas being discussed. Their thought process appeared linear and goal directed, and their speech was minimal.       Community Group Engagement:  No: no engagement    Reported relapse since last meeting? Yes: continued alcohol use    .  Office Visit on 08/30/2023   Component Date Value Ref Range Status    External Amphetamine Screen Urine 08/30/2023 Negative   Final    External Benzodiazepine Screen Uri* 08/30/2023 Negative   Final    External Cocaine Screen Urine 08/30/2023 Negative   Final    External THC Screen Urine 08/30/2023 Negative   Final    External Methadone Screen Urine 08/30/2023 Negative   Final    External Methamphetamine Screen Ur* 08/30/2023 Negative   Final    External Oxycodone Screen Urine 08/30/2023 Negative   Final    External Buprenorphine Screen Urine 08/30/2023 Negative   Final    External MDMA 08/30/2023 Negative   Final    External Opiates Screen Urine 08/30/2023 Negative   Final   Admission on 08/15/2023, Discharged on 08/17/2023   Component Date Value Ref Range Status    QT Interval 08/15/2023 380  ms Final    QTC Interval 08/15/2023 412  ms Final   Admission on 08/15/2023, Discharged on 08/15/2023   Component Date Value Ref Range Status    Glucose 08/15/2023 256 (H)  65 - 99 mg/dL Final    BUN 08/15/2023 8  6 - 20 mg/dL Final    Creatinine 08/15/2023 0.90  0.76 - 1.27 mg/dL Final    Sodium 08/15/2023 137  136 - 145 mmol/L Final    Potassium 08/15/2023 3.7  3.5 - 5.2 mmol/L Final    Slight hemolysis detected by analyzer. Results  may be affected.    Chloride 08/15/2023 102  98 - 107 mmol/L Final    CO2 08/15/2023 17.6 (L)  22.0 - 29.0 mmol/L Final    Calcium 08/15/2023 9.0  8.6 - 10.5 mg/dL Final    Total Protein 08/15/2023 7.6  6.0 - 8.5 g/dL Final    Albumin 08/15/2023 5.0  3.5 - 5.2 g/dL Final    ALT (SGPT) 08/15/2023 147 (H)  1 - 41 U/L Final    AST (SGOT) 08/15/2023 69 (H)  1 - 40 U/L Final    Alkaline Phosphatase 08/15/2023 142 (H)  39 - 117 U/L Final    Total Bilirubin 08/15/2023 <0.2  0.0 - 1.2 mg/dL Final    Globulin 08/15/2023 2.6  gm/dL Final    A/G Ratio 08/15/2023 1.9  g/dL Final    BUN/Creatinine Ratio 08/15/2023 8.9  7.0 - 25.0 Final    Anion Gap 08/15/2023 17.4 (H)  5.0 - 15.0 mmol/L Final    eGFR 08/15/2023 120.8  >60.0 mL/min/1.73 Final    Acetaminophen 08/15/2023 <5.0  0.0 - 30.0 mcg/mL Final    Ethanol 08/15/2023 222 (H)  0 - 10 mg/dL Final    Ethanol % 08/15/2023 0.222  % Final    Salicylate 08/15/2023 <0.3  <=30.0 mg/dL Final    THC, Screen, Urine 08/15/2023 Negative  Negative Final    Phencyclidine (PCP), Urine 08/15/2023 Negative  Negative Final    Cocaine Screen, Urine 08/15/2023 Negative  Negative Final    Methamphetamine, Ur 08/15/2023 Negative  Negative Final    Opiate Screen 08/15/2023 Negative  Negative Final    Amphetamine Screen, Urine 08/15/2023 Negative  Negative Final    Benzodiazepine Screen, Urine 08/15/2023 Negative  Negative Final    Tricyclic Antidepressants Screen 08/15/2023 Negative  Negative Final    Methadone Screen, Urine 08/15/2023 Negative  Negative Final    Barbiturates Screen, Urine 08/15/2023 Negative  Negative Final    Oxycodone Screen, Urine 08/15/2023 Negative  Negative Final    Propoxyphene Screen 08/15/2023 Negative  Negative Final    Buprenorphine, Screen, Urine 08/15/2023 Negative  Negative Final    Magnesium 08/15/2023 2.2  1.6 - 2.6 mg/dL Final    COVID19 08/15/2023 Not Detected  Not Detected - Ref. Range Final    Influenza A PCR 08/15/2023 Not Detected  Not Detected Final     Influenza B PCR 08/15/2023 Not Detected  Not Detected Final    Extra Tube 08/15/2023 Hold for add-ons.   Final    Auto resulted.    Extra Tube 08/15/2023 hold for add-on   Final    Auto resulted    Extra Tube 08/15/2023 Hold for add-ons.   Final    Auto resulted.    Extra Tube 08/15/2023 Hold for add-ons.   Final    Auto resulted    WBC 08/15/2023 8.16  3.40 - 10.80 10*3/mm3 Final    RBC 08/15/2023 5.13  4.14 - 5.80 10*6/mm3 Final    Hemoglobin 08/15/2023 16.0  13.0 - 17.7 g/dL Final    Hematocrit 08/15/2023 47.1  37.5 - 51.0 % Final    MCV 08/15/2023 91.8  79.0 - 97.0 fL Final    MCH 08/15/2023 31.2  26.6 - 33.0 pg Final    MCHC 08/15/2023 34.0  31.5 - 35.7 g/dL Final    RDW 08/15/2023 12.9  12.3 - 15.4 % Final    RDW-SD 08/15/2023 43.3  37.0 - 54.0 fl Final    MPV 08/15/2023 9.3  6.0 - 12.0 fL Final    Platelets 08/15/2023 240  140 - 450 10*3/mm3 Final    Neutrophil % 08/15/2023 50.4  42.7 - 76.0 % Final    Lymphocyte % 08/15/2023 39.7  19.6 - 45.3 % Final    Monocyte % 08/15/2023 7.0  5.0 - 12.0 % Final    Eosinophil % 08/15/2023 1.1  0.3 - 6.2 % Final    Basophil % 08/15/2023 0.7  0.0 - 1.5 % Final    Immature Grans % 08/15/2023 1.1 (H)  0.0 - 0.5 % Final    Neutrophils, Absolute 08/15/2023 4.11  1.70 - 7.00 10*3/mm3 Final    Lymphocytes, Absolute 08/15/2023 3.24 (H)  0.70 - 3.10 10*3/mm3 Final    Monocytes, Absolute 08/15/2023 0.57  0.10 - 0.90 10*3/mm3 Final    Eosinophils, Absolute 08/15/2023 0.09  0.00 - 0.40 10*3/mm3 Final    Basophils, Absolute 08/15/2023 0.06  0.00 - 0.20 10*3/mm3 Final    Immature Grans, Absolute 08/15/2023 0.09 (H)  0.00 - 0.05 10*3/mm3 Final    nRBC 08/15/2023 0.0  0.0 - 0.2 /100 WBC Final    Ethanol 08/15/2023 109 (H)  0 - 10 mg/dL Final    Ethanol % 08/15/2023 0.109  % Final    Color, UA 08/15/2023 Yellow  Yellow, Straw Final    Appearance, UA 08/15/2023 Clear  Clear Final    pH, UA 08/15/2023 5.5  5.0 - 8.0 Final    Specific Gravity,  08/15/2023 1.008  1.005 - 1.030 Final     Glucose, UA 08/15/2023 500 mg/dL (2+) (A)  Negative Final    Ketones, UA 08/15/2023 Negative  Negative Final    Bilirubin, UA 08/15/2023 Negative  Negative Final    Blood, UA 08/15/2023 Negative  Negative Final    Protein, UA 08/15/2023 Negative  Negative Final    Leuk Esterase, UA 08/15/2023 Negative  Negative Final    Nitrite, UA 08/15/2023 Negative  Negative Final    Urobilinogen, UA 08/15/2023 0.2 E.U./dL  0.2 - 1.0 E.U./dL Final    Fentanyl, Urine 08/15/2023 Negative  Negative Final    Ethanol 08/15/2023 41 (H)  0 - 10 mg/dL Final    Ethanol % 08/15/2023 0.041  % Final       Impression/Formulation:    ICD-10-CM ICD-9-CM   1. Alcohol use disorder, severe, dependence  F10.20 303.90   2. Bipolar I disorder, most recent episode mixed, severe with psychotic features  F31.64 296.64        CLINICAL MANEUVERING/INTERVENTIONS: Therapist utilized a person-centered approach to build and maintain rapport with group member.   Therapist promoted safe nonjudgmental environment by providing group members with unconditional positive regard.  Assisted member in processing above session content; acknowledged and normalized patient's thoughts, feelings and concerns.  Allowed patient to freely discuss issues without interruption or judgment. Provided safe, confidential environment to facilitate the development of positive therapeutic relationship and encourage open, honest communication. Therapist assisted group member with identifying and implementing healthier coping strategies and maintaining healthier boundaries. Assisted patient in identifying risk factors which would indicate the need for higher level of care including thoughts to harm self or others and/or self-harming behavior and encouraged patient to contact this office, call 911, or present to the nearest emergency room should any of these events occur. Pt agreeable to adhere to medication regimen as prescribed and report any side effects.  Discussed crisis intervention  services and means to access.  Patient adamantly and convincingly denies current suicidal or homicidal ideation or perceptual disturbance.      Therapist implemented motivational interviewing techniques to assist client with exploring and resolving ambivalence associated with commitment to change behaviors.  Yes  Therapist utilized dialectical behavior techniques to teach and model emotional regulation and relaxation.  No   Therapist applied cognitive behavioral strategies to facilitate identification of maladaptive patterns of thinking and behavior.  Yes     PLAN:    Continue [facility] CD PHP Phase  NA  Aftercare:  [facility] CD Outpatient Phase 2      Please note that portions of this note were completed with a voice recognition program. Efforts were made to edit dictation, but occasionally words are mistranscribed.     This document signed by Mulugeta Santos LCSW, August 31, 2023, 13:53 EDT

## 2023-09-01 ENCOUNTER — OFFICE VISIT (OUTPATIENT)
Dept: PSYCHIATRY | Facility: HOSPITAL | Age: 28
End: 2023-09-01
Payer: MEDICARE

## 2023-09-01 DIAGNOSIS — F10.20 ALCOHOL USE DISORDER, SEVERE, DEPENDENCE: Primary | ICD-10-CM

## 2023-09-01 DIAGNOSIS — F31.64 BIPOLAR I DISORDER, MOST RECENT EPISODE MIXED, SEVERE WITH PSYCHOTIC FEATURES: ICD-10-CM

## 2023-09-01 PROCEDURE — H0035 MH PARTIAL HOSP TX UNDER 24H: HCPCS

## 2023-09-01 NOTE — PROGRESS NOTES
"Adult Noon Group        Date: August 31, 2023  Time: 5338-8455     Type of Group:  Psychoeducation, Psychotherapy      Group  Content: Therapist completed group therapy session focused on Health Coping Skills. Group members discussed different types of healthy coping skills they have learned so far in PHP. Group members identified healthy vs. unhealthy coping skills they have used. Group members discussed plans to continue to develop healthy coping skills in order to be successful in maintaining in PHP.        Patient Response: Patient is cooperative with group. He discussed struggles with engaging in healthy coping.  He discussed that he could benefit from running again and then to start working out.   Patient discussed that he gets \"amped up\" and struggles to calm himself.  He discussed not sleeping last night and having to go to work tonight on no sleep.  "

## 2023-09-04 ENCOUNTER — APPOINTMENT (OUTPATIENT)
Dept: PSYCHIATRY | Facility: HOSPITAL | Age: 28
End: 2023-09-04
Payer: MEDICARE

## 2023-09-04 NOTE — PROGRESS NOTES
NAME: Eduardo Cannon  DATE: 09/01/2023    -- CD PHP --     Time:   3086-9885    Services Received This Date:    X 2 Psychotherapy Group   X 1 Education/Training Group   X 0 Individual Psychotherapy   X 1 Activity Therapy Group   X 0 MD Services (Ongoing)   X 0 MD Services (Initial)       DATA:          Psychotherapy Group (Check-in):  Therapist asked that each patient share a summary of how they're doing, including progress towards therapy goals and any new stressors that may have occurred, as well as any items they wish to put on today's agenda. Therapist provided empathy and support.     Education/Training Group: Group members received education about cognitive distortions. Therapist encouraged group members to share their thoughts and discuss this topic with one another. Therapist continued facilitation of group cohesiveness.     Patient Response:     Personal Assessment 0-10 Scale (10 worst)   Depression: 0  Anxiety: 0  Cravings: 0    Patient arrived in person and participated in therapy today. Patient is engaged in learning about cognitive distortions. Patient identifies that he struggles with jumping to conclusions about people.    ASSESSMENT:    Impression/Formulation:    ICD-10-CM ICD-9-CM   1. Alcohol use disorder, severe, dependence  F10.20 303.90   2. Bipolar I disorder, most recent episode mixed, severe with psychotic features  F31.64 296.64         CLINICAL MANUVERING/INTERVENTIONS:  Therapist utilized a person-centered approach to build rapport with patient.  Therapist implemented motivational interviewing techniques to assist patient with exploring personal growth and change.   Therapist applied cognitive behavioral strategies to facilitate identification of maladaptive patterns of thinking and behavior.  Therapist employed group interaction activities to build rapport among group members, promote healthy lifestyle, and emphasize improvement of symptoms.  Therapist promoted safe nonjudgmental  environment by providing group members with unconditional positive regard and encouraging group members to comply with group rules and guidelines.  Therapist utilized dialectical behavior techniques to teach and model emotional regulation and relaxation.  Therapist assisted group member with identifying and implementing healthier coping strategies.  Group member was encouraged to make positive daily choices, and maintain healthy boundaries. Group format provides experiential learning of the benefit of sharing openly with others.     PLAN:  Continue [facility].  Aftercare:  Step down to IOP level of care     This document signed by Clara Patton [facility] Center[facility], September 4, 2023, 14:47 EDT

## 2023-09-06 ENCOUNTER — OFFICE VISIT (OUTPATIENT)
Dept: PSYCHIATRY | Facility: HOSPITAL | Age: 28
End: 2023-09-06
Payer: MEDICARE

## 2023-09-06 DIAGNOSIS — F31.64 BIPOLAR I DISORDER, MOST RECENT EPISODE MIXED, SEVERE WITH PSYCHOTIC FEATURES: ICD-10-CM

## 2023-09-06 DIAGNOSIS — F10.20 ALCOHOL USE DISORDER, SEVERE, DEPENDENCE: Primary | ICD-10-CM

## 2023-09-06 PROCEDURE — H0035 MH PARTIAL HOSP TX UNDER 24H: HCPCS | Performed by: SOCIAL WORKER

## 2023-09-06 NOTE — PROGRESS NOTES
NAME: Eduardo Cannon  DATE: 09/06/2023    [facility] Phase  NA  [phone]    Services Provided    Group Psychotherapy x 2  Education/Training x 1  Activity Therapy  x 1  Individual Therapy x 0 0 minutes  Family Therapy x 0  MD Initial Visit  x 0  MD Follow-Up   x 0    Number of participants: 3    DATA:  Patient reported having been so tired yesterday that he had fallen asleep with his clothes on. This was the reason he had missed group. Patient reported having been madder than hell earlier today, but he supposed he was calming down now. 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 He was also upset with the court for having chosen to hold his bond until the next court date three months from now.     Something else that was frustrating today was his medication cost. He had not been able to fill the prescription because he could not afford it.     Regarding alcohol he reported last Thursday was the last time he had a drink. He supposed it was Wednesday or Thursday. He could not recall exactly.     The patient also spent some time complaining about large sums of money he had missed out on in the past. He recalled an occasion 3-4 years ago when he had invested in stock, and the stock had decreased in value when someone chose to create more shares. He shared another occasion in which he had chosen to sell his stock, only to discover a few days later that the stock had increased in value exponentially. He concluded that he had missed out on a quarter-million dollars.     Individual: No    Homework: None assigned    Group 1 (Psychotherapy: Check-ins): Therapist continued facilitation of rapport building strategies between group members. Therapist asked that each patient check in with home life and recovery efforts and identify triggers, cravings, and high risk situations that arise between group sessions.  Group members discussed barriers and benefits of attending  recovery meetings.  Therapist provided empathy and support during group session. Daily Reading: The [employer] Gospel (1990) by Familia Boyer    Group 2  (Recreation Therapy) [employer] game    Group 3 (12 Steps) Discussed the spiritual principle of honesty using the NA Step Working Guide.    Group 4 (Mindfulness) Viewed the video, What you practice grows stronger (2017) by [employer] with Blanca Aguirre on YouTube.      ASSESSMENT:    Personal Assessment 0-10 Scale (10 worst)    Anxiety:  8 (dialing back now)  Depression:  0 (anger is high today)  Cravings: 2 (no comment)     MSE within normal limits? No Affect: angry and dysthymic Mood: depressed and irritable  Pt Response:  guarded  Engaged in activity/Process and self-disclosed: suboptimal  Applies today's group topics to self: suboptimal  Able to give and receive feedback: suboptimal  Demonstrated insightful thinking: occasional and suboptimal  Other pt response comments:  Patient was a positive participant. They demonstrated a relatively pessimistic attitude, a mild degree of motivation, and moderate insight, as evidenced by his level of engagement combined with his manner of engagement. They seemed interested and distracted. They appeared to have a fair degree of comprehension regarding the concepts being discussed . The patient seemed doubtful of, and somewhat willing to implement, the ideas being discussed. Their thought process appeared circumstantial , and their speech was regular rate and rhythm. The patient seemed to be experiencing difficulty assuming responsibility for certain actions today, instead blaming others for his circumstances. It's likely he realized he was doing this, and he simply desired to complain in order to vent his frustrations. Will continue to assess.       Community Group Engagement:  No: not engaged    Reported relapse since last meeting? No: last use of alcohol nearly one week ago    .  Office Visit on 08/30/2023   Component Date Value  Ref Range Status    External Amphetamine Screen Urine 08/30/2023 Negative   Final    External Benzodiazepine Screen Uri* 08/30/2023 Negative   Final    External Cocaine Screen Urine 08/30/2023 Negative   Final    External THC Screen Urine 08/30/2023 Negative   Final    External Methadone Screen Urine 08/30/2023 Negative   Final    External Methamphetamine Screen Ur* 08/30/2023 Negative   Final    External Oxycodone Screen Urine 08/30/2023 Negative   Final    External Buprenorphine Screen Urine 08/30/2023 Negative   Final    External MDMA 08/30/2023 Negative   Final    External Opiates Screen Urine 08/30/2023 Negative   Final   Admission on 08/15/2023, Discharged on 08/17/2023   Component Date Value Ref Range Status    QT Interval 08/15/2023 380  ms Final    QTC Interval 08/15/2023 412  ms Final       Impression/Formulation:    ICD-10-CM ICD-9-CM   1. Alcohol use disorder, severe, dependence  F10.20 303.90   2. Bipolar I disorder, most recent episode mixed, severe with psychotic features  F31.64 296.64        CLINICAL MANEUVERING/INTERVENTIONS: Therapist utilized a person-centered approach to build and maintain rapport with group member.   Therapist promoted safe nonjudgmental environment by providing group members with unconditional positive regard.  Assisted member in processing above session content; acknowledged and normalized patient's thoughts, feelings and concerns.  Allowed patient to freely discuss issues without interruption or judgment. Provided safe, confidential environment to facilitate the development of positive therapeutic relationship and encourage open, honest communication. Therapist assisted group member with identifying and implementing healthier coping strategies and maintaining healthier boundaries. Assisted patient in identifying risk factors which would indicate the need for higher level of care including thoughts to harm self or others and/or self-harming behavior and encouraged patient to  contact this office, call 911, or present to the nearest emergency room should any of these events occur. Pt agreeable to adhere to medication regimen as prescribed and report any side effects.  Discussed crisis intervention services and means to access.  Patient adamantly and convincingly denies current suicidal or homicidal ideation or perceptual disturbance.      Therapist implemented motivational interviewing techniques to assist client with exploring and resolving ambivalence associated with commitment to change behaviors.  Yes  Therapist utilized dialectical behavior techniques to teach and model emotional regulation and relaxation.  Yes   Therapist applied cognitive behavioral strategies to facilitate identification of maladaptive patterns of thinking and behavior.  Yes     PLAN:    Continue [facility] PHP Phase  NA  Aftercare:  [facility] CD Outpatient Phase 2      Please note that portions of this note were completed with a voice recognition program. Efforts were made to edit dictation, but occasionally words are mistranscribed.     This document signed by Mulugeta Santos LCSW, September 6, 2023, 09:33 EDT

## 2023-09-07 ENCOUNTER — OFFICE VISIT (OUTPATIENT)
Dept: PSYCHIATRY | Facility: HOSPITAL | Age: 28
End: 2023-09-07
Payer: MEDICARE

## 2023-09-07 DIAGNOSIS — F31.64 BIPOLAR I DISORDER, MOST RECENT EPISODE MIXED, SEVERE WITH PSYCHOTIC FEATURES: ICD-10-CM

## 2023-09-07 DIAGNOSIS — Z79.899 MEDICATION MANAGEMENT: Primary | ICD-10-CM

## 2023-09-07 DIAGNOSIS — F10.20 ALCOHOL USE DISORDER, SEVERE, DEPENDENCE: Primary | ICD-10-CM

## 2023-09-07 LAB
EXTERNAL AMPHETAMINE SCREEN URINE: NEGATIVE
EXTERNAL BENZODIAZEPINE SCREEN URINE: POSITIVE
EXTERNAL BUPRENORPHINE SCREEN URINE: NEGATIVE
EXTERNAL COCAINE SCREEN URINE: NEGATIVE
EXTERNAL MDMA: NEGATIVE
EXTERNAL METHADONE SCREEN URINE: NEGATIVE
EXTERNAL METHAMPHETAMINE SCREEN URINE: NEGATIVE
EXTERNAL OPIATES SCREEN URINE: NEGATIVE
EXTERNAL OXYCODONE SCREEN URINE: NEGATIVE
EXTERNAL THC SCREEN URINE: NEGATIVE

## 2023-09-07 PROCEDURE — H0035 MH PARTIAL HOSP TX UNDER 24H: HCPCS | Performed by: SOCIAL WORKER

## 2023-09-07 PROCEDURE — 1159F MED LIST DOCD IN RCRD: CPT | Performed by: PSYCHIATRY & NEUROLOGY

## 2023-09-07 PROCEDURE — 1160F RVW MEDS BY RX/DR IN RCRD: CPT | Performed by: PSYCHIATRY & NEUROLOGY

## 2023-09-07 RX ORDER — RISPERIDONE 0.5 MG/1
0.5 TABLET ORAL 2 TIMES DAILY
Qty: 14 TABLET | Refills: 0 | Status: SHIPPED | OUTPATIENT
Start: 2023-09-07

## 2023-09-07 RX ORDER — CHLORDIAZEPOXIDE HYDROCHLORIDE 10 MG/1
10 CAPSULE, GELATIN COATED ORAL 3 TIMES DAILY PRN
Qty: 14 CAPSULE | Refills: 0 | Status: SHIPPED | OUTPATIENT
Start: 2023-09-07

## 2023-09-07 RX ORDER — AMOXICILLIN 500 MG/1
500 CAPSULE ORAL 2 TIMES DAILY
Qty: 20 CAPSULE | Refills: 0 | Status: SHIPPED | OUTPATIENT
Start: 2023-09-07
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
## 2023-09-07 NOTE — PROGRESS NOTES
Adult PHP Group      Date: September 7, 2023  Time: 7231-6478    Type of Group:  Psychotherapy    Group  Content: Therapist facilitated group therapy session focused on visualizing success. Group members defined what success means to them. Group members described what they imagine them being successful would be like. Group members identified relationships, hobbies, material objects, emotions, etc that would be added to their lives if they were their most successful selves. They identified relationships, hobbies, etc that would be removed from their lives if they were their most successful selves. Group members identified one goal they will accomplish that will increase their success in life.      Patient Response: Patient was cooperative with group. Patient identified being successful as staying out of trouble, being financially stable, being healthy, and staying sober. Patient reports that if he were living his most successful life, he would be living off the grid (possibly in [place]), feel at peace, and be secure. Patient reports he would not have the government or societal expectations weighing him down. He identified a goal that will help him be successful as stopping gambling. Patient reports he is considering investing in [place] [employer] to keep him busy and prepare himself for the future. Patient referenced the economy closing down soon.       Nelia Hendrix  09/07/23  16:54 EDT

## 2023-09-07 NOTE — PROGRESS NOTES
[facility]  [address] [place], [place] [address]    Partial Hospitalization Program for Chemical Dependency (CD PHP)      Initial Individualized Treatment Plan       Long Term Goal:  Eduardo will establish a sustained recovery and will maintain total abstinence from mind-altering substances other than what is prescribed and/or approved by the attending physician. Pt will learn, practice, and utilize behavioral and cognitive coping skills to help maintain sobriety.    Patient Care Needs:  Eduardo presents with alcohol use disorder and is at high risk for relapse without continued treatment.  Pt has a history of using drugs/alcohol until intoxicated or passed out and has been unable to stop or cut down use of alcohol/drugs once starting, despite verbalized desire to do so, and the negative consequences from continued use.  Pt's use has negatively impacted mental, social, occupational, and/or physical functioning.     Program Goals:    1.  Patient will participate in a medical evaluation to assess mental health and will cooperate with an evaluation by the attending psychiatrist or psychiatric nurse practitioner for psychotropic medication if appropriate.        2.  Patient will adhere to the PHP group rules.      3.  Patient will attend group sessions as scheduled. Patient will notify therapist and support staff of any absences or tardies. Patient will provide a valid and verifiable excuse for absences to be considered excused.  When present patient will participate by giving and receiving feedback appropriately.    4. Patient will participate in random drug and alcohol screenings and will exhibit negative results on said screenings.    5.  Patient will identify high risk situations, people and places to avoid to maintain sobriety.    6. Patient will develop a positive network of support by attending a minimum of two recovery/spiritual support groups each week (two outside of IOP group)  and by exploring, obtaining or maintaining a sponsor or sober support person.      7. Patient will identify, practice, and implement at least 5 healthy coping strategies to manage difficult emotions while remaining abstinent.    8.  Patient will encourage family participation in education sessions, if appropriate.    9.  Patient will exhibit increased motivation to change as assessed by observable behaviors in conjunction with the ASAM assessment tool.  Patient will utilize healthy coping skills to manage depressive and anxious symptoms and will exhibit decreased score on the PHQ-9 and KADEEM-7.     Individualized Goals:    1. Goal Progress:    1. Patient participated in an evaluation for medication management with Dr. Upton. Patient is meeting with medical provider as scheduled for medication management and follow-up.            2.  New goal.  Will continue to monitor.        3.   New goal.  Will continue to monitor.                          4.  New goal.  Will continue to monitor.  Baseline UDS was alcohol.         5.  New goal.  Will continue to monitor. He reported having few associates these days, and he was exclusively going to group, work, and home currently.      6.  Patient is not attending recovery/spiritual support group meetings at least 1 times per week. Patient does not have a sponsor/sober support person and is not working the 12 steps. He reported not doing to well with people in general. His interest level was at 25% today.     7.  New goal.  Will continue to monitor. He enjoyed watching motivational videos online. Television.             8.  Declined family participation to date.          9.  Baseline ASAM, KADEEM and PHQ:     KADEEM 15  PHQ 18    ASAM Dimensions:  I.    Intoxication/Withdrawal:  2  (continued usage of alcohol)  II.   Medical Conditions/Complications:  0 (no known conditions)  III.  Behavioral/Emotional/Cognitive: 2 (episodes of kita, anxiety, feelings of hopelessness)  IV.  Readiness to  Change: 2 (extrinsically motivated)  V.   Relapse Risk: 2 (recent relapse)  VI:  Recovery Environment: 1 (isolation)  Total ASAM Score = 9                       Goal Progress:    1. Ongoing Goal:    1.  Partially completed.  Patient participated initial evaluation for medication management with Dr. Upton.  Patient will continue participating in as scheduled medication management and follow-up appointments with psychiatric medical provider.    2.  Patient will continue working toward objective.       3.  Patient will continue working toward objective.                         4.  Patient will continue working toward objective.          5.  Patient will continue working toward objective.        6.  Patient will continue working toward objective.               7. Anxiety and depression will continue to be monitored.                   8. Patient and therapist will continue to encourage family participation as appropriate.    9.  Patient will continue working toward objective.                        Ongoing Goal:    1.        Next Target Date: 9/21/2023    Team Members:  Patient - Eduardo Cannon  Medical Provider - Dr. Padilla Upton  [facility] Therapist - Mulugeta Santos LCSW  [facility] Director - Tucker Valero LCSW, [facility]  Support Staff

## 2023-09-07 NOTE — PROGRESS NOTES
"   NAME: Eduardo Cannon  DATE: 09/07/2023    CD [facility] Phase  NA  [phone]    Services Provided    Group Psychotherapy x 2  Education/Training x 2  Activity Therapy  x 0  Individual Therapy x 1 30 minutes  Family Therapy x 0  MD Initial Visit  x 0  MD Follow-Up   x 1    Number of participants: 3    DATA:  Patient reported he thought he was doing alright now. He drank \"a few Guinness\" yesterday afternoon, and two this morning after he had gotten off work. He reported needing to calm down. Yesterday, he reported believing that if the police had shown up, and if they had seen him he probably would have gone back to residential. He had gotten paid, and he had gambled all his money away. He reported having struggled with thoughts of choking someone (no one in particular) or shooting himself. He reported the alcohol helped him calm down. Reflecting on this now, he supposed that he could never see himself completely sober.     He felt like he should have gone to the [employer] yesterday, but he chose not to because he didn't want to lose his job. Then he would not be able to pay for the [employer], and he would end up in residential.     He denied suicidal thoughts today. He denied homicidal thoughts as well. He reported that in order for him to be violent toward someone it would take someone to trigger him, and nothing like that had happened yet. Also, all his firearms had been confiscated, so the means by which he would occasionally contemplate suicide was not accessible to him. Regarding his feelings of irritability, he reported being angry with the world in general. He reported believing that society had gone \"down the tubes,\" and that some people were not worthy of life, himself included at times. The therapist cautioned him on this way of thinking, suggesting that it would backfire on us (judgmental thoughts transforming into self-loathing). As an illustration of a way he would sometimes  people, he shared that someone not " "saying, \"Thank you,\" when you opened the door for them was justly identifiable as, \"A piece of shit,\" in his mind. He did not seem open to consideration of alternative interpretations at this time.      Individual: Yes, describe: Initiated treatment plans.    Homework: None assigned    Group 1 (Psychotherapy: Check-ins): Therapist continued facilitation of rapport building strategies between group members. Therapist asked that each patient check in with home life and recovery efforts and identify triggers, cravings, and high risk situations that arise between group sessions.  Group members discussed barriers and benefits of attending recovery meetings.  Therapist provided empathy and support during group session. Daily Reading: The Weight of Hailey (1939) JARVIS Beltran    Group 2  (Bibliotherapy) Read an excerpt from the essay, \"On Forgiveness,\" emphasizing the difference between excusing responsibility and accepting forgiveness. Emphasized again the importance of practicing honesty in recovery, not exclusively as a reaction to relapse but also as a preventative measure.     Group 3 (Psychoeducation) This hour of group was facilitated by [facility].    Group 4 (Psychotherapy) This hour of group was facilitated by NEGRITO Gambino.      ASSESSMENT:    Personal Assessment 0-10 Scale (10 worst)    Anxiety:  7 (no comment)  Depression:  4 (yesterday it was at 8)  Cravings: 4 (no comment)     MSE within normal limits? No Affect: dysthymic Mood: depressed  Pt Response:  defensive and ambivalent  Engaged in activity/Process and self-disclosed: suboptimal  Applies today's group topics to self: suboptimal  Able to give and receive feedback: suboptimal  Demonstrated insightful thinking: inconsistent and suboptimal  Other pt response comments:  Patient was a positive participant. They demonstrated a relatively pessimistic attitude, ambivalence, and mild insight, as evidenced by his level of engagement combined with his " cynical comments that were not entirely on-track with group discussion. They seemed interested and distracted. They appeared to have a fair degree of comprehension regarding the concepts being discussed . The patient seemed doubtful of, and somewhat willing to implement, the ideas being discussed. Their thought process appeared circumstantial , and their speech was regular rate and rhythm. He adamantly and convincingly denied suicidal and homicidal thoughts. He knew he could go to the ER or contact emergency services if these things escalated.       Community Group Engagement:  No: declined, saying he is not a people person    Reported relapse since last meeting? Yes: alcohol last night and this morning    .  Orders Only on 09/07/2023   Component Date Value Ref Range Status    External Amphetamine Screen Urine 09/07/2023 Negative   Final    External Benzodiazepine Screen Uri* 09/07/2023 Positive (A)   Final    External Cocaine Screen Urine 09/07/2023 Negative   Final    External THC Screen Urine 09/07/2023 Negative   Final    External Methadone Screen Urine 09/07/2023 Negative   Final    External Methamphetamine Screen Ur* 09/07/2023 Negative   Final    External Oxycodone Screen Urine 09/07/2023 Negative   Final    External Buprenorphine Screen Urine 09/07/2023 Negative   Final    External MDMA 09/07/2023 Negative   Final    External Opiates Screen Urine 09/07/2023 Negative   Final   Office Visit on 08/30/2023   Component Date Value Ref Range Status    External Amphetamine Screen Urine 08/30/2023 Negative   Final    External Benzodiazepine Screen Uri* 08/30/2023 Negative   Final    External Cocaine Screen Urine 08/30/2023 Negative   Final    External THC Screen Urine 08/30/2023 Negative   Final    External Methadone Screen Urine 08/30/2023 Negative   Final    External Methamphetamine Screen Ur* 08/30/2023 Negative   Final    External Oxycodone Screen Urine 08/30/2023 Negative   Final    External Buprenorphine Screen  Urine 08/30/2023 Negative   Final    External MDMA 08/30/2023 Negative   Final    External Opiates Screen Urine 08/30/2023 Negative   Final       Impression/Formulation:    ICD-10-CM ICD-9-CM   1. Alcohol use disorder, severe, dependence  F10.20 303.90   2. Bipolar I disorder, most recent episode mixed, severe with psychotic features  F31.64 296.64        CLINICAL MANEUVERING/INTERVENTIONS: Therapist utilized a person-centered approach to build and maintain rapport with group member.   Therapist promoted safe nonjudgmental environment by providing group members with unconditional positive regard.  Assisted member in processing above session content; acknowledged and normalized patient's thoughts, feelings and concerns.  Allowed patient to freely discuss issues without interruption or judgment. Provided safe, confidential environment to facilitate the development of positive therapeutic relationship and encourage open, honest communication. Therapist assisted group member with identifying and implementing healthier coping strategies and maintaining healthier boundaries. Assisted patient in identifying risk factors which would indicate the need for higher level of care including thoughts to harm self or others and/or self-harming behavior and encouraged patient to contact this office, call 911, or present to the nearest emergency room should any of these events occur. Pt agreeable to adhere to medication regimen as prescribed and report any side effects.  Discussed crisis intervention services and means to access.  Patient adamantly and convincingly denies current suicidal or homicidal ideation or perceptual disturbance.      Therapist implemented motivational interviewing techniques to assist client with exploring and resolving ambivalence associated with commitment to change behaviors.  Yes  Therapist utilized dialectical behavior techniques to teach and model emotional regulation and relaxation.  No   Therapist  applied cognitive behavioral strategies to facilitate identification of maladaptive patterns of thinking and behavior.  Yes     PLAN:    Continue [facility] CD PHP Phase  NA  Aftercare:  [facility] CD Out[facility] Phase 2      Please note that portions of this note were completed with a voice recognition program. Efforts were made to edit dictation, but occasionally words are mistranscribed.     This document signed by Mulugeta Santos LCSW, September 7, 2023, 14:59 EDT

## 2023-09-08 ENCOUNTER — OFFICE VISIT (OUTPATIENT)
Dept: PSYCHIATRY | Facility: HOSPITAL | Age: 28
End: 2023-09-08
Payer: MEDICARE

## 2023-09-08 DIAGNOSIS — F10.20 ALCOHOL USE DISORDER, SEVERE, DEPENDENCE: Primary | ICD-10-CM

## 2023-09-08 DIAGNOSIS — F31.64 BIPOLAR I DISORDER, MOST RECENT EPISODE MIXED, SEVERE WITH PSYCHOTIC FEATURES: ICD-10-CM

## 2023-09-08 NOTE — PROGRESS NOTES
"NAME: Eduardo Cannon  DATE: 09/08/2023    -- CD PHP --     Time:   0560-8810    Services Received This Date:    X 2 Psychotherapy Group   X 2 Education/Training Group     DATA:          Psychotherapy Group (Check-in):  Therapist asked that each patient share a summary of how they're doing, including progress towards therapy goals and any new stressors that may have occurred, as well as any items they wish to put on today's agenda. Therapist provided empathy and support. Check-in group also allowed patients to introduce themselves to this therapist who was filling in.  We discussed the stigma that goes along with mental health care.  Patient disclosed during this group that he struggles with \"knowing what's real\" but doesn't like to discuss it.  Tries to suppress it so that his whole life doesn't revolve around this.  Also shared about how his life fell apart and he \"lost everything.\" Therapist affirmed him for sharing.     Psychotherapy Group: This group focused on DBT stills to aid in relaxation, self-soothing, cravings, preventing anger response, and sleep Therapist encourages group members to share and respond to one another about their experience with this process.  Patient engaged in box breathing, stated he knows that it helps but he wasn't eager to engage. We did box breathing again later in conjunction with guided imagery, patient nearly fell asleep.  During guided imagery, luis alberto imagined a brick long term home with stone walls an supriya furniture.  He imagined drinking scotch and smoking a cigar, despite being directed to not engage any unhealthy habits in the imagery.  Confronted about the reason for court ordered treatment, agreed it was liquor and agreed it was a problem.      Education/Training Group: Group members received education by PASCALE Quintana. Therapist encouraged group members to share their thoughts and discuss this topic with one another. Therapist continued facilitation of group " "cohesiveness. See notes by Alan for more details.     Education/Training Group: Group members received education by PASCALE Quintana. Therapist encouraged group members to share their thoughts and discuss this topic with one another. Therapist continued facilitation of group cohesiveness. See notes by Alan for more details.    Patient Response:     Personal Assessment 0-10 Scale (10 worst)   Depression: 5  Anxiety: 7     Patient shared as indicated above in each of the groups.    ASSESSMENT:    Patient arrived a few minutes late and participated in therapy today. Patient was alert, oriented, engaged, and participated in sessions, sometimes reluctantly at first, but did engage in all discussion and exercises, seemed very tired due to just getting off work.  Also seems to continue to glamourize his drinking as evidenced by engaging guided imagery with scotch.  Seems only motivated by court order, but also opened up and acknowledged what seems to be underlying struggle with psychosis or \"not knowing what is real.\"  Was very guarded with certain information, [place]    Impression/Formulation:    ICD-10-CM ICD-9-CM   1. Alcohol use disorder, severe, dependence  F10.20 303.90   2. Bipolar I disorder, most recent episode mixed, severe with psychotic features  F31.64 296.64         CLINICAL MANUVERING/INTERVENTIONS:  Therapist utilized a person-centered approach to build rapport with patient.  Therapist implemented motivational interviewing techniques to assist patient with exploring personal growth and change.   Therapist applied cognitive behavioral strategies to facilitate identification of maladaptive patterns of thinking and behavior.  Therapist employed group interaction activities to build rapport among group members, promote healthy lifestyle, and emphasize improvement of symptoms.  Therapist promoted safe nonjudgmental environment by providing group members with " unconditional positive regard and encouraging group members to comply with group rules and guidelines.  Therapist utilized dialectical behavior techniques to teach and model emotional regulation and relaxation.  Therapist assisted group member with identifying and implementing healthier coping strategies.  Group member was encouraged to make positive daily choices, and maintain healthy boundaries. Group format provides experiential learning of the benefit of sharing openly with others.     PLAN:  Continue [facility].  Aftercare:  Step down to IOP level of care     This document signed by Gillian Castro LCSW, September 8, 2023, 10:44 EDT

## 2023-09-11 ENCOUNTER — OFFICE VISIT (OUTPATIENT)
Dept: PSYCHIATRY | Facility: HOSPITAL | Age: 28
End: 2023-09-11
Payer: MEDICARE

## 2023-09-11 DIAGNOSIS — F10.20 ALCOHOL USE DISORDER, SEVERE, DEPENDENCE: Primary | ICD-10-CM

## 2023-09-11 DIAGNOSIS — F31.60 BIPOLAR AFFECTIVE DISORDER, MIXED: ICD-10-CM

## 2023-09-11 DIAGNOSIS — Z79.899 MEDICATION MANAGEMENT: ICD-10-CM

## 2023-09-11 PROCEDURE — H0035 MH PARTIAL HOSP TX UNDER 24H: HCPCS | Performed by: SOCIAL WORKER

## 2023-09-11 NOTE — PROGRESS NOTES
"   NAME: Eduardo Cannon  DATE: 2023    CD [facility] Phase  NA  [phone]    Services Provided    Group Psychotherapy x 2  Education/Training x 2  Activity Therapy  x 0  Individual Therapy x 0 0 minutes  Family Therapy x 0  MD Initial Visit  x 0  MD Follow-Up   x 0    Number of participants: 3    DATA:  Patient reported he was doing pretty good today. He speculated that having two days off two days this past weekend was nice. However, he also reported having used alcohol last night, as well as having been manic this weekend. He had started taking the Ripserdone but was hesitant to start Depakote until after he was sober for a while. This weekend he experienced pacing behavior, disorganized thoughts, and gambling. However, he was pleased to report that there was no anger at this time. Regarding the alcohol use, he reported having used an entire pint of fireball and a half fifth of baileys. He speculated that the cravings had gotten to him. He described it like a pit in his stomach that only a buzz could fill.     Individual: No    Homework: None assigned    Group 1 (Psychotherapy: Check-ins): Therapist continued facilitation of rapport building strategies between group members. Therapist asked that each patient check in with home life and recovery efforts and identify triggers, cravings, and high risk situations that arise between group sessions.  Group members discussed barriers and benefits of attending recovery meetings.  Therapist provided empathy and support during group session. Daily Readin Rules for Life (2018) by Varun Robert    Group 2  (Bibliotherapy) Read from \"Rule 8 / Tell The Truth-- or, at least, don't lie,\" emphasizing the importance of practicing the spiritual principle of honesty in recovery.    Group 3 (Vulnerability) Viewed the video, The Power of Vulnerability () by LIAM with Corinne Almaguer on YouTube. Emphasized the importance of making meaningful connections with people.     Group 4 " (Anxiety Relief) Viewed the video, Best 3 Tips on Worry, Anxiety, and Turning Down the Stress Response (2023) by Miranda Roldan and Livan Millan on YouTube.      ASSESSMENT:    Personal Assessment 0-10 Scale (10 worst)    Anxiety:  3 (no comment)  Depression:  1 (no comment)  Cravings: 1 (alcohol use last night)     MSE within normal limits? Yes Affect: euthymic Mood:  elevated  Pt Response:  open/receptive  Engaged in activity/Process and self-disclosed: suboptimal  Applies today's group topics to self: suboptimal  Able to give and receive feedback: suboptimal  Demonstrated insightful thinking: occasional and suboptimal  Other pt response comments:  Patient was a positive participant. They demonstrated a relatively positive attitude, ambivalence, and mild insight, as evidenced by his tendency to turn the subject toward society rather than himself combined with his having relapsed for the second time yesterday. They seemed interested and distracted. They appeared to comprehend well the concepts being discussed. The patient seemed receptive of, and willing to implement, the ideas being discussed. Their thought process appeared circumstantial , and their speech was pressured.       Community Group Engagement:  No: not engaged    Reported relapse since last meeting? Yes: a fifth and a half last night    .  Office Visit on 09/11/2023   Component Date Value Ref Range Status    External Amphetamine Screen Urine 09/11/2023 Negative   Final    External Benzodiazepine Screen Uri* 09/11/2023 Positive (A)   Final    External Cocaine Screen Urine 09/11/2023 Negative   Final    External THC Screen Urine 09/11/2023 Negative   Final    External Methadone Screen Urine 09/11/2023 Negative   Final    External Methamphetamine Screen Ur* 09/11/2023 Negative   Final    External Oxycodone Screen Urine 09/11/2023 Negative   Final    External Buprenorphine Screen Urine 09/11/2023 Negative   Final    External MDMA 09/11/2023 Negative   Final     External Opiates Screen Urine 09/11/2023 Negative   Final   Orders Only on 09/07/2023   Component Date Value Ref Range Status    External Amphetamine Screen Urine 09/07/2023 Negative   Final    External Benzodiazepine Screen Uri* 09/07/2023 Positive (A)   Final    External Cocaine Screen Urine 09/07/2023 Negative   Final    External THC Screen Urine 09/07/2023 Negative   Final    External Methadone Screen Urine 09/07/2023 Negative   Final    External Methamphetamine Screen Ur* 09/07/2023 Negative   Final    External Oxycodone Screen Urine 09/07/2023 Negative   Final    External Buprenorphine Screen Urine 09/07/2023 Negative   Final    External MDMA 09/07/2023 Negative   Final    External Opiates Screen Urine 09/07/2023 Negative   Final   Office Visit on 08/30/2023   Component Date Value Ref Range Status    External Amphetamine Screen Urine 08/30/2023 Negative   Final    External Benzodiazepine Screen Uri* 08/30/2023 Negative   Final    External Cocaine Screen Urine 08/30/2023 Negative   Final    External THC Screen Urine 08/30/2023 Negative   Final    External Methadone Screen Urine 08/30/2023 Negative   Final    External Methamphetamine Screen Ur* 08/30/2023 Negative   Final    External Oxycodone Screen Urine 08/30/2023 Negative   Final    External Buprenorphine Screen Urine 08/30/2023 Negative   Final    External MDMA 08/30/2023 Negative   Final    External Opiates Screen Urine 08/30/2023 Negative   Final       Impression/Formulation:    ICD-10-CM ICD-9-CM   1. Alcohol use disorder, severe, dependence  F10.20 303.90   2. Bipolar affective disorder, mixed  F31.60 296.60   3. Medication management  Z79.899 V58.69        CLINICAL MANEUVERING/INTERVENTIONS: Therapist utilized a person-centered approach to build and maintain rapport with group member.   Therapist promoted safe nonjudgmental environment by providing group members with unconditional positive regard.  Assisted member in processing above session  content; acknowledged and normalized patient's thoughts, feelings and concerns.  Allowed patient to freely discuss issues without interruption or judgment. Provided safe, confidential environment to facilitate the development of positive therapeutic relationship and encourage open, honest communication. Therapist assisted group member with identifying and implementing healthier coping strategies and maintaining healthier boundaries. Assisted patient in identifying risk factors which would indicate the need for higher level of care including thoughts to harm self or others and/or self-harming behavior and encouraged patient to contact this office, call 911, or present to the nearest emergency room should any of these events occur. Pt agreeable to adhere to medication regimen as prescribed and report any side effects.  Discussed crisis intervention services and means to access.  Patient adamantly and convincingly denies current suicidal or homicidal ideation or perceptual disturbance.      Therapist implemented motivational interviewing techniques to assist client with exploring and resolving ambivalence associated with commitment to change behaviors.  Yes  Therapist utilized dialectical behavior techniques to teach and model emotional regulation and relaxation.  Yes   Therapist applied cognitive behavioral strategies to facilitate identification of maladaptive patterns of thinking and behavior.  Yes     PLAN:    Continue [facility]  PHP Phase  NA  Aftercare:  [facility]  Outpatient Phase 2      Please note that portions of this note were completed with a voice recognition program. Efforts were made to edit dictation, but occasionally words are mistranscribed.     This document signed by Mulugeta Santos LCSW, September 11, 2023, 16:08 EDT

## 2023-09-12 ENCOUNTER — OFFICE VISIT (OUTPATIENT)
Dept: PSYCHIATRY | Facility: HOSPITAL | Age: 28
End: 2023-09-12
Payer: MEDICARE

## 2023-09-12 DIAGNOSIS — F10.20 ALCOHOL USE DISORDER, SEVERE, DEPENDENCE: Primary | ICD-10-CM

## 2023-09-12 DIAGNOSIS — F31.60 BIPOLAR AFFECTIVE DISORDER, MIXED: ICD-10-CM

## 2023-09-12 PROCEDURE — H0035 MH PARTIAL HOSP TX UNDER 24H: HCPCS | Performed by: SOCIAL WORKER

## 2023-09-12 NOTE — PROGRESS NOTES
NAME: Eduardo Cannon  DATE: 09/12/2023    [facility] Phase  NA  [phone]    Services Provided    Group Psychotherapy x 2  Education/Training x 2  Activity Therapy  x 0  Individual Therapy x 0 0 minutes  Family Therapy x 0  MD Initial Visit  x 0  MD Follow-Up   x 0    Number of participants: 3    DATA:  Patient reported having achieved no sleep last night. He reported an elevated mood. 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 He was feeling good. Patient reported having not used any alcohol since the last reported use on Sunday night. He had, however, gambled this morning. He went in with $40 and lost it all. He seemed to recognize that he was in a manic episode to some degree. He reported these episodes usually lasted a week at a time, and he was not looking forward to the depressive episode that tended to follow.     Individual: No    Homework: None assigned    Group 1 (Psychotherapy: Check-ins): Therapist continued facilitation of rapport building strategies between group members. Therapist asked that each patient check in with home life and recovery efforts and identify triggers, cravings, and high risk situations that arise between group sessions.  Group members discussed barriers and benefits of attending recovery meetings.  Therapist provided empathy and support during group session. Daily Reading: None    Group 2  (Recovery Testimonies) Viewed the first 2 interviews (Jocelyn Fletcher and Davonte Ridley) from the video, 10 Powerful Stories of Addiction and Recovery (2022) by Rich Roll on [employer].    Group 3 (Recovery Testimonies) Viewed the two additional interviews (Kartik Gavin and Olaf Daugherty) from the video, 10 Powerful Stories of Addiction and Recovery (2022) by Rich Roll on [email or web address].    Group 4 (Psychoeducation) This hour of group was facilitated by Alan Anderson [facility].      ASSESSMENT:    Personal Assessment 0-10 Scale (10 worst)    Anxiety:  0 (no comment)  Depression:  0 (no  comment)  Cravings: 0 (no comment)     MSE within normal limits? No Affect: euthymic Mood:  elevated  Pt Response:  open/receptive  Engaged in activity/Process and self-disclosed: suboptimal  Applies today's group topics to self: suboptimal  Able to give and receive feedback: suboptimal  Demonstrated insightful thinking: inconsistent and suboptimal  Other pt response comments:  Patient was a positive participant. They demonstrated a relatively positive attitude, a strong degree of motivation, and moderate insight, as evidenced by his level of engagement combined with his open-mindedness today. They seemed interested and attentive. They appeared to comprehend well the concepts being discussed. The patient seemed receptive of, and willing to implement, the ideas being discussed. Their thought process appeared circumstantial , and their speech was pressured.       Community Group Engagement:  No: not engaged    Reported relapse since last meeting? No: no lapse reported    .  Office Visit on 09/11/2023   Component Date Value Ref Range Status    External Amphetamine Screen Urine 09/11/2023 Negative   Final    External Benzodiazepine Screen Uri* 09/11/2023 Positive (A)   Final    External Cocaine Screen Urine 09/11/2023 Negative   Final    External THC Screen Urine 09/11/2023 Negative   Final    External Methadone Screen Urine 09/11/2023 Negative   Final    External Methamphetamine Screen Ur* 09/11/2023 Negative   Final    External Oxycodone Screen Urine 09/11/2023 Negative   Final    External Buprenorphine Screen Urine 09/11/2023 Negative   Final    External MDMA 09/11/2023 Negative   Final    External Opiates Screen Urine 09/11/2023 Negative   Final   Orders Only on 09/07/2023   Component Date Value Ref Range Status    External Amphetamine Screen Urine 09/07/2023 Negative   Final    External Benzodiazepine Screen Uri* 09/07/2023 Positive (A)   Final    External Cocaine Screen Urine 09/07/2023 Negative   Final     External THC Screen Urine 09/07/2023 Negative   Final    External Methadone Screen Urine 09/07/2023 Negative   Final    External Methamphetamine Screen Ur* 09/07/2023 Negative   Final    External Oxycodone Screen Urine 09/07/2023 Negative   Final    External Buprenorphine Screen Urine 09/07/2023 Negative   Final    External MDMA 09/07/2023 Negative   Final    External Opiates Screen Urine 09/07/2023 Negative   Final   Office Visit on 08/30/2023   Component Date Value Ref Range Status    External Amphetamine Screen Urine 08/30/2023 Negative   Final    External Benzodiazepine Screen Uri* 08/30/2023 Negative   Final    External Cocaine Screen Urine 08/30/2023 Negative   Final    External THC Screen Urine 08/30/2023 Negative   Final    External Methadone Screen Urine 08/30/2023 Negative   Final    External Methamphetamine Screen Ur* 08/30/2023 Negative   Final    External Oxycodone Screen Urine 08/30/2023 Negative   Final    External Buprenorphine Screen Urine 08/30/2023 Negative   Final    External MDMA 08/30/2023 Negative   Final    External Opiates Screen Urine 08/30/2023 Negative   Final       Impression/Formulation:    ICD-10-CM ICD-9-CM   1. Alcohol use disorder, severe, dependence  F10.20 303.90   2. Bipolar affective disorder, mixed  F31.60 296.60        CLINICAL MANEUVERING/INTERVENTIONS: Therapist utilized a person-centered approach to build and maintain rapport with group member.   Therapist promoted safe nonjudgmental environment by providing group members with unconditional positive regard.  Assisted member in processing above session content; acknowledged and normalized patient's thoughts, feelings and concerns.  Allowed patient to freely discuss issues without interruption or judgment. Provided safe, confidential environment to facilitate the development of positive therapeutic relationship and encourage open, honest communication. Therapist assisted group member with identifying and implementing  healthier coping strategies and maintaining healthier boundaries. Assisted patient in identifying risk factors which would indicate the need for higher level of care including thoughts to harm self or others and/or self-harming behavior and encouraged patient to contact this office, call 911, or present to the nearest emergency room should any of these events occur. Pt agreeable to adhere to medication regimen as prescribed and report any side effects.  Discussed crisis intervention services and means to access.  Patient adamantly and convincingly denies current suicidal or homicidal ideation or perceptual disturbance.      Therapist implemented motivational interviewing techniques to assist client with exploring and resolving ambivalence associated with commitment to change behaviors.  Yes  Therapist utilized dialectical behavior techniques to teach and model emotional regulation and relaxation.  No   Therapist applied cognitive behavioral strategies to facilitate identification of maladaptive patterns of thinking and behavior.  Yes     PLAN:    Continue [facility] PHP Phase  NA  Aftercare:  [facility] Outpatient Phase 2     Please note that portions of this note were completed with a voice recognition program. Efforts were made to edit dictation, but occasionally words are mistranscribed.     This document signed by Mulugeta Santos LCSW, September 12, 2023, 15:14 EDT

## 2023-09-13 ENCOUNTER — OFFICE VISIT (OUTPATIENT)
Dept: PSYCHIATRY | Facility: HOSPITAL | Age: 28
End: 2023-09-13
Payer: MEDICARE

## 2023-09-13 DIAGNOSIS — F10.20 ALCOHOL USE DISORDER, SEVERE, DEPENDENCE: Primary | ICD-10-CM

## 2023-09-13 DIAGNOSIS — F31.60 BIPOLAR AFFECTIVE DISORDER, MIXED: ICD-10-CM

## 2023-09-13 PROCEDURE — H0035 MH PARTIAL HOSP TX UNDER 24H: HCPCS | Performed by: SOCIAL WORKER

## 2023-09-13 NOTE — PROGRESS NOTES
"Adult PHP Group      Date: September 12th, 2023    Time: 12:00 pm - 1:00 pm    Type of Group:  Psychoeducation    Group  Content: Sleep hygiene     Patient Response: Group participants received education on sleep hygiene.  Patient stated that he didn't sleep at all the night before but he felt great.  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  Patient stated that he will usually \"crash\" on his days off and sleep for several hours.  Patient was attentive and engaged as will discussed tips for a better night's sleep.        Alan Anderson  09/13/23  10:24 EDT    "

## 2023-09-13 NOTE — PROGRESS NOTES
"   NAME: Eduardo Cannon  DATE: 09/13/2023    [facility] Phase  NA  [id number]    Services Provided    Group Psychotherapy x 2  Education/Training x 2  Activity Therapy  x 0  Individual Therapy x 0 0 minutes  Family Therapy x 0  MD Initial Visit  x 0  MD Follow-Up   x 0    Number of participants: 4    DATA:  Patient reported he didn't have as much energy as he had had yesterday. He had been awake for nearly three days straight now. He supposed his increase in depression had been related to his lack of sleep. He had only achieved one hour of sleep yesterday.     Regarding his medication, he reported the Risperdal seemed to be helping to tamp down his anger, but he wondered if would also help to have the dosage increased. As an example, he reported having held the door for someone who had not said \"thank you,\" and it didn't even piss him off. However, last night at work he had found himself pondering about what exactly made people, in general, so shitty. He didn't understand how someone's morals could allow them to betray another person's trust.    He shared a video he had edited of someone encouraging people. The person in the video was saying that if you woke up today and had survived another day of addiction and mental illness, then you had done something amazing.     Individual: No    Homework: None assigned    Group 1 (Psychotherapy: Check-ins): Therapist continued facilitation of rapport building strategies between group members. Therapist asked that each patient check in with home life and recovery efforts and identify triggers, cravings, and high risk situations that arise between group sessions.  Group members discussed barriers and benefits of attending recovery meetings.  Therapist provided empathy and support during group session. Daily Reading: None    Group 2  (CBT) Reviewed the cognitive model. Discussed the importance of changing old rules/assumptions, rather than focusing exclusively on automatic " "thoughts. Completed the worksheet, \"Should Statements,\" from Jamir Wilson's Cognitive Therapy Techniques (2017).    Group 3 (Psychotherapy) Processed with the group members their attitudes related to the program lately. Sought feedback and managed expectations. Expressed concern for members who seemed to be experiencing unhelpful attitudes.    Group 4 (Anxiety and Worry) Continued viewing the anxiety and worry video from Monday's group session.     ASSESSMENT:    Personal Assessment 0-10 Scale (10 worst)    Anxiety:  3 (because he hadn't been paid yet)  Depression:  5 (getting tired and only having one hour of sleep last night)  Cravings: 0 (no comment)     MSE within normal limits? Yes Affect: euthymic Mood:  elevated  Pt Response:  open/receptive  Engaged in activity/Process and self-disclosed: adequate  Applies today's group topics to self: adequate  Able to give and receive feedback: adequate  Demonstrated insightful thinking: consistent and adequate  Other pt response comments:  Patient was a positive participant. They demonstrated a relatively positive attitude, a moderate degree of motivation, and adequate insight, as evidenced by his level of engagement combined with his having given tactful feedback to a peer today. They seemed interested and attentive. They appeared to comprehend well the concepts being discussed. The patient seemed receptive of, and willing to implement, the ideas being discussed. Their thought process appeared linear and goal directed, and their speech was regular rate and rhythm.       Community Group Engagement:  No: not yet engaged    Reported relapse since last meeting? No: no lapse reported    .  Office Visit on 09/11/2023   Component Date Value Ref Range Status    External Amphetamine Screen Urine 09/11/2023 Negative   Final    External Benzodiazepine Screen Uri* 09/11/2023 Positive (A)   Final    External Cocaine Screen Urine 09/11/2023 Negative   Final    External THC Screen " Urine 09/11/2023 Negative   Final    External Methadone Screen Urine 09/11/2023 Negative   Final    External Methamphetamine Screen Ur* 09/11/2023 Negative   Final    External Oxycodone Screen Urine 09/11/2023 Negative   Final    External Buprenorphine Screen Urine 09/11/2023 Negative   Final    External MDMA 09/11/2023 Negative   Final    External Opiates Screen Urine 09/11/2023 Negative   Final   Orders Only on 09/07/2023   Component Date Value Ref Range Status    External Amphetamine Screen Urine 09/07/2023 Negative   Final    External Benzodiazepine Screen Uri* 09/07/2023 Positive (A)   Final    External Cocaine Screen Urine 09/07/2023 Negative   Final    External THC Screen Urine 09/07/2023 Negative   Final    External Methadone Screen Urine 09/07/2023 Negative   Final    External Methamphetamine Screen Ur* 09/07/2023 Negative   Final    External Oxycodone Screen Urine 09/07/2023 Negative   Final    External Buprenorphine Screen Urine 09/07/2023 Negative   Final    External MDMA 09/07/2023 Negative   Final    External Opiates Screen Urine 09/07/2023 Negative   Final   Office Visit on 08/30/2023   Component Date Value Ref Range Status    External Amphetamine Screen Urine 08/30/2023 Negative   Final    External Benzodiazepine Screen Uri* 08/30/2023 Negative   Final    External Cocaine Screen Urine 08/30/2023 Negative   Final    External THC Screen Urine 08/30/2023 Negative   Final    External Methadone Screen Urine 08/30/2023 Negative   Final    External Methamphetamine Screen Ur* 08/30/2023 Negative   Final    External Oxycodone Screen Urine 08/30/2023 Negative   Final    External Buprenorphine Screen Urine 08/30/2023 Negative   Final    External MDMA 08/30/2023 Negative   Final    External Opiates Screen Urine 08/30/2023 Negative   Final       Impression/Formulation:    ICD-10-CM ICD-9-CM   1. Alcohol use disorder, severe, dependence  F10.20 303.90   2. Bipolar affective disorder, mixed  F31.60 296.60         CLINICAL MANEUVERING/INTERVENTIONS: Therapist utilized a person-centered approach to build and maintain rapport with group member.   Therapist promoted safe nonjudgmental environment by providing group members with unconditional positive regard.  Assisted member in processing above session content; acknowledged and normalized patient's thoughts, feelings and concerns.  Allowed patient to freely discuss issues without interruption or judgment. Provided safe, confidential environment to facilitate the development of positive therapeutic relationship and encourage open, honest communication. Therapist assisted group member with identifying and implementing healthier coping strategies and maintaining healthier boundaries. Assisted patient in identifying risk factors which would indicate the need for higher level of care including thoughts to harm self or others and/or self-harming behavior and encouraged patient to contact this office, call 911, or present to the nearest emergency room should any of these events occur. Pt agreeable to adhere to medication regimen as prescribed and report any side effects.  Discussed crisis intervention services and means to access.  Patient adamantly and convincingly denies current suicidal or homicidal ideation or perceptual disturbance.      Therapist implemented motivational interviewing techniques to assist client with exploring and resolving ambivalence associated with commitment to change behaviors.  Yes  Therapist utilized dialectical behavior techniques to teach and model emotional regulation and relaxation.  No   Therapist applied cognitive behavioral strategies to facilitate identification of maladaptive patterns of thinking and behavior.  Yes     PLAN:    Continue [facility] PHP Phase  NA  Aftercare:  [facility] Out[facility] Phase 2      Please note that portions of this note were completed with a voice recognition program. Efforts were made to  edit dictation, but occasionally words are mistranscribed.     This document signed by Mulugeta Santos LCSW, September 13, 2023, 15:42 EDT

## 2023-09-14 ENCOUNTER — OFFICE VISIT (OUTPATIENT)
Dept: PSYCHIATRY | Facility: HOSPITAL | Age: 28
End: 2023-09-14
Payer: MEDICARE

## 2023-09-14 DIAGNOSIS — F10.20 ALCOHOL USE DISORDER, SEVERE, DEPENDENCE: Primary | ICD-10-CM

## 2023-09-14 DIAGNOSIS — F31.60 BIPOLAR AFFECTIVE DISORDER, MIXED: ICD-10-CM

## 2023-09-14 PROCEDURE — H0035 MH PARTIAL HOSP TX UNDER 24H: HCPCS | Performed by: SOCIAL WORKER

## 2023-09-14 RX ORDER — NALTREXONE HYDROCHLORIDE 50 MG/1
50 TABLET, FILM COATED ORAL DAILY
Qty: 30 TABLET | Refills: 0 | Status: SHIPPED | OUTPATIENT
Start: 2023-09-14

## 2023-09-14 RX ORDER — DEXTROAMPHETAMINE SACCHARATE, AMPHETAMINE ASPARTATE MONOHYDRATE, DEXTROAMPHETAMINE SULFATE AND AMPHETAMINE SULFATE 2.5; 2.5; 2.5; 2.5 MG/1; MG/1; MG/1; MG/1
10 CAPSULE, EXTENDED RELEASE ORAL EVERY MORNING
COMMUNITY

## 2023-09-14 RX ORDER — RISPERIDONE 1 MG/1
TABLET ORAL
Qty: 14 TABLET | Refills: 0 | Status: SHIPPED | OUTPATIENT
Start: 2023-09-14 | End: 2023-09-14 | Stop reason: SDUPTHER

## 2023-09-14 RX ORDER — RISPERIDONE 1 MG/1
TABLET ORAL
Qty: 14 TABLET | Refills: 0 | Status: SHIPPED | OUTPATIENT
Start: 2023-09-14

## 2023-09-14 NOTE — PROGRESS NOTES
NAME: Eduardo Cannon  DATE: 09/14/2023    [facility] Phase  NA  [phone]    Services Provided    Group Psychotherapy x 2  Education/Training x 2  Activity Therapy  x 0  Individual Therapy x 0 0 minutes  Family Therapy x 0  MD Initial Visit  x 0  MD Follow-Up   x 0    Number of participants: 3    DATA:  Patient reported having achieved five hours of sleep yesterday, and now he was more tired than he had been over the last 48-72 hours. He reported having thought about alcohol use last night while he was at work, but he reported this was a mild craving. He also reported having resisted the urge to murray, although he did report having bought a $10 scratcher. He didn't win on the scratcher, and he was disappointed in himself for having bought it. However, he was pleased to report that he had paid all his bills. What's more, he had purchased $20 of quarters, because he had found it easier to save them compared to other forms of currency.     Individual: No    Homework: None assigned    Group 1 (Psychotherapy: Check-ins): Therapist continued facilitation of rapport building strategies between group members. Therapist asked that each patient check in with home life and recovery efforts and identify triggers, cravings, and high risk situations that arise between group sessions.  Group members discussed barriers and benefits of attending recovery meetings.  Therapist provided empathy and support during group session. Daily Reading: The Truth About Us (Rubio, 2021).    Group 2  (Bibliotherapy) The reading today placed an emphasis on nonjudgmental attitudes, as well as on dangers of pride.     Group 3 (Psychoeducation) This hour of group was facilitated by Alan [facility].    Group 4 (Psychotherapy) This hour of group was facilitated by NEGRITO Gambino.      ASSESSMENT:    Personal Assessment 0-10 Scale (10 worst)    Anxiety:  3 (no comment)  Depression:  1 (no comment)  Cravings: 1 (no comment)     MSE within normal  limits? Yes Affect: somber Mood: calm  Pt Response:  open/receptive  Engaged in activity/Process and self-disclosed: adequate  Applies today's group topics to self: adequate  Able to give and receive feedback: adequate  Demonstrated insightful thinking: occasional and adequate  Other pt response comments:  Patient was a positive participant. They demonstrated a relatively positive attitude, a strong degree of motivation, and adequate insight, as evidenced by his level of engagement combined with his efforts to encourage his peers. They seemed interested and attentive. They appeared to comprehend well the concepts being discussed. The patient seemed receptive of, and willing to implement, the ideas being discussed. Their thought process appeared linear and goal directed, and their speech was regular rate and rhythm.       Community Group Engagement:  No: not engaged    Reported relapse since last meeting? No: no lapse    .  Office Visit on 09/11/2023   Component Date Value Ref Range Status    External Amphetamine Screen Urine 09/11/2023 Negative   Final    External Benzodiazepine Screen Uri* 09/11/2023 Positive (A)   Final    External Cocaine Screen Urine 09/11/2023 Negative   Final    External THC Screen Urine 09/11/2023 Negative   Final    External Methadone Screen Urine 09/11/2023 Negative   Final    External Methamphetamine Screen Ur* 09/11/2023 Negative   Final    External Oxycodone Screen Urine 09/11/2023 Negative   Final    External Buprenorphine Screen Urine 09/11/2023 Negative   Final    External MDMA 09/11/2023 Negative   Final    External Opiates Screen Urine 09/11/2023 Negative   Final   Orders Only on 09/07/2023   Component Date Value Ref Range Status    External Amphetamine Screen Urine 09/07/2023 Negative   Final    External Benzodiazepine Screen Uri* 09/07/2023 Positive (A)   Final    External Cocaine Screen Urine 09/07/2023 Negative   Final    External THC Screen Urine 09/07/2023 Negative   Final     External Methadone Screen Urine 09/07/2023 Negative   Final    External Methamphetamine Screen Ur* 09/07/2023 Negative   Final    External Oxycodone Screen Urine 09/07/2023 Negative   Final    External Buprenorphine Screen Urine 09/07/2023 Negative   Final    External MDMA 09/07/2023 Negative   Final    External Opiates Screen Urine 09/07/2023 Negative   Final   Office Visit on 08/30/2023   Component Date Value Ref Range Status    External Amphetamine Screen Urine 08/30/2023 Negative   Final    External Benzodiazepine Screen Uri* 08/30/2023 Negative   Final    External Cocaine Screen Urine 08/30/2023 Negative   Final    External THC Screen Urine 08/30/2023 Negative   Final    External Methadone Screen Urine 08/30/2023 Negative   Final    External Methamphetamine Screen Ur* 08/30/2023 Negative   Final    External Oxycodone Screen Urine 08/30/2023 Negative   Final    External Buprenorphine Screen Urine 08/30/2023 Negative   Final    External MDMA 08/30/2023 Negative   Final    External Opiates Screen Urine 08/30/2023 Negative   Final       Impression/Formulation:    ICD-10-CM ICD-9-CM   1. Alcohol use disorder, severe, dependence  F10.20 303.90   2. Bipolar affective disorder, mixed  F31.60 296.60        CLINICAL MANEUVERING/INTERVENTIONS: Therapist utilized a person-centered approach to build and maintain rapport with group member.   Therapist promoted safe nonjudgmental environment by providing group members with unconditional positive regard.  Assisted member in processing above session content; acknowledged and normalized patient's thoughts, feelings and concerns.  Allowed patient to freely discuss issues without interruption or judgment. Provided safe, confidential environment to facilitate the development of positive therapeutic relationship and encourage open, honest communication. Therapist assisted group member with identifying and implementing healthier coping strategies and maintaining healthier boundaries.  Assisted patient in identifying risk factors which would indicate the need for higher level of care including thoughts to harm self or others and/or self-harming behavior and encouraged patient to contact this office, call 911, or present to the nearest emergency room should any of these events occur. Pt agreeable to adhere to medication regimen as prescribed and report any side effects.  Discussed crisis intervention services and means to access.  Patient adamantly and convincingly denies current suicidal or homicidal ideation or perceptual disturbance.      Therapist implemented motivational interviewing techniques to assist client with exploring and resolving ambivalence associated with commitment to change behaviors.  Yes  Therapist utilized dialectical behavior techniques to teach and model emotional regulation and relaxation.  No   Therapist applied cognitive behavioral strategies to facilitate identification of maladaptive patterns of thinking and behavior.  Yes     PLAN:    Continue [facility] Phase I  Aftercare:  [facility] Outpatient Phase 2      Please note that portions of this note were completed with a voice recognition program. Efforts were made to edit dictation, but occasionally words are mistranscribed.     This document signed by Mulugeta Santos LCSW, September 14, 2023, 14:43 EDT

## 2023-09-14 NOTE — PROGRESS NOTES
Adult Noon Group      Date: September 14, 2023  Time: 8399-2206    Type of Group:  Psychoeducation    Group  Content: Therapist facilitated group therapy session focused on attachment styles. Group members learned about secure, avoidant, anxious, and anxious-avoidant attachments. Group members identified factors that influence attachment styles.     Patient Response: Patient identifies his attachment style primarily as avoidant. He reports he has a few close relationships but does not seek relationships out. Patient discussed having difficulty with trusting others and reports not liking physical touch.         Nelia Hendrix  09/14/23  16:47 EDT
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
## 2023-09-15 ENCOUNTER — OFFICE VISIT (OUTPATIENT)
Dept: PSYCHIATRY | Facility: HOSPITAL | Age: 28
End: 2023-09-15
Payer: MEDICARE

## 2023-09-15 DIAGNOSIS — F10.20 ALCOHOL USE DISORDER, SEVERE, DEPENDENCE: Primary | ICD-10-CM

## 2023-09-15 DIAGNOSIS — F31.60 BIPOLAR AFFECTIVE DISORDER, MIXED: ICD-10-CM

## 2023-09-15 PROCEDURE — H0035 MH PARTIAL HOSP TX UNDER 24H: HCPCS | Performed by: PSYCHIATRY & NEUROLOGY

## 2023-09-15 NOTE — PROGRESS NOTES
"NAME: Eduardo Cannon  DATE: 09/15/2023    -- CD PHP --     Time:   8642-2267    Services Received This Date:    X 2 Psychotherapy Group   X 2 Education/Training Group   X 0 Individual Psychotherapy   X 0 Activity Therapy Group   X 0 MD Services (Ongoing)   X 0 MD Services (Initial)       DATA:          Psychotherapy Group (Check-in):  Therapist asked that each patient share a summary of how they're doing, including progress towards therapy goals and any new stressors that may have occurred, as well as any items they wish to put on today's agenda. Therapist provided empathy and support.     Education/Training Group: Group members received education about self compassion. Therapist encouraged group members to share their thoughts and discuss this topic with one another. Therapist continued facilitation of group cohesiveness.      Psychotherapy Group: This group focused on self compassion, having a fair attitude toward ourselves, and accepting ourselves while striving for personal growth. Group members completed a scripted letter to themselves that focused on self appreciation. Group members also completed an ad-chuck activity to foster a positive environment and build rapport. Therapist encourages group members to share and respond to one another about their experience with this.     Education/Training Group: Group members received education from [facility].    Personal Assessment 0-10 Scale (10 worst)   Depression: 3  Anxiety: 3    Patient arrived on time and participated in therapy today. Patient shared he is feeling tired today.     ASSESSMENT:    Patient presented with a largely negative, dismissive, and confrontational attitude during session. He describes showing himself self-compassion as \"making myself a good meal and chugging a bottle of liquor.\" Patient reports he shows others compassion by telling them what they shouldn't do, stating a good friend doesn't lie to their friends. Patient reports he did " "show a friend compassion recently who'd experienced a break up, advising him to move on from the relationship and get a new job. While discussing having a fair attitude toward ourselves, patient describes himself as being a bad saba who does good things. He declines treating himself or others with warmth and gentleness. Patient reports that he does not believe people should strive to be a \"good person,\" stating good people aren't capable of doing bad things and men wouldn't fulfill their responsibilities as men without doing so. Patient did not complete activities provided by therapist, referring to exercises as \"kiddy shit.\"     Impression/Formulation:    ICD-10-CM ICD-9-CM   1. Alcohol use disorder, severe, dependence  F10.20 303.90   2. Bipolar affective disorder, mixed  F31.60 296.60         CLINICAL MANUVERING/INTERVENTIONS:  Therapist utilized a person-centered approach to build rapport with patient.  Therapist implemented motivational interviewing techniques to assist patient with exploring personal growth and change.   Therapist applied cognitive behavioral strategies to facilitate identification of maladaptive patterns of thinking and behavior.  Therapist employed group interaction activities to build rapport among group members, promote healthy lifestyle, and emphasize improvement of symptoms.  Therapist promoted safe nonjudgmental environment by providing group members with unconditional positive regard and encouraging group members to comply with group rules and guidelines.  Therapist utilized dialectical behavior techniques to teach and model emotional regulation and relaxation.  Therapist assisted group member with identifying and implementing healthier coping strategies.  Group member was encouraged to make positive daily choices, and maintain healthy boundaries. Group format provides experiential learning of the benefit of sharing openly with others.     [facility]  [facility].  Aftercare:  Step down to IOP level of care     This document signed by Nelia Hendrix, September 15, 2023, 15:35 EDT

## 2023-09-18 ENCOUNTER — OFFICE VISIT (OUTPATIENT)
Dept: PSYCHIATRY | Facility: HOSPITAL | Age: 28
End: 2023-09-18
Payer: MEDICARE

## 2023-09-18 DIAGNOSIS — F31.60 BIPOLAR AFFECTIVE DISORDER, MIXED: ICD-10-CM

## 2023-09-18 DIAGNOSIS — Z79.899 MEDICATION MANAGEMENT: Primary | ICD-10-CM

## 2023-09-18 DIAGNOSIS — F10.20 ALCOHOL USE DISORDER, SEVERE, DEPENDENCE: Primary | ICD-10-CM

## 2023-09-18 LAB
EXTERNAL AMPHETAMINE SCREEN URINE: POSITIVE
EXTERNAL BENZODIAZEPINE SCREEN URINE: POSITIVE
EXTERNAL BUPRENORPHINE SCREEN URINE: NEGATIVE
EXTERNAL COCAINE SCREEN URINE: NEGATIVE
EXTERNAL MDMA: NEGATIVE
EXTERNAL METHADONE SCREEN URINE: NEGATIVE
EXTERNAL METHAMPHETAMINE SCREEN URINE: NEGATIVE
EXTERNAL OPIATES SCREEN URINE: NEGATIVE
EXTERNAL OXYCODONE SCREEN URINE: NEGATIVE
EXTERNAL THC SCREEN URINE: NEGATIVE

## 2023-09-18 PROCEDURE — H0035 MH PARTIAL HOSP TX UNDER 24H: HCPCS | Performed by: SOCIAL WORKER

## 2023-09-18 NOTE — PROGRESS NOTES
"   NAME: Eduardo Cannon  DATE: 09/18/2023    [facility] Phase  NA  [id number]    Services Provided    Group Psychotherapy x 2  Education/Training x 2  Activity Therapy  x 0  Individual Therapy x 0 0 minutes  Family Therapy x 0  MD Initial Visit  x 0  MD Follow-Up   x 0    Number of participants: 4    DATA:  Patient reported he was doing pretty good he guessed. His weekend was uneventful. He worked Saturday. He drank a twisted tea and a shot of fireball on Sunday. One of his fellow group members helped him minimize his alcohol consumption. The patient reported the remainder of the whiskey was still in his house, and he would probably pour it out tonight.    He reported that Friday's group was difficult because the female therapist had a hard time accepting the truth. He didn't feel like the content of the group was pertinent to him. It was not appealing to him. He reported having referred to the group content as \"kiddy bull shit.\" He had read the notes, and that was how he had discovered his words were documented. This was not as bothersome to him as was the wasting of time. He reported that his attitude was most likely irritable that day because of how tired he was from having just gotten off work.     After having read his notes, he had also taken issue with the statement, \"only seems motivated by court order.\" He explained that he was also motivated to come to [facility] because he enjoyed the group content. He did, however, not feel the same about the PHP hours. He hoped to not have to attend Fridays.     Individual: No    Homework: None assigned    Group 1 (Psychotherapy: Check-ins): Therapist continued facilitation of rapport building strategies between group members. Therapist asked that each patient check in with home life and recovery efforts and identify triggers, cravings, and high risk situations that arise between group sessions.  Group members discussed barriers and benefits of attending recovery meetings.  " Therapist provided empathy and support during group session. Daily Reading: First and Second Things (JARVIS Beltran).    Group 2  (Values) Completed a values assessment exercise. Discussed the concept of cognitive dissonance.     Group 3 (Values) Continued discussion of values, emphasizing the fact that there are no risk-free choices.    Group 4 (Mental Health) Viewed and discussed three interview excerpts from the video, Mastering The Mind: A Mental Health Deep Dive (2021) by Mo Foster.      ASSESSMENT:    Personal Assessment 0-10 Scale (10 worst)    Anxiety:  5 (no comment)  Depression:  3 (no comment)  Cravings: 1 (no comment)     MSE within normal limits? Yes Affect: somber Mood: calm  Pt Response:  ambivalent  Engaged in activity/Process and self-disclosed: adequate  Applies today's group topics to self: adequate  Able to give and receive feedback: adequate  Demonstrated insightful thinking: inconsistent and suboptimal  Other pt response comments:  Patient was a positive participant. They demonstrated a relatively optimistic attitude, ambivalence, and moderate insight, as evidenced by his level of engagement combined with his having minimized his alcohol consumption. They seemed interested and attentive. They appeared to comprehend well the concepts being discussed. The patient seemed receptive of, and willing to implement, the ideas being discussed. Their thought process appeared linear and goal directed, and their speech was regular rate and rhythm.       Community Group Engagement:  No: not engaged    Reported relapse since last meeting? Yes: alcohol on Sunday    .  Orders Only on 09/18/2023   Component Date Value Ref Range Status    External Amphetamine Screen Urine 09/18/2023 Positive (A)   Final    External Benzodiazepine Screen Uri* 09/18/2023 Positive (A)   Final    External Cocaine Screen Urine 09/18/2023 Negative   Final    External THC Screen Urine 09/18/2023 Negative   Final    External Methadone Screen  Urine 09/18/2023 Negative   Final    External Methamphetamine Screen Ur* 09/18/2023 Negative   Final    External Oxycodone Screen Urine 09/18/2023 Negative   Final    External Buprenorphine Screen Urine 09/18/2023 Negative   Final    External MDMA 09/18/2023 Negative   Final    External Opiates Screen Urine 09/18/2023 Negative   Final   Office Visit on 09/11/2023   Component Date Value Ref Range Status    External Amphetamine Screen Urine 09/11/2023 Negative   Final    External Benzodiazepine Screen Uri* 09/11/2023 Positive (A)   Final    External Cocaine Screen Urine 09/11/2023 Negative   Final    External THC Screen Urine 09/11/2023 Negative   Final    External Methadone Screen Urine 09/11/2023 Negative   Final    External Methamphetamine Screen Ur* 09/11/2023 Negative   Final    External Oxycodone Screen Urine 09/11/2023 Negative   Final    External Buprenorphine Screen Urine 09/11/2023 Negative   Final    External MDMA 09/11/2023 Negative   Final    External Opiates Screen Urine 09/11/2023 Negative   Final   Orders Only on 09/07/2023   Component Date Value Ref Range Status    External Amphetamine Screen Urine 09/07/2023 Negative   Final    External Benzodiazepine Screen Uri* 09/07/2023 Positive (A)   Final    External Cocaine Screen Urine 09/07/2023 Negative   Final    External THC Screen Urine 09/07/2023 Negative   Final    External Methadone Screen Urine 09/07/2023 Negative   Final    External Methamphetamine Screen Ur* 09/07/2023 Negative   Final    External Oxycodone Screen Urine 09/07/2023 Negative   Final    External Buprenorphine Screen Urine 09/07/2023 Negative   Final    External MDMA 09/07/2023 Negative   Final    External Opiates Screen Urine 09/07/2023 Negative   Final   Office Visit on 08/30/2023   Component Date Value Ref Range Status    External Amphetamine Screen Urine 08/30/2023 Negative   Final    External Benzodiazepine Screen Uri* 08/30/2023 Negative   Final    External Cocaine Screen Urine  08/30/2023 Negative   Final    External THC Screen Urine 08/30/2023 Negative   Final    External Methadone Screen Urine 08/30/2023 Negative   Final    External Methamphetamine Screen Ur* 08/30/2023 Negative   Final    External Oxycodone Screen Urine 08/30/2023 Negative   Final    External Buprenorphine Screen Urine 08/30/2023 Negative   Final    External MDMA 08/30/2023 Negative   Final    External Opiates Screen Urine 08/30/2023 Negative   Final       Impression/Formulation:    ICD-10-CM ICD-9-CM   1. Alcohol use disorder, severe, dependence  F10.20 303.90   2. Bipolar affective disorder, mixed  F31.60 296.60        CLINICAL MANEUVERING/INTERVENTIONS: Therapist utilized a person-centered approach to build and maintain rapport with group member.   Therapist promoted safe nonjudgmental environment by providing group members with unconditional positive regard.  Assisted member in processing above session content; acknowledged and normalized patient's thoughts, feelings and concerns.  Allowed patient to freely discuss issues without interruption or judgment. Provided safe, confidential environment to facilitate the development of positive therapeutic relationship and encourage open, honest communication. Therapist assisted group member with identifying and implementing healthier coping strategies and maintaining healthier boundaries. Assisted patient in identifying risk factors which would indicate the need for higher level of care including thoughts to harm self or others and/or self-harming behavior and encouraged patient to contact this office, call 911, or present to the nearest emergency room should any of these events occur. Pt agreeable to adhere to medication regimen as prescribed and report any side effects.  Discussed crisis intervention services and means to access.  Patient adamantly and convincingly denies current suicidal or homicidal ideation or perceptual disturbance.      Therapist implemented  motivational interviewing techniques to assist client with exploring and resolving ambivalence associated with commitment to change behaviors.  Yes  Therapist utilized dialectical behavior techniques to teach and model emotional regulation and relaxation.  No   Therapist applied cognitive behavioral strategies to facilitate identification of maladaptive patterns of thinking and behavior.  Yes     PLAN:    [facility]  NA  Aftercare:  [facility]  Out[facility] Phase 2      Please note that portions of this note were completed with a voice recognition program. Efforts were made to edit dictation, but occasionally words are mistranscribed.     This document signed by Mulugeta Santos LCSW, September 18, 2023, 14:52 EDT

## 2023-09-19 ENCOUNTER — OFFICE VISIT (OUTPATIENT)
Dept: PSYCHIATRY | Facility: HOSPITAL | Age: 28
End: 2023-09-19
Payer: MEDICARE

## 2023-09-19 DIAGNOSIS — F31.60 BIPOLAR AFFECTIVE DISORDER, MIXED: ICD-10-CM

## 2023-09-19 DIAGNOSIS — F10.20 ALCOHOL USE DISORDER, SEVERE, DEPENDENCE: Primary | ICD-10-CM

## 2023-09-19 PROCEDURE — H0035 MH PARTIAL HOSP TX UNDER 24H: HCPCS | Performed by: SOCIAL WORKER

## 2023-09-19 NOTE — PROGRESS NOTES
NAME: Eduardo Cannon  DATE: 09/19/2023    [facility] Phase  na  [id number]    Services Provided    Group Psychotherapy x 2  Education/Training x 2  Activity Therapy  x 0  Individual Therapy x 0 0 minutes  Family Therapy x 0  MD Initial Visit  x 0  MD Follow-Up   x 0    Number of participants: 3    DATA:  Patient reported having achieved a good night's sleep. He hadn't worked last night. He would, however, be working tonight. Otherwise, he was feeling good. Regarding the whiskey he had at home, he reported having poured it out successfully when he got home. In regard to his hearing difficulties, the doctor had not been able to help because had developed a blood blister in his ear. He now had to wait five days until he could go back.     He hoped to be paid this weekend. Therapist asked how he planned to resist the urge to murray. He reported that gambling was a larger problem when he was short on funds.     Individual: No    Homework: None assigned    Group 1 (Psychotherapy: Check-ins): Therapist continued facilitation of rapport building strategies between group members. Therapist asked that each patient check in with home life and recovery efforts and identify triggers, cravings, and high risk situations that arise between group sessions.  Group members discussed barriers and benefits of attending recovery meetings.  Therapist provided empathy and support during group session. Daily Reading: None    Group 2  (12 Steps) Discussed the concepts of hope and insanity from Step 2, utilizing the  Step Work Guide.     Group 3 (Recovery Stories) Viewed and discussed the first 15 minutes of the video, Nino Eduardo Speaks Candidly about Addiction and Recovery (2017) with Nino Eduardo on YouTube.     Group 4 (Psychotherapy) This hour of group was facilitated by NEGRITO Gambino.      ASSESSMENT:    Personal Assessment 0-10 Scale (10 worst)    Anxiety:  2 (no comment)  Depression:  2 (no comment)  Cravings: 1 (no comment)      MSE within normal limits? Yes Affect: somber Mood: depressed  Pt Response:  open/receptive  Engaged in activity/Process and self-disclosed: adequate  Applies today's group topics to self: adequate  Able to give and receive feedback: adequate  Demonstrated insightful thinking: inconsistent and suboptimal  Other pt response comments:  Patient was a positive participant. They demonstrated a relatively positive attitude, a moderate degree of motivation, and moderate insight, as evidenced by his level of engagement combined with the recency of his alcohol use. They seemed interested and attentive. They appeared to comprehend well the concepts being discussed. The patient seemed receptive of, and willing to implement, the ideas being discussed. Their thought process appeared linear and goal directed, and their speech was regular rate and rhythm.       Community Group Engagement:  No: not engaged    Reported relapse since last meeting? No: lapse this past weekend    .  Orders Only on 09/18/2023   Component Date Value Ref Range Status    External Amphetamine Screen Urine 09/18/2023 Positive (A)   Final    External Benzodiazepine Screen Uri* 09/18/2023 Positive (A)   Final    External Cocaine Screen Urine 09/18/2023 Negative   Final    External THC Screen Urine 09/18/2023 Negative   Final    External Methadone Screen Urine 09/18/2023 Negative   Final    External Methamphetamine Screen Ur* 09/18/2023 Negative   Final    External Oxycodone Screen Urine 09/18/2023 Negative   Final    External Buprenorphine Screen Urine 09/18/2023 Negative   Final    External MDMA 09/18/2023 Negative   Final    External Opiates Screen Urine 09/18/2023 Negative   Final   Office Visit on 09/11/2023   Component Date Value Ref Range Status    External Amphetamine Screen Urine 09/11/2023 Negative   Final    External Benzodiazepine Screen Uri* 09/11/2023 Positive (A)   Final    External Cocaine Screen Urine 09/11/2023 Negative   Final    External  THC Screen Urine 09/11/2023 Negative   Final    External Methadone Screen Urine 09/11/2023 Negative   Final    External Methamphetamine Screen Ur* 09/11/2023 Negative   Final    External Oxycodone Screen Urine 09/11/2023 Negative   Final    External Buprenorphine Screen Urine 09/11/2023 Negative   Final    External MDMA 09/11/2023 Negative   Final    External Opiates Screen Urine 09/11/2023 Negative   Final   Orders Only on 09/07/2023   Component Date Value Ref Range Status    External Amphetamine Screen Urine 09/07/2023 Negative   Final    External Benzodiazepine Screen Uri* 09/07/2023 Positive (A)   Final    External Cocaine Screen Urine 09/07/2023 Negative   Final    External THC Screen Urine 09/07/2023 Negative   Final    External Methadone Screen Urine 09/07/2023 Negative   Final    External Methamphetamine Screen Ur* 09/07/2023 Negative   Final    External Oxycodone Screen Urine 09/07/2023 Negative   Final    External Buprenorphine Screen Urine 09/07/2023 Negative   Final    External MDMA 09/07/2023 Negative   Final    External Opiates Screen Urine 09/07/2023 Negative   Final   Office Visit on 08/30/2023   Component Date Value Ref Range Status    External Amphetamine Screen Urine 08/30/2023 Negative   Final    External Benzodiazepine Screen Uri* 08/30/2023 Negative   Final    External Cocaine Screen Urine 08/30/2023 Negative   Final    External THC Screen Urine 08/30/2023 Negative   Final    External Methadone Screen Urine 08/30/2023 Negative   Final    External Methamphetamine Screen Ur* 08/30/2023 Negative   Final    External Oxycodone Screen Urine 08/30/2023 Negative   Final    External Buprenorphine Screen Urine 08/30/2023 Negative   Final    External MDMA 08/30/2023 Negative   Final    External Opiates Screen Urine 08/30/2023 Negative   Final       Impression/Formulation:    ICD-10-CM ICD-9-CM   1. Alcohol use disorder, severe, dependence  F10.20 303.90   2. Bipolar affective disorder, mixed  F31.60  296.60        CLINICAL MANEUVERING/INTERVENTIONS: Therapist utilized a person-centered approach to build and maintain rapport with group member.   Therapist promoted safe nonjudgmental environment by providing group members with unconditional positive regard.  Assisted member in processing above session content; acknowledged and normalized patient's thoughts, feelings and concerns.  Allowed patient to freely discuss issues without interruption or judgment. Provided safe, confidential environment to facilitate the development of positive therapeutic relationship and encourage open, honest communication. Therapist assisted group member with identifying and implementing healthier coping strategies and maintaining healthier boundaries. Assisted patient in identifying risk factors which would indicate the need for higher level of care including thoughts to harm self or others and/or self-harming behavior and encouraged patient to contact this office, call 911, or present to the nearest emergency room should any of these events occur. Pt agreeable to adhere to medication regimen as prescribed and report any side effects.  Discussed crisis intervention services and means to access.  Patient adamantly and convincingly denies current suicidal or homicidal ideation or perceptual disturbance.      Therapist implemented motivational interviewing techniques to assist client with exploring and resolving ambivalence associated with commitment to change behaviors.  Yes  Therapist utilized dialectical behavior techniques to teach and model emotional regulation and relaxation.  No   Therapist applied cognitive behavioral strategies to facilitate identification of maladaptive patterns of thinking and behavior.  Yes     PLAN:    Continue [facility] PHP Phase  na  Aftercare:  [facility] Outpatient Phase 2     Please note that portions of this note were completed with a voice recognition program. Efforts were  made to edit dictation, but occasionally words are mistranscribed.     This document signed by Mulugeta Santos LCSW, September 19, 2023, 15:23 EDT

## 2023-09-19 NOTE — PROGRESS NOTES
"Adult PHP Noon Group      Date: September 19, 2023  Time: [id number]    Type of Group:  Psychoeducation    Group  Content: Today's group focused on mentalizing. Group members watched \"What is Mentalizing & Why Do It?\" By Van Hathaway, PhD. Group members discussed the definition of mentalizing and learned the differences between automatic vs controlled mentalizing and external vs internal mentalizing. Group members learned about hypermentalizing. Group members discussed benefits of mentalizing and causes of misunderstandings in relationships.     Patient Response: Patient was cooperative with group and receptive to topic. Patient reports he typically goes a good job at identifying his thoughts and feelings unless he is experiencing paranoia. He reports he tends to rely on automatic mentalizing with people he is not close with because he is not motivated to engage in controlled mentalizing. Patient reports he does have an external focus and feels this is a protective factor. Patient reports he feels it is beneficial to be aware of people around him. Patient reports he does struggle with jumping to conclusions at times.         Nelia Hendrix  09/19/23  17:28 EDT    "

## 2023-09-20 ENCOUNTER — OFFICE VISIT (OUTPATIENT)
Dept: PSYCHIATRY | Facility: HOSPITAL | Age: 28
End: 2023-09-20
Payer: MEDICARE

## 2023-09-20 DIAGNOSIS — F10.20 ALCOHOL USE DISORDER, SEVERE, DEPENDENCE: Primary | ICD-10-CM

## 2023-09-20 DIAGNOSIS — F31.60 BIPOLAR AFFECTIVE DISORDER, MIXED: ICD-10-CM

## 2023-09-20 PROCEDURE — H0035 MH PARTIAL HOSP TX UNDER 24H: HCPCS | Performed by: SOCIAL WORKER
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
## 2023-09-21 ENCOUNTER — OFFICE VISIT (OUTPATIENT)
Dept: PSYCHIATRY | Facility: HOSPITAL | Age: 28
End: 2023-09-21
Payer: MEDICARE

## 2023-09-21 ENCOUNTER — LAB (OUTPATIENT)
Dept: LAB | Facility: HOSPITAL | Age: 28
End: 2023-09-21

## 2023-09-21 DIAGNOSIS — F10.20 ALCOHOL USE DISORDER, SEVERE, DEPENDENCE: Primary | ICD-10-CM

## 2023-09-21 DIAGNOSIS — F10.20 ALCOHOL USE DISORDER, SEVERE, DEPENDENCE: ICD-10-CM

## 2023-09-21 DIAGNOSIS — Z79.899 MEDICATION MANAGEMENT: Primary | ICD-10-CM

## 2023-09-21 DIAGNOSIS — F31.60 BIPOLAR AFFECTIVE DISORDER, MIXED: ICD-10-CM

## 2023-09-21 LAB
ALBUMIN SERPL-MCNC: 5.2 G/DL (ref 3.5–5.2)
ALBUMIN/GLOB SERPL: 2 G/DL
ALP SERPL-CCNC: 113 U/L (ref 39–117)
ALT SERPL W P-5'-P-CCNC: 138 U/L (ref 1–41)
ANION GAP SERPL CALCULATED.3IONS-SCNC: 12.1 MMOL/L (ref 5–15)
AST SERPL-CCNC: 53 U/L (ref 1–40)
BILIRUB SERPL-MCNC: 0.5 MG/DL (ref 0–1.2)
BUN SERPL-MCNC: 11 MG/DL (ref 6–20)
BUN/CREAT SERPL: 12.9 (ref 7–25)
CALCIUM SPEC-SCNC: 9.9 MG/DL (ref 8.6–10.5)
CHLORIDE SERPL-SCNC: 105 MMOL/L (ref 98–107)
CO2 SERPL-SCNC: 22.9 MMOL/L (ref 22–29)
CREAT SERPL-MCNC: 0.85 MG/DL (ref 0.76–1.27)
EGFRCR SERPLBLD CKD-EPI 2021: 122.9 ML/MIN/1.73
ETHANOL BLD-MCNC: <10 MG/DL (ref 0–10)
ETHANOL UR QL: <0.01 %
EXTERNAL AMPHETAMINE SCREEN URINE: POSITIVE
EXTERNAL BENZODIAZEPINE SCREEN URINE: POSITIVE
EXTERNAL BUPRENORPHINE SCREEN URINE: NEGATIVE
EXTERNAL COCAINE SCREEN URINE: NEGATIVE
EXTERNAL MDMA: NEGATIVE
EXTERNAL METHADONE SCREEN URINE: NEGATIVE
EXTERNAL METHAMPHETAMINE SCREEN URINE: NEGATIVE
EXTERNAL OPIATES SCREEN URINE: NEGATIVE
EXTERNAL OXYCODONE SCREEN URINE: NEGATIVE
EXTERNAL THC SCREEN URINE: NEGATIVE
GLOBULIN UR ELPH-MCNC: 2.6 GM/DL
GLUCOSE SERPL-MCNC: 109 MG/DL (ref 65–99)
POTASSIUM SERPL-SCNC: 4.4 MMOL/L (ref 3.5–5.2)
PROT SERPL-MCNC: 7.8 G/DL (ref 6–8.5)
SODIUM SERPL-SCNC: 140 MMOL/L (ref 136–145)

## 2023-09-21 PROCEDURE — 80053 COMPREHEN METABOLIC PANEL: CPT

## 2023-09-21 PROCEDURE — H0035 MH PARTIAL HOSP TX UNDER 24H: HCPCS | Performed by: SOCIAL WORKER

## 2023-09-21 PROCEDURE — 1159F MED LIST DOCD IN RCRD: CPT | Performed by: PSYCHIATRY & NEUROLOGY

## 2023-09-21 PROCEDURE — 1160F RVW MEDS BY RX/DR IN RCRD: CPT | Performed by: PSYCHIATRY & NEUROLOGY

## 2023-09-21 PROCEDURE — 36415 COLL VENOUS BLD VENIPUNCTURE: CPT

## 2023-09-21 PROCEDURE — 82077 ASSAY SPEC XCP UR&BREATH IA: CPT

## 2023-09-21 NOTE — PROGRESS NOTES
Adult Noon Group      Date: September 21, 2023  Time: 3124-4811    Type of Group:  Psychotherapy    Group  Content: Therapist facilitated group therapy session focused on building discrepancy. Group members identified a maladaptive coping skill or behavior they engage in and discussed how their life would change if they continued or stopped the behavior.    Patient Response: Patient was cooperative with group. He was engaged in activity and completed assignment. Patient identified his maladaptive coping skill as drinking alcohol. He shared his answers regarding differences continuing or stopping using would have on his long-term goals. Patient presents with insight.         Nelia Hendrix  09/21/23  13:13 EDT

## 2023-09-21 NOTE — PROGRESS NOTES
[facility]  [address] [place], [place] [id number]    Intensive Outpatient Program for Chemical Dependence (CD IOP)      Treatment Plan Reassessment (biweekly)       Long Term Goal:  Eduardo will establish a sustained recovery and will maintain total abstinence from mind-altering substances other than what is prescribed and/or approved by the attending physician. Pt will learn, practice, and utilize behavioral and cognitive coping skills to help maintain sobriety.    Patient Care Needs: Eduardo presents with alcohol use disorder and is at high risk for relapse without continued treatment.  Pt has a history of using drugs/alcohol until intoxicated or passed out and has been unable to stop or cut down use of alcohol/drugs once starting, despite verbalized desire to do so, and the negative consequences from continued use.  Pt's use has negatively impacted mental, social, occupational, and/or physical functioning.      Program Goal:    1.  Patient will participate in a medical evaluation to assess the effects of chemical dependence and will cooperate with an evaluation by the attending psychiatrist or psychiatric nurse practitioner for psychotropic medication if appropriate.      2.  Patient will adhere to the IOP group rules.        3.  Patient will attend group sessions as scheduled. Patient will notify therapist and support staff of any absences or tardies. Patient will provide a valid and verifiable excuse for absences to be considered excused.    4.  Patient will participate in random drug and alcohol screenings and will exhibit negative results on said screenings.    5.  Patient will identify high risk situations, people, and places to avoid or manage in order to maintain sobriety.    6.  Patient will participate in group discussions by giving and receiving feedback appropriately.      7.  Patient will develop a positive network of support by attending a minimum of two  recovery/spiritual support groups each week (two outside of IOP group) and by exploring, obtaining or maintaining a sponsor or sober support person.      8.  Patient will identify, practice, and implement at least 5 healthy coping strategies to manage difficult emotions while remaining abstinent.    9.   Patient will develop relapse prevention skills and be able to identify and share them with the group in the form of a relapse prevention plan.    10.  Patient will write a personal recovery plan and share it with the group prior to discharge.    11.  Patient will offer family participation in family education groups, if appropriate.    12.  Patient will exhibit increased motivation to change as assessed by his/her cooperation and behavior and the ASAM assessment tool.    13.  Patient will utilize healthy coping skills to manage depressive and anxious symptoms without using alcohol or other mind-altering substances and will exhibit decreased score on the PHQ-9 and KADEEM-7.   Goal Progress:    1. Patient participated in an evaluation for medication management with Padilla Upton MD. Patient is meeting with medical provider as scheduled for medication management and follow-up.          2.  Patient read and signed IOP Group Rules. Patient is adhering to group rules.      3.  Patient has 0 unexcused absences.                  4.  Patient has displayed positive results on drug and alcohol screenings thus far, for benzodiazepines and meth.        5.  Patient has encountered the following high-risk situations since beginning treatment: No high risk situations with alcohol. 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     6.  Patient provides and receives feedback daily. When not actively participating, patient is attentive and appropriate.    7.  Patient is not attending recovery/spiritual support group meetings at least 1 times per week. Patient does not have a  sponsor/sober support person and is not working the 12 steps.    8.  Patient has identified and implemented the following coping strategies during high risk situations: He had been dieting a little better. He was also playing the tape to the end, especially when it came to gambling.         9.  Patient has identified high-risk people, places, and things as well as healthy coping strategies for avoiding relapse.       10.  Patient has not yet developed a personal recovery plan.      11.  Declines family participation.          12.  Patient reports mild cravings.  Patient's behavior indicates ongoing motivation for sobriety.  ASAM indicates     ASAM Dimensions:  I.    Intoxication/Withdrawal:  1  (continued drug and alcohol use)  II.   Medical Conditions/Complications:  0 (no known conditions)  III.  Behavioral/Emotional/Cognitive: 2 (episodic kita)  IV.  Readiness to Change: 2 (continued alcohol and drug use)  V.   Relapse Risk: 1 (continued use)  VI:  Recovery Environment: 1 (isolation)  Total ASAM Score = 7       13. Pt is managing anxiety and or depression relatively well without using mind-altering substances.  Pt PHQ-9 and KADEEM-7 scores indicate 18 and 15, as of Sept. 7th.     Ongoing Goal:    1.  Partially completed.  Patient participated initial evaluation for medication management with Padilla Upton MD.  Patient will continue participating in as scheduled medication management and follow-up appointments with psychiatric medical provider.    2.  Patient is making progress toward goal and will continue working toward objective.     3.  Patient will continue working toward objective.                   4.  Patient will continue working toward objective.          5.  Partially completed. Patient will continue working toward objective.      6.  Partially completed. Patient will continue working toward objective.        7.  Patient is not making progress toward goal.                   8.  Partially completed.  Patient will continue working toward objective.            9.  Partially completed. Patient will continue working toward objective.           10.  Partially completed. Patient will continue working toward objective.       11.  Patient will continue to encourage family participation.      12.  Partially completed.  Patient will continue implementing behavioral changes to promote long-term sobriety and recovery.    13. Partially completed.  Anxiety and depression will continue to be monitored.        Next Target Date: October 5th    Team Members:  Patient - Eduardo Cannon  Medical Provider - Padilla Upton MD   [facility] Licensed Therapist - Mulugeta Santos LCSW  [facility] Director - Tucker Valero LCSW, [facility]  Support Staff

## 2023-09-21 NOTE — PROGRESS NOTES
NAME: Eduardo Cannon  DATE: 09/21/2023    CD PHP Phase  na  [phone]    Services Provided    Group Psychotherapy x 2  Education/Training x 2  Activity Therapy  x 0  Individual Therapy x 1 30 minutes  Family Therapy x 0  MD Initial Visit  x 0  MD Follow-Up   x 1    Number of participants: 4    DATA:  Patient reported he was doing pretty good. 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 She stayed up talking with him last night while he was working. They talked about government conspiracies, life, god, and everything. He was also pleased to report that he had been paid and had not gambled. 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 He resisted the urge by playing the tape to the end, reminding himself that gambling rarely, if ever, profited him anything. He usually ended up losing all his money, even if he won a few times.     Individual: Yes, describe: Updated treatment plan.    Homework: None assigned    Group 1 (Psychotherapy: Check-ins): Therapist continued facilitation of rapport building strategies between group members. Therapist asked that each patient check in with home life and recovery efforts and identify triggers, cravings, and high risk situations that arise between group sessions.  Group members discussed barriers and benefits of attending recovery meetings.  Therapist provided empathy and support during group session. Daily Reading: None    Group 2  (Existential Therapy) Viewed the video, Batman and Identity (2016) by [employer] on [employer]. Guided discussion about the importance of a new identity in recovery.     Group 3 (Psychotherapy) Conducted individual sessions while other participants completed physician visits and lab work.     Group 4 (Psychotherapy) This hour of group was facilitated by NEGRITO Gambino.       ASSESSMENT:    Personal Assessment 0-10 Scale (10 worst)    Anxiety:  3 (no comment)  Depression:  3 (boredom 8)  Cravings: 0 (no comment)     MSE within normal limits? Yes Affect: somber Mood: calm  Pt Response:  open/receptive  Engaged in activity/Process and self-disclosed: adequate  Applies today's group topics to self: adequate  Able to give and receive feedback: adequate  Demonstrated insightful thinking: consistent and adequate  Other pt response comments:  Patient was a positive participant. They demonstrated a relatively optimistic attitude, a moderate degree of motivation, and adequate insight, as evidenced by his level of engagement combined with his having resisted the urge to murray yesterday. They seemed interested and attentive. They appeared to comprehend well the concepts being discussed. The patient seemed receptive of, and willing to implement, the ideas being discussed. Their thought process appeared linear and goal directed, and their speech was regular rate and rhythm.       Community Group Engagement:  No: not engaged    Reported relapse since last meeting? No: no lapse reported    .  Orders Only on 09/21/2023   Component Date Value Ref Range Status    External Amphetamine Screen Urine 09/21/2023 Positive (A)   Final    External Benzodiazepine Screen Uri* 09/21/2023 Positive (A)   Final    External Cocaine Screen Urine 09/21/2023 Negative   Final    External THC Screen Urine 09/21/2023 Negative   Final    External Methadone Screen Urine 09/21/2023 Negative   Final    External Methamphetamine Screen Ur* 09/21/2023 Negative   Final    External Oxycodone Screen Urine 09/21/2023 Negative   Final    External Buprenorphine Screen Urine 09/21/2023 Negative   Final    External MDMA 09/21/2023 Negative   Final    External Opiates Screen Urine 09/21/2023 Negative   Final   Lab on 09/21/2023   Component Date Value Ref Range Status    Ethanol 09/21/2023 <10  0 - 10 mg/dL Final    Ethanol % 09/21/2023  <0.010  % Final    Glucose 09/21/2023 109 (H)  65 - 99 mg/dL Final    BUN 09/21/2023 11  6 - 20 mg/dL Final    Creatinine 09/21/2023 0.85  0.76 - 1.27 mg/dL Final    Sodium 09/21/2023 140  136 - 145 mmol/L Final    Potassium 09/21/2023 4.4  3.5 - 5.2 mmol/L Final    Chloride 09/21/2023 105  98 - 107 mmol/L Final    CO2 09/21/2023 22.9  22.0 - 29.0 mmol/L Final    Calcium 09/21/2023 9.9  8.6 - 10.5 mg/dL Final    Total Protein 09/21/2023 7.8  6.0 - 8.5 g/dL Final    Albumin 09/21/2023 5.2  3.5 - 5.2 g/dL Final    ALT (SGPT) 09/21/2023 138 (H)  1 - 41 U/L Final    AST (SGOT) 09/21/2023 53 (H)  1 - 40 U/L Final    Alkaline Phosphatase 09/21/2023 113  39 - 117 U/L Final    Total Bilirubin 09/21/2023 0.5  0.0 - 1.2 mg/dL Final    Globulin 09/21/2023 2.6  gm/dL Final    A/G Ratio 09/21/2023 2.0  g/dL Final    BUN/Creatinine Ratio 09/21/2023 12.9  7.0 - 25.0 Final    Anion Gap 09/21/2023 12.1  5.0 - 15.0 mmol/L Final    eGFR 09/21/2023 122.9  >60.0 mL/min/1.73 Final   Orders Only on 09/18/2023   Component Date Value Ref Range Status    External Amphetamine Screen Urine 09/18/2023 Positive (A)   Final    External Benzodiazepine Screen Uri* 09/18/2023 Positive (A)   Final    External Cocaine Screen Urine 09/18/2023 Negative   Final    External THC Screen Urine 09/18/2023 Negative   Final    External Methadone Screen Urine 09/18/2023 Negative   Final    External Methamphetamine Screen Ur* 09/18/2023 Negative   Final    External Oxycodone Screen Urine 09/18/2023 Negative   Final    External Buprenorphine Screen Urine 09/18/2023 Negative   Final    External MDMA 09/18/2023 Negative   Final    External Opiates Screen Urine 09/18/2023 Negative   Final   Office Visit on 09/11/2023   Component Date Value Ref Range Status    External Amphetamine Screen Urine 09/11/2023 Negative   Final    External Benzodiazepine Screen Uri* 09/11/2023 Positive (A)   Final    External Cocaine Screen Urine 09/11/2023 Negative   Final    External THC  Screen Urine 09/11/2023 Negative   Final    External Methadone Screen Urine 09/11/2023 Negative   Final    External Methamphetamine Screen Ur* 09/11/2023 Negative   Final    External Oxycodone Screen Urine 09/11/2023 Negative   Final    External Buprenorphine Screen Urine 09/11/2023 Negative   Final    External MDMA 09/11/2023 Negative   Final    External Opiates Screen Urine 09/11/2023 Negative   Final   Orders Only on 09/07/2023   Component Date Value Ref Range Status    External Amphetamine Screen Urine 09/07/2023 Negative   Final    External Benzodiazepine Screen Uri* 09/07/2023 Positive (A)   Final    External Cocaine Screen Urine 09/07/2023 Negative   Final    External THC Screen Urine 09/07/2023 Negative   Final    External Methadone Screen Urine 09/07/2023 Negative   Final    External Methamphetamine Screen Ur* 09/07/2023 Negative   Final    External Oxycodone Screen Urine 09/07/2023 Negative   Final    External Buprenorphine Screen Urine 09/07/2023 Negative   Final    External MDMA 09/07/2023 Negative   Final    External Opiates Screen Urine 09/07/2023 Negative   Final       Impression/Formulation:    ICD-10-CM ICD-9-CM   1. Alcohol use disorder, severe, dependence  F10.20 303.90   2. Bipolar affective disorder, mixed  F31.60 296.60        CLINICAL MANEUVERING/INTERVENTIONS: Therapist utilized a person-centered approach to build and maintain rapport with group member.   Therapist promoted safe nonjudgmental environment by providing group members with unconditional positive regard.  Assisted member in processing above session content; acknowledged and normalized patient's thoughts, feelings and concerns.  Allowed patient to freely discuss issues without interruption or judgment. Provided safe, confidential environment to facilitate the development of positive therapeutic relationship and encourage open, honest communication. Therapist assisted group member with identifying and implementing healthier coping  strategies and maintaining healthier boundaries. Assisted patient in identifying risk factors which would indicate the need for higher level of care including thoughts to harm self or others and/or self-harming behavior and encouraged patient to contact this office, call 911, or present to the nearest emergency room should any of these events occur. Pt agreeable to adhere to medication regimen as prescribed and report any side effects.  Discussed crisis intervention services and means to access.  Patient adamantly and convincingly denies current suicidal or homicidal ideation or perceptual disturbance.      Therapist implemented motivational interviewing techniques to assist client with exploring and resolving ambivalence associated with commitment to change behaviors.  Yes  Therapist utilized dialectical behavior techniques to teach and model emotional regulation and relaxation.  No   Therapist applied cognitive behavioral strategies to facilitate identification of maladaptive patterns of thinking and behavior.  Yes     PLAN:    Continue [facility] CD PHP Phase  na  Aftercare:  [facility] CD Outpatient Phase 2      Please note that portions of this note were completed with a voice recognition program. Efforts were made to edit dictation, but occasionally words are mistranscribed.     This document signed by Mulugeta Santos LCSW, September 21, 2023, 17:05 EDT
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
## 2023-09-22 ENCOUNTER — APPOINTMENT (OUTPATIENT)
Dept: PSYCHIATRY | Facility: HOSPITAL | Age: 28
End: 2023-09-22
Payer: MEDICARE

## 2023-09-22 DIAGNOSIS — F31.60 BIPOLAR AFFECTIVE DISORDER, MIXED: ICD-10-CM

## 2023-09-22 DIAGNOSIS — F10.20 ALCOHOL USE DISORDER, SEVERE, DEPENDENCE: Primary | ICD-10-CM

## 2023-09-22 PROCEDURE — H0035 MH PARTIAL HOSP TX UNDER 24H: HCPCS

## 2023-09-25 ENCOUNTER — OFFICE VISIT (OUTPATIENT)
Dept: PSYCHIATRY | Facility: HOSPITAL | Age: 28
End: 2023-09-25

## 2023-09-25 DIAGNOSIS — Z79.899 MEDICATION MANAGEMENT: Primary | ICD-10-CM

## 2023-09-25 DIAGNOSIS — F10.20 ALCOHOL USE DISORDER, SEVERE, DEPENDENCE: Primary | ICD-10-CM

## 2023-09-25 DIAGNOSIS — F31.60 BIPOLAR AFFECTIVE DISORDER, MIXED: ICD-10-CM

## 2023-09-25 PROCEDURE — G0177 OPPS/PHP; TRAIN & EDUC SERV: HCPCS | Performed by: SOCIAL WORKER

## 2023-09-25 PROCEDURE — G0410 GRP PSYCH PARTIAL HOSP 45-50: HCPCS | Performed by: SOCIAL WORKER

## 2023-09-25 PROCEDURE — H0035 MH PARTIAL HOSP TX UNDER 24H: HCPCS | Performed by: SOCIAL WORKER

## 2023-09-25 NOTE — PROGRESS NOTES
NAME: Eduardo Cannon  DATE: 09/25/2023    [facility] Phase  na  [id number]    Services Provided    Group Psychotherapy x 2  Education/Training x 2  Activity Therapy  x 0  Individual Therapy x 0 0 minutes  Family Therapy x 0  MD Initial Visit  x 0  MD Follow-Up   x 0    Number of participants: 2    DATA:  Patient reported his weekend was alright mostly. He'd had a day off, but he had run out of his medication on Thursday morning. By midday Saturday he felt irritable, and this was more frustrating because he realized more how dependent he was on the Risperdal. More than that, he felt like he should have made it longer than two days without medication before irritability resurfaced. He was pleased to report that he had not used alcohol this weekend, but he had gambled nearly all his money away. He supposed that becoming engaged in conspiracy theories this weekend had been stimulating enough to prevent him from using alcohol this weekend. He had the day off today, and he was having a friend is coming over to help him occupy some of his down time.     Regarding the drug screen from Thursday, the patient reported that the amphetamines in his system had been the adderall prescription.     Individual: No    Homework: Assigned Relapse Prevention Plan    Group 1 (Psychotherapy: Check-ins): Therapist continued facilitation of rapport building strategies between group members. Therapist asked that each patient check in with home life and recovery efforts and identify triggers, cravings, and high risk situations that arise between group sessions.  Group members discussed barriers and benefits of attending recovery meetings.  Therapist provided empathy and support during group session. Daily Reading: The Last Lecture (2008) by Lele Funez    Group 2  (Relapse Prevention) Began completing the relapse prevention plan. Discussed risk factors and the importance of mitigating them.     Group 3 (Relapse Prevention) Continued  conversation about relapse prevention, discussing the importance of strong motivating sources.    Group 4 (Anxiety) Viewed the video, Anxious? Follow These Steps (2020) by [employer] with Dr. Jacki Escobedo on YouTube.      ASSESSMENT:    Personal Assessment 0-10 Scale (10 worst)    Anxiety:  7 (no comment)  Depression:  5 (mostly over the weekend)  Cravings: 7 (but, he resisted the urge)     MSE within normal limits? Yes Affect: somber Mood: calm  Pt Response:  open/receptive  Engaged in activity/Process and self-disclosed: adequate  Applies today's group topics to self: adequate  Able to give and receive feedback: adequate  Demonstrated insightful thinking: consistent and adequate  Other pt response comments:  Patient was a positive participant. They demonstrated a relatively positive attitude, a strong degree of motivation, and adequate insight, as evidenced by his level of engagement combined with his open-mindedness. They seemed interested and attentive. They appeared to comprehend well the concepts being discussed. The patient seemed receptive of, and willing to implement, the ideas being discussed. Their thought process appeared linear and goal directed, and their speech was regular rate and rhythm.       Community Group Engagement:  No: not engaged    Reported relapse since last meeting? No: no lapse    .  Orders Only on 09/21/2023   Component Date Value Ref Range Status    External Amphetamine Screen Urine 09/21/2023 Positive (A)   Final    External Benzodiazepine Screen Uri* 09/21/2023 Positive (A)   Final    External Cocaine Screen Urine 09/21/2023 Negative   Final    External THC Screen Urine 09/21/2023 Negative   Final    External Methadone Screen Urine 09/21/2023 Negative   Final    External Methamphetamine Screen Ur* 09/21/2023 Negative   Final    External Oxycodone Screen Urine 09/21/2023 Negative   Final    External Buprenorphine Screen Urine 09/21/2023 Negative   Final    External MDMA 09/21/2023  Negative   Final    External Opiates Screen Urine 09/21/2023 Negative   Final   Lab on 09/21/2023   Component Date Value Ref Range Status    Ethanol 09/21/2023 <10  0 - 10 mg/dL Final    Ethanol % 09/21/2023 <0.010  % Final    Glucose 09/21/2023 109 (H)  65 - 99 mg/dL Final    BUN 09/21/2023 11  6 - 20 mg/dL Final    Creatinine 09/21/2023 0.85  0.76 - 1.27 mg/dL Final    Sodium 09/21/2023 140  136 - 145 mmol/L Final    Potassium 09/21/2023 4.4  3.5 - 5.2 mmol/L Final    Chloride 09/21/2023 105  98 - 107 mmol/L Final    CO2 09/21/2023 22.9  22.0 - 29.0 mmol/L Final    Calcium 09/21/2023 9.9  8.6 - 10.5 mg/dL Final    Total Protein 09/21/2023 7.8  6.0 - 8.5 g/dL Final    Albumin 09/21/2023 5.2  3.5 - 5.2 g/dL Final    ALT (SGPT) 09/21/2023 138 (H)  1 - 41 U/L Final    AST (SGOT) 09/21/2023 53 (H)  1 - 40 U/L Final    Alkaline Phosphatase 09/21/2023 113  39 - 117 U/L Final    Total Bilirubin 09/21/2023 0.5  0.0 - 1.2 mg/dL Final    Globulin 09/21/2023 2.6  gm/dL Final    A/G Ratio 09/21/2023 2.0  g/dL Final    BUN/Creatinine Ratio 09/21/2023 12.9  7.0 - 25.0 Final    Anion Gap 09/21/2023 12.1  5.0 - 15.0 mmol/L Final    eGFR 09/21/2023 122.9  >60.0 mL/min/1.73 Final   Orders Only on 09/18/2023   Component Date Value Ref Range Status    External Amphetamine Screen Urine 09/18/2023 Positive (A)   Final    External Benzodiazepine Screen Uri* 09/18/2023 Positive (A)   Final    External Cocaine Screen Urine 09/18/2023 Negative   Final    External THC Screen Urine 09/18/2023 Negative   Final    External Methadone Screen Urine 09/18/2023 Negative   Final    External Methamphetamine Screen Ur* 09/18/2023 Negative   Final    External Oxycodone Screen Urine 09/18/2023 Negative   Final    External Buprenorphine Screen Urine 09/18/2023 Negative   Final    External MDMA 09/18/2023 Negative   Final    External Opiates Screen Urine 09/18/2023 Negative   Final   Office Visit on 09/11/2023   Component Date Value Ref Range Status     External Amphetamine Screen Urine 09/11/2023 Negative   Final    External Benzodiazepine Screen Uri* 09/11/2023 Positive (A)   Final    External Cocaine Screen Urine 09/11/2023 Negative   Final    External THC Screen Urine 09/11/2023 Negative   Final    External Methadone Screen Urine 09/11/2023 Negative   Final    External Methamphetamine Screen Ur* 09/11/2023 Negative   Final    External Oxycodone Screen Urine 09/11/2023 Negative   Final    External Buprenorphine Screen Urine 09/11/2023 Negative   Final    External MDMA 09/11/2023 Negative   Final    External Opiates Screen Urine 09/11/2023 Negative   Final   Orders Only on 09/07/2023   Component Date Value Ref Range Status    External Amphetamine Screen Urine 09/07/2023 Negative   Final    External Benzodiazepine Screen Uri* 09/07/2023 Positive (A)   Final    External Cocaine Screen Urine 09/07/2023 Negative   Final    External THC Screen Urine 09/07/2023 Negative   Final    External Methadone Screen Urine 09/07/2023 Negative   Final    External Methamphetamine Screen Ur* 09/07/2023 Negative   Final    External Oxycodone Screen Urine 09/07/2023 Negative   Final    External Buprenorphine Screen Urine 09/07/2023 Negative   Final    External MDMA 09/07/2023 Negative   Final    External Opiates Screen Urine 09/07/2023 Negative   Final       Impression/Formulation:    ICD-10-CM ICD-9-CM   1. Alcohol use disorder, severe, dependence  F10.20 303.90   2. Bipolar affective disorder, mixed  F31.60 296.60        CLINICAL MANEUVERING/INTERVENTIONS: Therapist utilized a person-centered approach to build and maintain rapport with group member.   Therapist promoted safe nonjudgmental environment by providing group members with unconditional positive regard.  Assisted member in processing above session content; acknowledged and normalized patient's thoughts, feelings and concerns.  Allowed patient to freely discuss issues without interruption or judgment. Provided safe,  confidential environment to facilitate the development of positive therapeutic relationship and encourage open, honest communication. Therapist assisted group member with identifying and implementing healthier coping strategies and maintaining healthier boundaries. Assisted patient in identifying risk factors which would indicate the need for higher level of care including thoughts to harm self or others and/or self-harming behavior and encouraged patient to contact this office, call 911, or present to the nearest emergency room should any of these events occur. Pt agreeable to adhere to medication regimen as prescribed and report any side effects.  Discussed crisis intervention services and means to access.  Patient adamantly and convincingly denies current suicidal or homicidal ideation or perceptual disturbance.      Therapist implemented motivational interviewing techniques to assist client with exploring and resolving ambivalence associated with commitment to change behaviors.  Yes  Therapist utilized dialectical behavior techniques to teach and model emotional regulation and relaxation.  No   Therapist applied cognitive behavioral strategies to facilitate identification of maladaptive patterns of thinking and behavior.  Yes     PLAN:    Continue [facility] CD PHP Phase  na  Aftercare:  [facility] CD Outpatient Phase 2      Please note that portions of this note were completed with a voice recognition program. Efforts were made to edit dictation, but occasionally words are mistranscribed.     This document signed by Mulugeta Santos LCSW, September 25, 2023, 16:01 EDT

## 2023-09-26 ENCOUNTER — OFFICE VISIT (OUTPATIENT)
Dept: PSYCHIATRY | Facility: HOSPITAL | Age: 28
End: 2023-09-26
Payer: MEDICARE

## 2023-09-26 DIAGNOSIS — F10.20 ALCOHOL USE DISORDER, SEVERE, DEPENDENCE: Primary | ICD-10-CM

## 2023-09-26 DIAGNOSIS — F31.60 BIPOLAR AFFECTIVE DISORDER, MIXED: ICD-10-CM

## 2023-09-26 PROCEDURE — G0176 OPPS/PHP;ACTIVITY THERAPY: HCPCS | Performed by: SOCIAL WORKER

## 2023-09-26 PROCEDURE — H0035 MH PARTIAL HOSP TX UNDER 24H: HCPCS | Performed by: SOCIAL WORKER

## 2023-09-26 PROCEDURE — G0410 GRP PSYCH PARTIAL HOSP 45-50: HCPCS | Performed by: SOCIAL WORKER

## 2023-09-26 PROCEDURE — G0177 OPPS/PHP; TRAIN & EDUC SERV: HCPCS | Performed by: SOCIAL WORKER

## 2023-09-26 NOTE — PROGRESS NOTES
NAME: Eduardo Cannon  DATE: 09/26/2023    [facility] Phase  na  [phone]    Services Provided    Group Psychotherapy x 1  Education/Training x 2  Activity Therapy  x 1  Individual Therapy x 0 0 minutes  Family Therapy x 0  MD Initial Visit  x 0  MD Follow-Up   x 0    Number of participants: 3    DATA:  Patient reported he was doing pretty good. He supposed that having taken his medication yesterday and this morning had helped dampen his anxiety and depression a bit. He also believed the day off work had helped him a lot. He was feeling less irritable.    Individual: No    Homework: returned Relapse Prevention Plan    Group 1 (Psychotherapy: Check-ins): Therapist continued facilitation of rapport building strategies between group members. Therapist asked that each patient check in with home life and recovery efforts and identify triggers, cravings, and high risk situations that arise between group sessions.  Group members discussed barriers and benefits of attending recovery meetings.  Therapist provided empathy and support during group session. Daily Reading: None    Group 2  ( Recreation Therapy ) music trivia     Group 3 (CBT) Participants were introduced to the cognitive model. An example scenario was used as an illustration. We reviewed together as a group a list of cognitive distortions.    Group 4 (Psychotherapy) This hour of group was facilitated by NEGRITO Gambino.      ASSESSMENT:    Personal Assessment 0-10 Scale (10 worst)    Anxiety:  4 (no comment)  Depression:  2 (no comment)  Cravings: 0 (no comment)     MSE within normal limits? Yes Affect: somber Mood: calm  Pt Response:  open/receptive  Engaged in activity/Process and self-disclosed: adequate  Applies today's group topics to self: adequate  Able to give and receive feedback: adequate  Demonstrated insightful thinking: consistent and adequate  Other pt response comments:  Patient was a positive participant. They demonstrated a relatively positive  attitude, a strong degree of motivation, and adequate insight, as evidenced by his level of engagement combined with his level of open-mindedness. They seemed interested and attentive. They appeared to comprehend well the concepts being discussed. The patient seemed receptive of, and willing to implement, the ideas being discussed. Their thought process appeared linear and goal directed, and their speech was regular rate and rhythm.       Community Group Engagement:  No: not engaged    Reported relapse since last meeting? No: no lapse    .  Orders Only on 09/25/2023   Component Date Value Ref Range Status    External Amphetamine Screen Urine 09/25/2023 Positive (A)   Final    External Benzodiazepine Screen Uri* 09/25/2023 Positive (A)   Final    External Cocaine Screen Urine 09/25/2023 Negative   Final    External THC Screen Urine 09/25/2023 Negative   Final    External Methadone Screen Urine 09/25/2023 Negative   Final    External Methamphetamine Screen Ur* 09/25/2023 Negative   Final    External Oxycodone Screen Urine 09/25/2023 Negative   Final    External Buprenorphine Screen Urine 09/25/2023 Negative   Final    External MDMA 09/25/2023 Negative   Final    External Opiates Screen Urine 09/25/2023 Negative   Final   Orders Only on 09/21/2023   Component Date Value Ref Range Status    External Amphetamine Screen Urine 09/21/2023 Positive (A)   Final    External Benzodiazepine Screen Uri* 09/21/2023 Positive (A)   Final    External Cocaine Screen Urine 09/21/2023 Negative   Final    External THC Screen Urine 09/21/2023 Negative   Final    External Methadone Screen Urine 09/21/2023 Negative   Final    External Methamphetamine Screen Ur* 09/21/2023 Negative   Final    External Oxycodone Screen Urine 09/21/2023 Negative   Final    External Buprenorphine Screen Urine 09/21/2023 Negative   Final    External MDMA 09/21/2023 Negative   Final    External Opiates Screen Urine 09/21/2023 Negative   Final   Lab on 09/21/2023    Component Date Value Ref Range Status    Ethanol 09/21/2023 <10  0 - 10 mg/dL Final    Ethanol % 09/21/2023 <0.010  % Final    Glucose 09/21/2023 109 (H)  65 - 99 mg/dL Final    BUN 09/21/2023 11  6 - 20 mg/dL Final    Creatinine 09/21/2023 0.85  0.76 - 1.27 mg/dL Final    Sodium 09/21/2023 140  136 - 145 mmol/L Final    Potassium 09/21/2023 4.4  3.5 - 5.2 mmol/L Final    Chloride 09/21/2023 105  98 - 107 mmol/L Final    CO2 09/21/2023 22.9  22.0 - 29.0 mmol/L Final    Calcium 09/21/2023 9.9  8.6 - 10.5 mg/dL Final    Total Protein 09/21/2023 7.8  6.0 - 8.5 g/dL Final    Albumin 09/21/2023 5.2  3.5 - 5.2 g/dL Final    ALT (SGPT) 09/21/2023 138 (H)  1 - 41 U/L Final    AST (SGOT) 09/21/2023 53 (H)  1 - 40 U/L Final    Alkaline Phosphatase 09/21/2023 113  39 - 117 U/L Final    Total Bilirubin 09/21/2023 0.5  0.0 - 1.2 mg/dL Final    Globulin 09/21/2023 2.6  gm/dL Final    A/G Ratio 09/21/2023 2.0  g/dL Final    BUN/Creatinine Ratio 09/21/2023 12.9  7.0 - 25.0 Final    Anion Gap 09/21/2023 12.1  5.0 - 15.0 mmol/L Final    eGFR 09/21/2023 122.9  >60.0 mL/min/1.73 Final   Orders Only on 09/18/2023   Component Date Value Ref Range Status    External Amphetamine Screen Urine 09/18/2023 Positive (A)   Final    External Benzodiazepine Screen Uri* 09/18/2023 Positive (A)   Final    External Cocaine Screen Urine 09/18/2023 Negative   Final    External THC Screen Urine 09/18/2023 Negative   Final    External Methadone Screen Urine 09/18/2023 Negative   Final    External Methamphetamine Screen Ur* 09/18/2023 Negative   Final    External Oxycodone Screen Urine 09/18/2023 Negative   Final    External Buprenorphine Screen Urine 09/18/2023 Negative   Final    External MDMA 09/18/2023 Negative   Final    External Opiates Screen Urine 09/18/2023 Negative   Final   Office Visit on 09/11/2023   Component Date Value Ref Range Status    External Amphetamine Screen Urine 09/11/2023 Negative   Final    External Benzodiazepine Screen Uri*  09/11/2023 Positive (A)   Final    External Cocaine Screen Urine 09/11/2023 Negative   Final    External THC Screen Urine 09/11/2023 Negative   Final    External Methadone Screen Urine 09/11/2023 Negative   Final    External Methamphetamine Screen Ur* 09/11/2023 Negative   Final    External Oxycodone Screen Urine 09/11/2023 Negative   Final    External Buprenorphine Screen Urine 09/11/2023 Negative   Final    External MDMA 09/11/2023 Negative   Final    External Opiates Screen Urine 09/11/2023 Negative   Final   Orders Only on 09/07/2023   Component Date Value Ref Range Status    External Amphetamine Screen Urine 09/07/2023 Negative   Final    External Benzodiazepine Screen Uri* 09/07/2023 Positive (A)   Final    External Cocaine Screen Urine 09/07/2023 Negative   Final    External THC Screen Urine 09/07/2023 Negative   Final    External Methadone Screen Urine 09/07/2023 Negative   Final    External Methamphetamine Screen Ur* 09/07/2023 Negative   Final    External Oxycodone Screen Urine 09/07/2023 Negative   Final    External Buprenorphine Screen Urine 09/07/2023 Negative   Final    External MDMA 09/07/2023 Negative   Final    External Opiates Screen Urine 09/07/2023 Negative   Final       Impression/Formulation:    ICD-10-CM ICD-9-CM   1. Alcohol use disorder, severe, dependence  F10.20 303.90   2. Bipolar affective disorder, mixed  F31.60 296.60        CLINICAL MANEUVERING/INTERVENTIONS: Therapist utilized a person-centered approach to build and maintain rapport with group member.   Therapist promoted safe nonjudgmental environment by providing group members with unconditional positive regard.  Assisted member in processing above session content; acknowledged and normalized patient's thoughts, feelings and concerns.  Allowed patient to freely discuss issues without interruption or judgment. Provided safe, confidential environment to facilitate the development of positive therapeutic relationship and encourage  open, honest communication. Therapist assisted group member with identifying and implementing healthier coping strategies and maintaining healthier boundaries. Assisted patient in identifying risk factors which would indicate the need for higher level of care including thoughts to harm self or others and/or self-harming behavior and encouraged patient to contact this office, call 911, or present to the nearest emergency room should any of these events occur. Pt agreeable to adhere to medication regimen as prescribed and report any side effects.  Discussed crisis intervention services and means to access.  Patient adamantly and convincingly denies current suicidal or homicidal ideation or perceptual disturbance.      Therapist implemented motivational interviewing techniques to assist client with exploring and resolving ambivalence associated with commitment to change behaviors.  Yes  Therapist utilized dialectical behavior techniques to teach and model emotional regulation and relaxation.  No   Therapist applied cognitive behavioral strategies to facilitate identification of maladaptive patterns of thinking and behavior.  Yes     PLAN:    Continue [facility] PHP Phase  na  Aftercare:  [facility] Outpatient Phase 2      Please note that portions of this note were completed with a voice recognition program. Efforts were made to edit dictation, but occasionally words are mistranscribed.     This document signed by Mulugeta Santos LCSW, September 26, 2023, 09:13 EDT

## 2023-09-26 NOTE — PROGRESS NOTES
Adult Noon Group      Date: September 26, 2023  Time: [id number]    Type of Group:  Psychoeducation    Group  Content: Therapist facilitated group therapy session focused on positive psychology exercise, Silver Linings. Group members discussed the benefits of the exercise and applied it to their lives. Group members began by listing things that make their lives enjoyable with the goal of getting into a positive mindset. Group members then identified a recent moment of frustration. Lastly, group members identified silver linings, or positives, in their challenges.     Patient Response: Patient was calm and cooperative with group. Patient completed the exercise appropriately. He reports that he has been trying to recognize the positives during challenges he has faced recently. Patient was able to identify several things he could think of or engage in to help him have a positive mindset. He identifies a recent moment of frustration as losing money gambling. Patient discussed his typical responses to frustration and identified the risks associated. Patient identified silver linings, including a reinforced understanding of the dangers of gambling and having relief from his urge to murray.         Nelia Hendrix  09/26/23  17:10 EDT

## 2023-09-27 ENCOUNTER — OFFICE VISIT (OUTPATIENT)
Dept: PSYCHIATRY | Facility: HOSPITAL | Age: 28
End: 2023-09-27
Payer: MEDICARE

## 2023-09-27 DIAGNOSIS — F31.60 BIPOLAR AFFECTIVE DISORDER, MIXED: ICD-10-CM

## 2023-09-27 DIAGNOSIS — F10.20 ALCOHOL USE DISORDER, SEVERE, DEPENDENCE: Primary | ICD-10-CM

## 2023-09-27 PROCEDURE — H0035 MH PARTIAL HOSP TX UNDER 24H: HCPCS | Performed by: SOCIAL WORKER

## 2023-09-27 PROCEDURE — G0177 OPPS/PHP; TRAIN & EDUC SERV: HCPCS | Performed by: SOCIAL WORKER

## 2023-09-27 PROCEDURE — G0410 GRP PSYCH PARTIAL HOSP 45-50: HCPCS | Performed by: SOCIAL WORKER

## 2023-09-27 NOTE — PROGRESS NOTES
"   NAME: Eduardo Cannon  DATE: 09/27/2023    [facility] Phase  na  [phone]    Services Provided    Group Psychotherapy x 2  Education/Training x 2  Activity Therapy  x 0  Individual Therapy x 0 0 minutes  Family Therapy x 0  MD Initial Visit  x 0  MD Follow-Up   x 0    Number of participants: 3    DATA:  Patient reported he was doing pretty well. He had referred a friend on his [employer] account, and had earned a $1,000 bonus as a result. He planned to go get his teeth fixed. He planned to get them all taken out. Also, he had decided to cover up all his tattoos. He had simply become tired of the ones he had.     Individual: No    Homework: Assigned Worry Time Practice Exercise     Group 1 (Psychotherapy: Check-ins): Therapist continued facilitation of rapport building strategies between group members. Therapist asked that each patient check in with home life and recovery efforts and identify triggers, cravings, and high risk situations that arise between group sessions.  Group members discussed barriers and benefits of attending recovery meetings.  Therapist provided empathy and support during group session. Daily Reading: The Great Divorce (1945) by KELTON Beltran    Group 2  (Emotional Intelligence) Viewed the video, You are not at the mercy of your emotions (2018) by LIAM with Elma Sims on YouTube.     Group 3 (CBT) Discussed the coping skill of \"worry time.\" Provided a worksheet for homework.     Group 4 (Coping Skills) Viewed the video, 10 Quick Anxiety Relief Techniques (2020) by [employer] with Dr. Jacki Escobedo on YouTube.     ASSESSMENT:    Personal Assessment 0-10 Scale (10 worst)    Anxiety:  3 (no comment)  Depression:  1 (no comment)  Cravings: 0 (no comment)     MSE within normal limits? No Affect: somber Mood: depressed  Pt Response:  open/receptive  Engaged in activity/Process and self-disclosed: suboptimal  Applies today's group topics to self: suboptimal  Able to give and receive feedback: " suboptimal  Demonstrated insightful thinking: consistent and adequate  Other pt response comments:  Patient was a positive participant. They demonstrated a relatively optimistic attitude, a strong degree of motivation, and adequate insight, as evidenced by his lower level of engagement combined with his apparent fatigue from having worked all night. They seemed interested and distracted. They appeared to comprehend well the concepts being discussed. The patient seemed receptive of, and willing to implement, the ideas being discussed. Their thought process appeared linear and goal directed, and their speech was regular rate and rhythm.       Community Group Engagement:  No: not engaged    Reported relapse since last meeting? No: no lapse    .  Orders Only on 09/25/2023   Component Date Value Ref Range Status    External Amphetamine Screen Urine 09/25/2023 Positive (A)   Final    External Benzodiazepine Screen Uri* 09/25/2023 Positive (A)   Final    External Cocaine Screen Urine 09/25/2023 Negative   Final    External THC Screen Urine 09/25/2023 Negative   Final    External Methadone Screen Urine 09/25/2023 Negative   Final    External Methamphetamine Screen Ur* 09/25/2023 Negative   Final    External Oxycodone Screen Urine 09/25/2023 Negative   Final    External Buprenorphine Screen Urine 09/25/2023 Negative   Final    External MDMA 09/25/2023 Negative   Final    External Opiates Screen Urine 09/25/2023 Negative   Final   Orders Only on 09/21/2023   Component Date Value Ref Range Status    External Amphetamine Screen Urine 09/21/2023 Positive (A)   Final    External Benzodiazepine Screen Uri* 09/21/2023 Positive (A)   Final    External Cocaine Screen Urine 09/21/2023 Negative   Final    External THC Screen Urine 09/21/2023 Negative   Final    External Methadone Screen Urine 09/21/2023 Negative   Final    External Methamphetamine Screen Ur* 09/21/2023 Negative   Final    External Oxycodone Screen Urine 09/21/2023  Negative   Final    External Buprenorphine Screen Urine 09/21/2023 Negative   Final    External MDMA 09/21/2023 Negative   Final    External Opiates Screen Urine 09/21/2023 Negative   Final   Lab on 09/21/2023   Component Date Value Ref Range Status    Ethanol 09/21/2023 <10  0 - 10 mg/dL Final    Ethanol % 09/21/2023 <0.010  % Final    Glucose 09/21/2023 109 (H)  65 - 99 mg/dL Final    BUN 09/21/2023 11  6 - 20 mg/dL Final    Creatinine 09/21/2023 0.85  0.76 - 1.27 mg/dL Final    Sodium 09/21/2023 140  136 - 145 mmol/L Final    Potassium 09/21/2023 4.4  3.5 - 5.2 mmol/L Final    Chloride 09/21/2023 105  98 - 107 mmol/L Final    CO2 09/21/2023 22.9  22.0 - 29.0 mmol/L Final    Calcium 09/21/2023 9.9  8.6 - 10.5 mg/dL Final    Total Protein 09/21/2023 7.8  6.0 - 8.5 g/dL Final    Albumin 09/21/2023 5.2  3.5 - 5.2 g/dL Final    ALT (SGPT) 09/21/2023 138 (H)  1 - 41 U/L Final    AST (SGOT) 09/21/2023 53 (H)  1 - 40 U/L Final    Alkaline Phosphatase 09/21/2023 113  39 - 117 U/L Final    Total Bilirubin 09/21/2023 0.5  0.0 - 1.2 mg/dL Final    Globulin 09/21/2023 2.6  gm/dL Final    A/G Ratio 09/21/2023 2.0  g/dL Final    BUN/Creatinine Ratio 09/21/2023 12.9  7.0 - 25.0 Final    Anion Gap 09/21/2023 12.1  5.0 - 15.0 mmol/L Final    eGFR 09/21/2023 122.9  >60.0 mL/min/1.73 Final   Orders Only on 09/18/2023   Component Date Value Ref Range Status    External Amphetamine Screen Urine 09/18/2023 Positive (A)   Final    External Benzodiazepine Screen Uri* 09/18/2023 Positive (A)   Final    External Cocaine Screen Urine 09/18/2023 Negative   Final    External THC Screen Urine 09/18/2023 Negative   Final    External Methadone Screen Urine 09/18/2023 Negative   Final    External Methamphetamine Screen Ur* 09/18/2023 Negative   Final    External Oxycodone Screen Urine 09/18/2023 Negative   Final    External Buprenorphine Screen Urine 09/18/2023 Negative   Final    External MDMA 09/18/2023 Negative   Final    External Opiates  Screen Urine 09/18/2023 Negative   Final   Office Visit on 09/11/2023   Component Date Value Ref Range Status    External Amphetamine Screen Urine 09/11/2023 Negative   Final    External Benzodiazepine Screen Uri* 09/11/2023 Positive (A)   Final    External Cocaine Screen Urine 09/11/2023 Negative   Final    External THC Screen Urine 09/11/2023 Negative   Final    External Methadone Screen Urine 09/11/2023 Negative   Final    External Methamphetamine Screen Ur* 09/11/2023 Negative   Final    External Oxycodone Screen Urine 09/11/2023 Negative   Final    External Buprenorphine Screen Urine 09/11/2023 Negative   Final    External MDMA 09/11/2023 Negative   Final    External Opiates Screen Urine 09/11/2023 Negative   Final   Orders Only on 09/07/2023   Component Date Value Ref Range Status    External Amphetamine Screen Urine 09/07/2023 Negative   Final    External Benzodiazepine Screen Uri* 09/07/2023 Positive (A)   Final    External Cocaine Screen Urine 09/07/2023 Negative   Final    External THC Screen Urine 09/07/2023 Negative   Final    External Methadone Screen Urine 09/07/2023 Negative   Final    External Methamphetamine Screen Ur* 09/07/2023 Negative   Final    External Oxycodone Screen Urine 09/07/2023 Negative   Final    External Buprenorphine Screen Urine 09/07/2023 Negative   Final    External MDMA 09/07/2023 Negative   Final    External Opiates Screen Urine 09/07/2023 Negative   Final       Impression/Formulation:    ICD-10-CM ICD-9-CM   1. Alcohol use disorder, severe, dependence  F10.20 303.90   2. Bipolar affective disorder, mixed  F31.60 296.60        CLINICAL MANEUVERING/INTERVENTIONS: Therapist utilized a person-centered approach to build and maintain rapport with group member.   Therapist promoted safe nonjudgmental environment by providing group members with unconditional positive regard.  Assisted member in processing above session content; acknowledged and normalized patient's thoughts, feelings  and concerns.  Allowed patient to freely discuss issues without interruption or judgment. Provided safe, confidential environment to facilitate the development of positive therapeutic relationship and encourage open, honest communication. Therapist assisted group member with identifying and implementing healthier coping strategies and maintaining healthier boundaries. Assisted patient in identifying risk factors which would indicate the need for higher level of care including thoughts to harm self or others and/or self-harming behavior and encouraged patient to contact this office, call 911, or present to the nearest emergency room should any of these events occur. Pt agreeable to adhere to medication regimen as prescribed and report any side effects.  Discussed crisis intervention services and means to access.  Patient adamantly and convincingly denies current suicidal or homicidal ideation or perceptual disturbance.      Therapist implemented motivational interviewing techniques to assist client with exploring and resolving ambivalence associated with commitment to change behaviors.  Yes  Therapist utilized dialectical behavior techniques to teach and model emotional regulation and relaxation.  No   Therapist applied cognitive behavioral strategies to facilitate identification of maladaptive patterns of thinking and behavior.  Yes     PLAN:    Continue [facility]  PHP Phase  na  Aftercare:  [facility]  Outpatient Phase 2     Please note that portions of this note were completed with a voice recognition program. Efforts were made to edit dictation, but occasionally words are mistranscribed.     This document signed by Mulugeta Santos LCSW, September 27, 2023, 11:03 EDT

## 2023-09-28 ENCOUNTER — OFFICE VISIT (OUTPATIENT)
Dept: PSYCHIATRY | Facility: HOSPITAL | Age: 28
End: 2023-09-28
Payer: MEDICARE

## 2023-09-28 DIAGNOSIS — F10.20 ALCOHOL USE DISORDER, SEVERE, DEPENDENCE: Primary | ICD-10-CM

## 2023-09-28 DIAGNOSIS — F31.60 BIPOLAR AFFECTIVE DISORDER, MIXED: ICD-10-CM

## 2023-09-28 PROCEDURE — 90832 PSYTX W PT 30 MINUTES: CPT | Performed by: SOCIAL WORKER

## 2023-09-28 PROCEDURE — H0035 MH PARTIAL HOSP TX UNDER 24H: HCPCS | Performed by: SOCIAL WORKER

## 2023-09-28 PROCEDURE — G0410 GRP PSYCH PARTIAL HOSP 45-50: HCPCS | Performed by: SOCIAL WORKER

## 2023-09-28 NOTE — PROGRESS NOTES
NAME: Eduardo Cannon  DATE: 09/28/2023    [facility] Phase  na  [phone]    Services Provided    Group Psychotherapy x 2  Education/Training x 2  Activity Therapy  x 0  Individual Therapy x 0 0 minutes  Family Therapy x 0  MD Initial Visit  x 0  MD Follow-Up   x 0    Number of participants: 4    DATA:  Patient reported he was doing alright. He had been focused on selling stocks, and he was referring people for bonuses. He also reported having gambled again last night. He won $420, and he was able to stop.     Individual: No    Homework: Assigned Mindfulness Meditation     Group 1 (Psychotherapy: Check-ins): Therapist continued facilitation of rapport building strategies between group members. Therapist asked that each patient check in with home life and recovery efforts and identify triggers, cravings, and high risk situations that arise between group sessions.  Group members discussed barriers and benefits of attending recovery meetings.  Therapist provided empathy and support during group session. Daily Reading: None    Group 2  (Mindfulness) Viewed the video, All it takes is 10 mindful minutes (2013) by [employer] with Gamaliel Campbell on YouTube. Discussed mindfulness together as a group, emphasizing its key tenets and its intended functions.     Group 3 (Psychoeducation) This hour of group was facilitated by DEBRA Quintana.     Group 4 (Psychotherapy) This hour of group was facilitated by NEGRITO Gambino.      ASSESSMENT:    Personal Assessment 0-10 Scale (10 worst)    Anxiety:  2 (no comment)  Depression:  0 (no comment)  Cravings: 0 (no comment)     MSE within normal limits? Yes Affect: somber Mood: calm  Pt Response:  open/receptive  Engaged in activity/Process and self-disclosed: adequate  Applies today's group topics to self: adequate  Able to give and receive feedback: adequate  Demonstrated insightful thinking: consistent and adequate  Other pt response comments:  Patient was a positive participant. They  demonstrated a relatively positive attitude, a strong degree of motivation, and adequate insight, as evidenced by his level of engagement combined with his positive attitude today. They seemed interested and attentive. They appeared to comprehend well the concepts being discussed. The patient seemed receptive of, and willing to implement, the ideas being discussed. Their thought process appeared linear and goal directed, and their speech was regular rate and rhythm.       Community Group Engagement:  No: not involved    Reported relapse since last meeting? No: no lapse    .  Orders Only on 09/25/2023   Component Date Value Ref Range Status    External Amphetamine Screen Urine 09/25/2023 Positive (A)   Final    External Benzodiazepine Screen Uri* 09/25/2023 Positive (A)   Final    External Cocaine Screen Urine 09/25/2023 Negative   Final    External THC Screen Urine 09/25/2023 Negative   Final    External Methadone Screen Urine 09/25/2023 Negative   Final    External Methamphetamine Screen Ur* 09/25/2023 Negative   Final    External Oxycodone Screen Urine 09/25/2023 Negative   Final    External Buprenorphine Screen Urine 09/25/2023 Negative   Final    External MDMA 09/25/2023 Negative   Final    External Opiates Screen Urine 09/25/2023 Negative   Final   Orders Only on 09/21/2023   Component Date Value Ref Range Status    External Amphetamine Screen Urine 09/21/2023 Positive (A)   Final    External Benzodiazepine Screen Uri* 09/21/2023 Positive (A)   Final    External Cocaine Screen Urine 09/21/2023 Negative   Final    External THC Screen Urine 09/21/2023 Negative   Final    External Methadone Screen Urine 09/21/2023 Negative   Final    External Methamphetamine Screen Ur* 09/21/2023 Negative   Final    External Oxycodone Screen Urine 09/21/2023 Negative   Final    External Buprenorphine Screen Urine 09/21/2023 Negative   Final    External MDMA 09/21/2023 Negative   Final    External Opiates Screen Urine 09/21/2023  Negative   Final   Lab on 09/21/2023   Component Date Value Ref Range Status    Ethanol 09/21/2023 <10  0 - 10 mg/dL Final    Ethanol % 09/21/2023 <0.010  % Final    Glucose 09/21/2023 109 (H)  65 - 99 mg/dL Final    BUN 09/21/2023 11  6 - 20 mg/dL Final    Creatinine 09/21/2023 0.85  0.76 - 1.27 mg/dL Final    Sodium 09/21/2023 140  136 - 145 mmol/L Final    Potassium 09/21/2023 4.4  3.5 - 5.2 mmol/L Final    Chloride 09/21/2023 105  98 - 107 mmol/L Final    CO2 09/21/2023 22.9  22.0 - 29.0 mmol/L Final    Calcium 09/21/2023 9.9  8.6 - 10.5 mg/dL Final    Total Protein 09/21/2023 7.8  6.0 - 8.5 g/dL Final    Albumin 09/21/2023 5.2  3.5 - 5.2 g/dL Final    ALT (SGPT) 09/21/2023 138 (H)  1 - 41 U/L Final    AST (SGOT) 09/21/2023 53 (H)  1 - 40 U/L Final    Alkaline Phosphatase 09/21/2023 113  39 - 117 U/L Final    Total Bilirubin 09/21/2023 0.5  0.0 - 1.2 mg/dL Final    Globulin 09/21/2023 2.6  gm/dL Final    A/G Ratio 09/21/2023 2.0  g/dL Final    BUN/Creatinine Ratio 09/21/2023 12.9  7.0 - 25.0 Final    Anion Gap 09/21/2023 12.1  5.0 - 15.0 mmol/L Final    eGFR 09/21/2023 122.9  >60.0 mL/min/1.73 Final   Orders Only on 09/18/2023   Component Date Value Ref Range Status    External Amphetamine Screen Urine 09/18/2023 Positive (A)   Final    External Benzodiazepine Screen Uri* 09/18/2023 Positive (A)   Final    External Cocaine Screen Urine 09/18/2023 Negative   Final    External THC Screen Urine 09/18/2023 Negative   Final    External Methadone Screen Urine 09/18/2023 Negative   Final    External Methamphetamine Screen Ur* 09/18/2023 Negative   Final    External Oxycodone Screen Urine 09/18/2023 Negative   Final    External Buprenorphine Screen Urine 09/18/2023 Negative   Final    External MDMA 09/18/2023 Negative   Final    External Opiates Screen Urine 09/18/2023 Negative   Final   Office Visit on 09/11/2023   Component Date Value Ref Range Status    External Amphetamine Screen Urine 09/11/2023 Negative   Final     External Benzodiazepine Screen Uri* 09/11/2023 Positive (A)   Final    External Cocaine Screen Urine 09/11/2023 Negative   Final    External THC Screen Urine 09/11/2023 Negative   Final    External Methadone Screen Urine 09/11/2023 Negative   Final    External Methamphetamine Screen Ur* 09/11/2023 Negative   Final    External Oxycodone Screen Urine 09/11/2023 Negative   Final    External Buprenorphine Screen Urine 09/11/2023 Negative   Final    External MDMA 09/11/2023 Negative   Final    External Opiates Screen Urine 09/11/2023 Negative   Final       Impression/Formulation:    ICD-10-CM ICD-9-CM   1. Alcohol use disorder, severe, dependence  F10.20 303.90   2. Bipolar affective disorder, mixed  F31.60 296.60        CLINICAL MANEUVERING/INTERVENTIONS: Therapist utilized a person-centered approach to build and maintain rapport with group member.   Therapist promoted safe nonjudgmental environment by providing group members with unconditional positive regard.  Assisted member in processing above session content; acknowledged and normalized patient's thoughts, feelings and concerns.  Allowed patient to freely discuss issues without interruption or judgment. Provided safe, confidential environment to facilitate the development of positive therapeutic relationship and encourage open, honest communication. Therapist assisted group member with identifying and implementing healthier coping strategies and maintaining healthier boundaries. Assisted patient in identifying risk factors which would indicate the need for higher level of care including thoughts to harm self or others and/or self-harming behavior and encouraged patient to contact this office, call 911, or present to the nearest emergency room should any of these events occur. Pt agreeable to adhere to medication regimen as prescribed and report any side effects.  Discussed crisis intervention services and means to access.  Patient adamantly and convincingly denies  current suicidal or homicidal ideation or perceptual disturbance.      Therapist implemented motivational interviewing techniques to assist client with exploring and resolving ambivalence associated with commitment to change behaviors.  Yes  Therapist utilized dialectical behavior techniques to teach and model emotional regulation and relaxation.  No   Therapist applied cognitive behavioral strategies to facilitate identification of maladaptive patterns of thinking and behavior.  Yes     PLAN:    Continue [facility] PHP Phase  na  Aftercare:  [facility] Outpatient Phase 2      Please note that portions of this note were completed with a voice recognition program. Efforts were made to edit dictation, but occasionally words are mistranscribed.     This document signed by Mulugeta Santos LCSW, September 28, 2023, 14:53 EDT

## 2023-09-29 ENCOUNTER — APPOINTMENT (OUTPATIENT)
Dept: PSYCHIATRY | Facility: HOSPITAL | Age: 28
End: 2023-09-29
Payer: MEDICARE

## 2023-09-29 DIAGNOSIS — F10.20 ALCOHOL USE DISORDER, SEVERE, DEPENDENCE: Primary | ICD-10-CM

## 2023-09-29 PROCEDURE — G0410 GRP PSYCH PARTIAL HOSP 45-50: HCPCS

## 2023-09-29 PROCEDURE — G0177 OPPS/PHP; TRAIN & EDUC SERV: HCPCS | Performed by: SOCIAL WORKER

## 2023-09-29 RX ORDER — RISPERIDONE 1 MG/1
TABLET ORAL
Qty: 14 TABLET | Refills: 0 | Status: SHIPPED | OUTPATIENT
Start: 2023-09-29

## 2023-09-29 NOTE — PROGRESS NOTES
Adult Noon Group      Date: September 28, 2023  Time: [id number]    Type of Group:  Psychoeducation    Group  Content: Therapist facilitated group therapy session focused on urge surfing. Group members identified an unwanted behavior to apply the skill to. Group members identified triggers, rise of the urge, peak of the urge, and fall of the urge. Group members learned to practice urge surfing and discussed other skills to use in times of discomfort or to cope with triggers.     Patient Response: Patient was calm and cooperative with group. He identified gambling as an unwanted behavior he will practice urge surfing with. Patient identified a trigger as thoughts of winning money. He states his urge become more intense very suddenly. Patient reports recently experiencing the peak of an urge. Patient is agreeable with practicing urge surfing. He identified reminders he can use while in the peak of an urge to help him resist engaging in the unwanted behavior.         Nelia Hendrix  09/29/23  15:52 EDT

## 2023-10-02 ENCOUNTER — OFFICE VISIT (OUTPATIENT)
Dept: PSYCHIATRY | Facility: HOSPITAL | Age: 28
End: 2023-10-02
Payer: MEDICARE

## 2023-10-02 DIAGNOSIS — F31.60 BIPOLAR AFFECTIVE DISORDER, MIXED: ICD-10-CM

## 2023-10-02 DIAGNOSIS — F10.20 ALCOHOL USE DISORDER, SEVERE, DEPENDENCE: Primary | ICD-10-CM

## 2023-10-02 PROCEDURE — H0035 MH PARTIAL HOSP TX UNDER 24H: HCPCS | Performed by: SOCIAL WORKER

## 2023-10-02 NOTE — PROGRESS NOTES
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 He planned to use the money to have his teeth fixed. Otherwise, he had slept all weekend. Regarding gambling, the patient reported having lost a little over $100. Technically, he reasoned he was still up because he had won over $400 the other day. A fellow group member suggested he do a relapse autopsy related to the gambling. He was open to the suggestion. Still, he believed the best thing for him would be to find a different job that was not so near to a gas station with gambling machines.The patient also shared that the saba next to him won $1200. He said there was something about the saba's face that pissed him off. He was noticing his anger felt different, which he attributed to the medication regimen.     Individual: No    Homework: None assigned    Group 1 (Psychotherapy: Check-ins): Therapist continued facilitation of rapport building strategies between group members. Therapist asked that each patient check in with home life and recovery efforts and identify triggers, cravings, and high risk situations that arise between group sessions.  Group members discussed barriers and benefits of attending recovery meetings.  Therapist provided empathy and support during group session. Daily Reading: None    Group 2  (Sleep Hygiene) Reviewed a worksheet from [employer].     Group 3 (Sleep Hygiene) Viewed and discussed the video, How to get better sleep (2020) by [employer] with Dr. Jacki Escobedo on YouTube.    Group 4 (Behavioral Activation) Participants completed a worksheet prompting them to choose a behavioral experiment for the  purpose of challenging irrational thoughts or beliefs.      ASSESSMENT:    Personal Assessment 0-10 Scale (10 worst)    Anxiety:  2 (no comment)  Depression:  0 (no comment)  Cravings: 0 (no comment)     MSE within normal limits? Yes Affect: somber Mood: calm  Pt Response:  guarded  Engaged in activity/Process and self-disclosed: suboptimal  Applies today's group topics to self: suboptimal  Able to give and receive feedback: adequate  Demonstrated insightful thinking: occasional and suboptimal  Other pt response comments:  Patient was a positive participant. They demonstrated a relatively optimistic attitude, a strong degree of motivation, and moderate insight, as evidenced by his level of engagement combined with the degree to which the group topics seemed not to resonate with him. They seemed attentive and uninterested. They appeared to comprehend well the concepts being discussed. The patient seemed receptive of, and willing to implement, the ideas being discussed. Their thought process appeared linear and goal directed, and their speech was regular rate and rhythm.       Community Group Engagement:  No: not engaged    Reported relapse since last meeting? No: no lapse    .  Orders Only on 09/25/2023   Component Date Value Ref Range Status    External Amphetamine Screen Urine 09/25/2023 Positive (A)   Final    External Benzodiazepine Screen Uri* 09/25/2023 Positive (A)   Final    External Cocaine Screen Urine 09/25/2023 Negative   Final    External THC Screen Urine 09/25/2023 Negative   Final    External Methadone Screen Urine 09/25/2023 Negative   Final    External Methamphetamine Screen Ur* 09/25/2023 Negative   Final    External Oxycodone Screen Urine 09/25/2023 Negative   Final    External Buprenorphine Screen Urine 09/25/2023 Negative   Final    External MDMA 09/25/2023 Negative   Final    External Opiates Screen Urine 09/25/2023 Negative   Final   Orders Only on 09/21/2023   Component Date Value Ref Range  Status    External Amphetamine Screen Urine 09/21/2023 Positive (A)   Final    External Benzodiazepine Screen Uri* 09/21/2023 Positive (A)   Final    External Cocaine Screen Urine 09/21/2023 Negative   Final    External THC Screen Urine 09/21/2023 Negative   Final    External Methadone Screen Urine 09/21/2023 Negative   Final    External Methamphetamine Screen Ur* 09/21/2023 Negative   Final    External Oxycodone Screen Urine 09/21/2023 Negative   Final    External Buprenorphine Screen Urine 09/21/2023 Negative   Final    External MDMA 09/21/2023 Negative   Final    External Opiates Screen Urine 09/21/2023 Negative   Final   Lab on 09/21/2023   Component Date Value Ref Range Status    Ethanol 09/21/2023 <10  0 - 10 mg/dL Final    Ethanol % 09/21/2023 <0.010  % Final    Glucose 09/21/2023 109 (H)  65 - 99 mg/dL Final    BUN 09/21/2023 11  6 - 20 mg/dL Final    Creatinine 09/21/2023 0.85  0.76 - 1.27 mg/dL Final    Sodium 09/21/2023 140  136 - 145 mmol/L Final    Potassium 09/21/2023 4.4  3.5 - 5.2 mmol/L Final    Chloride 09/21/2023 105  98 - 107 mmol/L Final    CO2 09/21/2023 22.9  22.0 - 29.0 mmol/L Final    Calcium 09/21/2023 9.9  8.6 - 10.5 mg/dL Final    Total Protein 09/21/2023 7.8  6.0 - 8.5 g/dL Final    Albumin 09/21/2023 5.2  3.5 - 5.2 g/dL Final    ALT (SGPT) 09/21/2023 138 (H)  1 - 41 U/L Final    AST (SGOT) 09/21/2023 53 (H)  1 - 40 U/L Final    Alkaline Phosphatase 09/21/2023 113  39 - 117 U/L Final    Total Bilirubin 09/21/2023 0.5  0.0 - 1.2 mg/dL Final    Globulin 09/21/2023 2.6  gm/dL Final    A/G Ratio 09/21/2023 2.0  g/dL Final    BUN/Creatinine Ratio 09/21/2023 12.9  7.0 - 25.0 Final    Anion Gap 09/21/2023 12.1  5.0 - 15.0 mmol/L Final    eGFR 09/21/2023 122.9  >60.0 mL/min/1.73 Final   Orders Only on 09/18/2023   Component Date Value Ref Range Status    External Amphetamine Screen Urine 09/18/2023 Positive (A)   Final    External Benzodiazepine Screen Uri* 09/18/2023 Positive (A)   Final     External Cocaine Screen Urine 09/18/2023 Negative   Final    External THC Screen Urine 09/18/2023 Negative   Final    External Methadone Screen Urine 09/18/2023 Negative   Final    External Methamphetamine Screen Ur* 09/18/2023 Negative   Final    External Oxycodone Screen Urine 09/18/2023 Negative   Final    External Buprenorphine Screen Urine 09/18/2023 Negative   Final    External MDMA 09/18/2023 Negative   Final    External Opiates Screen Urine 09/18/2023 Negative   Final       Impression/Formulation:    ICD-10-CM ICD-9-CM   1. Alcohol use disorder, severe, dependence  F10.20 303.90   2. Bipolar affective disorder, mixed  F31.60 296.60        CLINICAL MANEUVERING/INTERVENTIONS: Therapist utilized a person-centered approach to build and maintain rapport with group member.   Therapist promoted safe nonjudgmental environment by providing group members with unconditional positive regard.  Assisted member in processing above session content; acknowledged and normalized patient's thoughts, feelings and concerns.  Allowed patient to freely discuss issues without interruption or judgment. Provided safe, confidential environment to facilitate the development of positive therapeutic relationship and encourage open, honest communication. Therapist assisted group member with identifying and implementing healthier coping strategies and maintaining healthier boundaries. Assisted patient in identifying risk factors which would indicate the need for higher level of care including thoughts to harm self or others and/or self-harming behavior and encouraged patient to contact this office, call 911, or present to the nearest emergency room should any of these events occur. Pt agreeable to adhere to medication regimen as prescribed and report any side effects.  Discussed crisis intervention services and means to access.  Patient adamantly and convincingly denies current suicidal or homicidal ideation or perceptual disturbance.       Therapist implemented motivational interviewing techniques to assist client with exploring and resolving ambivalence associated with commitment to change behaviors.  Yes  Therapist utilized dialectical behavior techniques to teach and model emotional regulation and relaxation.  Yes   Therapist applied cognitive behavioral strategies to facilitate identification of maladaptive patterns of thinking and behavior.  Yes     PLAN:    Continue [facility] CD PHP Phase  na  Aftercare:  [facility] Outpatient Phase 2      Please note that portions of this note were completed with a voice recognition program. Efforts were made to edit dictation, but occasionally words are mistranscribed.     This document signed by Mulugeta Santos LCSW, October 2, 2023, 15:05 EDT

## 2023-10-03 ENCOUNTER — OFFICE VISIT (OUTPATIENT)
Dept: PSYCHIATRY | Facility: HOSPITAL | Age: 28
End: 2023-10-03
Payer: MEDICARE

## 2023-10-03 DIAGNOSIS — F31.60 BIPOLAR AFFECTIVE DISORDER, MIXED: ICD-10-CM

## 2023-10-03 DIAGNOSIS — F10.20 ALCOHOL USE DISORDER, SEVERE, DEPENDENCE: Primary | ICD-10-CM

## 2023-10-03 PROCEDURE — H0035 MH PARTIAL HOSP TX UNDER 24H: HCPCS | Performed by: SOCIAL WORKER

## 2023-10-03 NOTE — PROGRESS NOTES
NAME: Eduardo Cannon  DATE: 10/03/2023    [facility] Phase  na  [id number]    Services Provided    Group Psychotherapy x 2  Education/Training x 2  Activity Therapy  x 0  Individual Therapy x 0 0 minutes  Family Therapy x 0  MD Initial Visit  x 0  MD Follow-Up   x 0    Number of participants: 4    DATA:  Patient reported having done all the opposite things from the sleep hygiene worksheet yesterday. He hadn't done this intentionally, but recognized afterward that he had consumed caffeine and nicotine before bed, had watched television in bed, and had stayed awake in bed too long. (Patient left after the third hour, reporting he was exhausted today. He also had to work tonight).     Individual: No    Homework: None assigned    Group 1 (Psychotherapy: Check-ins): Therapist continued facilitation of rapport building strategies between group members. Therapist asked that each patient check in with home life and recovery efforts and identify triggers, cravings, and high risk situations that arise between group sessions.  Group members discussed barriers and benefits of attending recovery meetings.  Therapist provided empathy and support during group session. Daily Reading: None    Group 2  (Mindfulness) Viewed and discussed the video, A simple way to break a bad habit (2011) by [employer] with Dr. Kobe Shin on [employer].     Group 3 (Mindfulness) Viewed and discussed the video, Learn mindfulness meditation in seconds (NOT HOURS) 10 simple exercises (2019) by [employer] with Dr. Echeverria on [employer]. Participants completed an exposure hierarchy for mindfulness exercises.     Group 4 (Psychoeducation) This hour of group was facilitated by [facility].      ASSESSMENT:    Personal Assessment 0-10 Scale (10 worst)    Anxiety:  0 (reported at 1030 that his anxiety was at 7)  Depression:  0 (no comment)  Cravings: 0 (no comment)     MSE within normal limits? No Affect: somber and agitated  Mood: depressed and irritable  Pt  Response:  open/receptive  Engaged in activity/Process and self-disclosed: suboptimal  Applies today's group topics to self: suboptimal  Able to give and receive feedback: suboptimal  Demonstrated insightful thinking: occasional and suboptimal  Other pt response comments:  Patient was a positive participant. They demonstrated a relatively optimistic attitude, a moderate degree of motivation, and moderate insight, as evidenced by his lower level of engagement today combined with his level of exhaustion. They seemed interested and distracted. They appeared to comprehend well the concepts being discussed. The patient seemed doubtful of, and somewhat willing to implement, the ideas being discussed. Their thought process appeared linear and goal directed, and their speech was regular rate and rhythm.       Community Group Engagement:  No: not engaged    Reported relapse since last meeting? No: no lapse    .  Orders Only on 09/25/2023   Component Date Value Ref Range Status    External Amphetamine Screen Urine 09/25/2023 Positive (A)   Final    External Benzodiazepine Screen Uri* 09/25/2023 Positive (A)   Final    External Cocaine Screen Urine 09/25/2023 Negative   Final    External THC Screen Urine 09/25/2023 Negative   Final    External Methadone Screen Urine 09/25/2023 Negative   Final    External Methamphetamine Screen Ur* 09/25/2023 Negative   Final    External Oxycodone Screen Urine 09/25/2023 Negative   Final    External Buprenorphine Screen Urine 09/25/2023 Negative   Final    External MDMA 09/25/2023 Negative   Final    External Opiates Screen Urine 09/25/2023 Negative   Final   Orders Only on 09/21/2023   Component Date Value Ref Range Status    External Amphetamine Screen Urine 09/21/2023 Positive (A)   Final    External Benzodiazepine Screen Uri* 09/21/2023 Positive (A)   Final    External Cocaine Screen Urine 09/21/2023 Negative   Final    External THC Screen Urine 09/21/2023 Negative   Final    External  Methadone Screen Urine 09/21/2023 Negative   Final    External Methamphetamine Screen Ur* 09/21/2023 Negative   Final    External Oxycodone Screen Urine 09/21/2023 Negative   Final    External Buprenorphine Screen Urine 09/21/2023 Negative   Final    External MDMA 09/21/2023 Negative   Final    External Opiates Screen Urine 09/21/2023 Negative   Final   Lab on 09/21/2023   Component Date Value Ref Range Status    Ethanol 09/21/2023 <10  0 - 10 mg/dL Final    Ethanol % 09/21/2023 <0.010  % Final    Glucose 09/21/2023 109 (H)  65 - 99 mg/dL Final    BUN 09/21/2023 11  6 - 20 mg/dL Final    Creatinine 09/21/2023 0.85  0.76 - 1.27 mg/dL Final    Sodium 09/21/2023 140  136 - 145 mmol/L Final    Potassium 09/21/2023 4.4  3.5 - 5.2 mmol/L Final    Chloride 09/21/2023 105  98 - 107 mmol/L Final    CO2 09/21/2023 22.9  22.0 - 29.0 mmol/L Final    Calcium 09/21/2023 9.9  8.6 - 10.5 mg/dL Final    Total Protein 09/21/2023 7.8  6.0 - 8.5 g/dL Final    Albumin 09/21/2023 5.2  3.5 - 5.2 g/dL Final    ALT (SGPT) 09/21/2023 138 (H)  1 - 41 U/L Final    AST (SGOT) 09/21/2023 53 (H)  1 - 40 U/L Final    Alkaline Phosphatase 09/21/2023 113  39 - 117 U/L Final    Total Bilirubin 09/21/2023 0.5  0.0 - 1.2 mg/dL Final    Globulin 09/21/2023 2.6  gm/dL Final    A/G Ratio 09/21/2023 2.0  g/dL Final    BUN/Creatinine Ratio 09/21/2023 12.9  7.0 - 25.0 Final    Anion Gap 09/21/2023 12.1  5.0 - 15.0 mmol/L Final    eGFR 09/21/2023 122.9  >60.0 mL/min/1.73 Final   Orders Only on 09/18/2023   Component Date Value Ref Range Status    External Amphetamine Screen Urine 09/18/2023 Positive (A)   Final    External Benzodiazepine Screen Uri* 09/18/2023 Positive (A)   Final    External Cocaine Screen Urine 09/18/2023 Negative   Final    External THC Screen Urine 09/18/2023 Negative   Final    External Methadone Screen Urine 09/18/2023 Negative   Final    External Methamphetamine Screen Ur* 09/18/2023 Negative   Final    External Oxycodone Screen Urine  09/18/2023 Negative   Final    External Buprenorphine Screen Urine 09/18/2023 Negative   Final    External MDMA 09/18/2023 Negative   Final    External Opiates Screen Urine 09/18/2023 Negative   Final       Impression/Formulation:    ICD-10-CM ICD-9-CM   1. Alcohol use disorder, severe, dependence  F10.20 303.90   2. Bipolar affective disorder, mixed  F31.60 296.60        CLINICAL MANEUVERING/INTERVENTIONS: Therapist utilized a person-centered approach to build and maintain rapport with group member.   Therapist promoted safe nonjudgmental environment by providing group members with unconditional positive regard.  Assisted member in processing above session content; acknowledged and normalized patient's thoughts, feelings and concerns.  Allowed patient to freely discuss issues without interruption or judgment. Provided safe, confidential environment to facilitate the development of positive therapeutic relationship and encourage open, honest communication. Therapist assisted group member with identifying and implementing healthier coping strategies and maintaining healthier boundaries. Assisted patient in identifying risk factors which would indicate the need for higher level of care including thoughts to harm self or others and/or self-harming behavior and encouraged patient to contact this office, call 911, or present to the nearest emergency room should any of these events occur. Pt agreeable to adhere to medication regimen as prescribed and report any side effects.  Discussed crisis intervention services and means to access.  Patient adamantly and convincingly denies current suicidal or homicidal ideation or perceptual disturbance.      Therapist implemented motivational interviewing techniques to assist client with exploring and resolving ambivalence associated with commitment to change behaviors.  Yes  Therapist utilized dialectical behavior techniques to teach and model emotional regulation and relaxation.   No   Therapist applied cognitive behavioral strategies to facilitate identification of maladaptive patterns of thinking and behavior.  Yes     PLAN:    Continue [facility] CD PHP Phase  na  Aftercare:  [facility] [place]  Outpatient Phase 2     Please note that portions of this note were completed with a voice recognition program. Efforts were made to edit dictation, but occasionally words are mistranscribed.     This document signed by Mulugeta Santos LCSW, October 3, 2023, 14:33 EDT

## 2023-10-04 ENCOUNTER — OFFICE VISIT (OUTPATIENT)
Dept: PSYCHIATRY | Facility: HOSPITAL | Age: 28
End: 2023-10-04
Payer: MEDICARE

## 2023-10-04 DIAGNOSIS — F10.20 ALCOHOL USE DISORDER, SEVERE, DEPENDENCE: Primary | ICD-10-CM

## 2023-10-04 DIAGNOSIS — F31.60 BIPOLAR AFFECTIVE DISORDER, MIXED: ICD-10-CM

## 2023-10-04 PROCEDURE — H0035 MH PARTIAL HOSP TX UNDER 24H: HCPCS | Performed by: SOCIAL WORKER

## 2023-10-04 NOTE — PROGRESS NOTES
"     NAME: Eduardo Cannon  DATE: 09/22/2023      -- CD PHP --      Time:   [id number]     Services Received This Date:     X 1 Psychotherapy Group   X 2 Education/Training Group   X 0 Individual Psychotherapy   X 1 Activity Therapy Group   X 0 MD Services (Ongoing)   X 0 MD Services (Initial)         DATA:          Psychotherapy Group (Check-in):  Therapist asked that each patient share a summary of how they're doing, including progress towards therapy goals and any new stressors that may have occurred, as well as any items they wish to put on today's agenda. Therapist provided empathy and support.      Education/Training Group: Group members received education about \"Fair Fighting Rules.\" Therapist encouraged group members to share their thoughts and discuss this topic with one another. Therapist continued facilitation of group cohesiveness.     Patient Response:     Personal Assessment 0-10 Scale (10 worst)   Depression: 3  Anxiety: 5    Patient arrived in person and participated in therapy today. Patient shared that he is feeling better today than he has lately. Patient states that he's had less anger in the past few weeks. During conversation about fair fighting rules, patient states that he wants to work on taking a time out when arguments become unproductive.     ASSESSMENT:    Impression/Formulation:    ICD-10-CM ICD-9-CM   1. Alcohol use disorder, severe, dependence  F10.20 303.90   2. Bipolar affective disorder, mixed  F31.60 296.60         CLINICAL MANUVERING/INTERVENTIONS:  Therapist utilized a person-centered approach to build rapport with patient.  Therapist implemented motivational interviewing techniques to assist patient with exploring personal growth and change.   Therapist applied cognitive behavioral strategies to facilitate identification of maladaptive patterns of thinking and behavior.  Therapist employed group interaction activities to build rapport among group members, promote healthy " lifestyle, and emphasize improvement of symptoms.  Therapist promoted safe nonjudgmental environment by providing group members with unconditional positive regard and encouraging group members to comply with group rules and guidelines.  Therapist utilized dialectical behavior techniques to teach and model emotional regulation and relaxation.  Therapist assisted group member with identifying and implementing healthier coping strategies.  Group member was encouraged to make positive daily choices, and maintain healthy boundaries. Group format provides experiential learning of the benefit of sharing openly with others.     PLAN:  Continue [facility].  Aftercare:  Step down to IOP level of care     This document signed by Clara Patton [facility], October 4, 2023, 10:44 EDT

## 2023-10-04 NOTE — PROGRESS NOTES
"   NAME: Eduardo Cannon  DATE: 10/04/2023    [facility] Phase  na  [phone]    Services Provided    Group Psychotherapy x 2  Education/Training x 1  Activity Therapy  x 1  Individual Therapy x 0 0 minutes  Family Therapy x 0  MD Initial Visit  x 0  MD Follow-Up   x 0    Number of participants: 2    DATA:  Patient reported he was \"getting by\" today. He had slept a little yesterday. He still felt depressed today. He had been thinking last night and had begun to feel as if things were hopeless and pointless. He had been wondering if he would ever get better mentally. He didn't think he would. He expected not to live a long life. He was estimating 25 more years of \"this shit.\" He was basing these conclusions on his past experience, because it had seemed to indicate a steady decline. He didn't see a reason for it would change course now. The patient reported he could not accurately recall certain events from his past, to discern what was real and what was not. He did not share specifics about events from his past, and it was unclear if he was thinking of particular periods of time or particular circumstances.    He had been considering how insignificant humans are relative to the universe. He believed that humans were basically meaningless \"microscopic organisms\" in the grand scheme of things. The therapist engaged him in conversation about this, suggesting that the very fact that humans could even contemplate the meaning of existence might be evidence that there is something greater, some higher calling, some significance to life. He was open to considering this.     As group progressed, he shared about how he had chosen to reject violence (the irritability and apathy from the reading) because he had not wanted people to have the satisfaction of being \"right\" about him all along. He was referring to people who had labeled him as deviant in some way or another. The therapist also suggested that the meaning he was pursuing " "appeared to be freedom. He seemed to agree with this.     Individual: No    Homework: None assigned    Group 1 (Psychotherapy: Check-ins): Therapist continued facilitation of rapport building strategies between group members. Therapist asked that each patient check in with home life and recovery efforts and identify triggers, cravings, and high risk situations that arise between group sessions.  Group members discussed barriers and benefits of attending recovery meetings.  Therapist provided empathy and support during group session. Daily Reading: Man's Search for Meaning (1944) by Rafal Ferrer    Group 2  (Recreation Therapy) [employer]    Group 3 (Bibliotherapy) Read several paragraphs from Man's Search for Meaning. Facilitated discussion, prompting patients to explore their own efforts to find meaning in their lives.    Group 4 (Psychotherapy) This hour of group was facilitated by NEGRITO Gambino.      ASSESSMENT:    Personal Assessment 0-10 Scale (10 worst)    Anxiety:  5 (no comment)  Depression:  7 (thoughts that life was \"pointless\")  Cravings: 0 (no comment)     MSE within normal limits? No Affect: dysthymic Mood: depressed  Pt Response:  guarded  Engaged in activity/Process and self-disclosed: adequate  Applies today's group topics to self: adequate  Able to give and receive feedback: adequate  Demonstrated insightful thinking: inconsistent and moderate  Other pt response comments:  Patient was a positive participant. They demonstrated a relatively pessimistic attitude, a mild degree of motivation, and moderate insight, as evidenced by his level of engagement combined with his willingness to share about feelings of hopelessness. His level of hopelessness appeared to decrease as the group session progressed. They seemed interested and attentive. They appeared to comprehend well the concepts being discussed. The patient seemed receptive of, and somewhat willing to implement, the ideas being discussed. " Their thought process appeared linear and goal directed, and their speech was regular rate and rhythm. It seemed likely the patient's consistent lack of sleep was contributing to his mood, but he was hesitant to attribute his mood to this to any significant degree, asserting that he had felt this way prior to the sleep deprivation.       Community Group Engagement:  No: not engaged    Reported relapse since last meeting? No: no lapse reported    .  Orders Only on 09/25/2023   Component Date Value Ref Range Status    External Amphetamine Screen Urine 09/25/2023 Positive (A)   Final    External Benzodiazepine Screen Uri* 09/25/2023 Positive (A)   Final    External Cocaine Screen Urine 09/25/2023 Negative   Final    External THC Screen Urine 09/25/2023 Negative   Final    External Methadone Screen Urine 09/25/2023 Negative   Final    External Methamphetamine Screen Ur* 09/25/2023 Negative   Final    External Oxycodone Screen Urine 09/25/2023 Negative   Final    External Buprenorphine Screen Urine 09/25/2023 Negative   Final    External MDMA 09/25/2023 Negative   Final    External Opiates Screen Urine 09/25/2023 Negative   Final   Orders Only on 09/21/2023   Component Date Value Ref Range Status    External Amphetamine Screen Urine 09/21/2023 Positive (A)   Final    External Benzodiazepine Screen Uri* 09/21/2023 Positive (A)   Final    External Cocaine Screen Urine 09/21/2023 Negative   Final    External THC Screen Urine 09/21/2023 Negative   Final    External Methadone Screen Urine 09/21/2023 Negative   Final    External Methamphetamine Screen Ur* 09/21/2023 Negative   Final    External Oxycodone Screen Urine 09/21/2023 Negative   Final    External Buprenorphine Screen Urine 09/21/2023 Negative   Final    External MDMA 09/21/2023 Negative   Final    External Opiates Screen Urine 09/21/2023 Negative   Final   Lab on 09/21/2023   Component Date Value Ref Range Status    Ethanol 09/21/2023 <10  0 - 10 mg/dL Final     Ethanol % 09/21/2023 <0.010  % Final    Glucose 09/21/2023 109 (H)  65 - 99 mg/dL Final    BUN 09/21/2023 11  6 - 20 mg/dL Final    Creatinine 09/21/2023 0.85  0.76 - 1.27 mg/dL Final    Sodium 09/21/2023 140  136 - 145 mmol/L Final    Potassium 09/21/2023 4.4  3.5 - 5.2 mmol/L Final    Chloride 09/21/2023 105  98 - 107 mmol/L Final    CO2 09/21/2023 22.9  22.0 - 29.0 mmol/L Final    Calcium 09/21/2023 9.9  8.6 - 10.5 mg/dL Final    Total Protein 09/21/2023 7.8  6.0 - 8.5 g/dL Final    Albumin 09/21/2023 5.2  3.5 - 5.2 g/dL Final    ALT (SGPT) 09/21/2023 138 (H)  1 - 41 U/L Final    AST (SGOT) 09/21/2023 53 (H)  1 - 40 U/L Final    Alkaline Phosphatase 09/21/2023 113  39 - 117 U/L Final    Total Bilirubin 09/21/2023 0.5  0.0 - 1.2 mg/dL Final    Globulin 09/21/2023 2.6  gm/dL Final    A/G Ratio 09/21/2023 2.0  g/dL Final    BUN/Creatinine Ratio 09/21/2023 12.9  7.0 - 25.0 Final    Anion Gap 09/21/2023 12.1  5.0 - 15.0 mmol/L Final    eGFR 09/21/2023 122.9  >60.0 mL/min/1.73 Final   Orders Only on 09/18/2023   Component Date Value Ref Range Status    External Amphetamine Screen Urine 09/18/2023 Positive (A)   Final    External Benzodiazepine Screen Uri* 09/18/2023 Positive (A)   Final    External Cocaine Screen Urine 09/18/2023 Negative   Final    External THC Screen Urine 09/18/2023 Negative   Final    External Methadone Screen Urine 09/18/2023 Negative   Final    External Methamphetamine Screen Ur* 09/18/2023 Negative   Final    External Oxycodone Screen Urine 09/18/2023 Negative   Final    External Buprenorphine Screen Urine 09/18/2023 Negative   Final    External MDMA 09/18/2023 Negative   Final    External Opiates Screen Urine 09/18/2023 Negative   Final       Impression/Formulation:    ICD-10-CM ICD-9-CM   1. Alcohol use disorder, severe, dependence  F10.20 303.90   2. Bipolar affective disorder, mixed  F31.60 296.60        CLINICAL MANEUVERING/INTERVENTIONS: Therapist utilized a person-centered approach to  build and maintain rapport with group member.   Therapist promoted safe nonjudgmental environment by providing group members with unconditional positive regard.  Assisted member in processing above session content; acknowledged and normalized patient's thoughts, feelings and concerns.  Allowed patient to freely discuss issues without interruption or judgment. Provided safe, confidential environment to facilitate the development of positive therapeutic relationship and encourage open, honest communication. Therapist assisted group member with identifying and implementing healthier coping strategies and maintaining healthier boundaries. Assisted patient in identifying risk factors which would indicate the need for higher level of care including thoughts to harm self or others and/or self-harming behavior and encouraged patient to contact this office, call 911, or present to the nearest emergency room should any of these events occur. Pt agreeable to adhere to medication regimen as prescribed and report any side effects.  Discussed crisis intervention services and means to access.  Patient adamantly and convincingly denies current suicidal or homicidal ideation or perceptual disturbance.      Therapist implemented motivational interviewing techniques to assist client with exploring and resolving ambivalence associated with commitment to change behaviors.  Yes  Therapist utilized dialectical behavior techniques to teach and model emotional regulation and relaxation.  No   Therapist applied cognitive behavioral strategies to facilitate identification of maladaptive patterns of thinking and behavior.  Yes     PLAN:    Continue [facility] PHP Phase  na  Aftercare:  [facility]  Outpatient Phase 2      Please note that portions of this note were completed with a voice recognition program. Efforts were made to edit dictation, but occasionally words are mistranscribed.     This document signed by  Mulugeta Santos, [facility], October 4, 2023, 08:58 EDT

## 2023-10-05 ENCOUNTER — OFFICE VISIT (OUTPATIENT)
Dept: PSYCHIATRY | Facility: HOSPITAL | Age: 28
End: 2023-10-05
Payer: MEDICARE

## 2023-10-05 DIAGNOSIS — F31.60 BIPOLAR AFFECTIVE DISORDER, MIXED: ICD-10-CM

## 2023-10-05 DIAGNOSIS — F10.20 ALCOHOL USE DISORDER, SEVERE, DEPENDENCE: Primary | ICD-10-CM

## 2023-10-05 DIAGNOSIS — Z79.899 MEDICATION MANAGEMENT: Primary | ICD-10-CM

## 2023-10-05 PROCEDURE — H0035 MH PARTIAL HOSP TX UNDER 24H: HCPCS | Performed by: SOCIAL WORKER

## 2023-10-05 RX ORDER — RISPERIDONE 1 MG/1
TABLET ORAL
Qty: 90 TABLET | Refills: 0 | Status: SHIPPED | OUTPATIENT
Start: 2023-10-05
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
## 2023-10-05 NOTE — PROGRESS NOTES
[facility]  [address] [place], [place] [id number]    Partial Hospitalization Program for Chemical Dependency (CD PHP)      Initial Individualized Treatment Plan       Long Term Goal:  Eduardo will establish a sustained recovery and will maintain total abstinence from mind-altering substances other than what is prescribed and/or approved by the attending physician. Pt will learn, practice, and utilize behavioral and cognitive coping skills to help maintain sobriety.    Patient Care Needs:  Eduardo presents with alcohol use disorder and is at high risk for relapse without continued treatment.  Pt has a history of using drugs/alcohol until intoxicated or passed out and has been unable to stop or cut down use of alcohol/drugs once starting, despite verbalized desire to do so, and the negative consequences from continued use.  Pt's use has negatively impacted mental, social, occupational, and/or physical functioning. He also desired to stop gambling, which had been occurring more often lately.     Program Goals:    1.  Patient will participate in a medical evaluation to assess mental health and will cooperate with an evaluation by the attending psychiatrist or psychiatric nurse practitioner for psychotropic medication if appropriate.        2.  Patient will adhere to the PHP group rules.      3.  Patient will attend group sessions as scheduled. Patient will notify therapist and support staff of any absences or tardies. Patient will provide a valid and verifiable excuse for absences to be considered excused.  When present patient will participate by giving and receiving feedback appropriately.    4. Patient will participate in random drug and alcohol screenings and will exhibit negative results on said screenings.    5.  Patient will identify high risk situations, people and places to avoid to maintain sobriety.    6. Patient will develop a positive network of support by attending a minimum  of two recovery/spiritual support groups each week (two outside of IOP group) and by exploring, obtaining or maintaining a sponsor or sober support person.      7. Patient will identify, practice, and implement at least 5 healthy coping strategies to manage difficult emotions while remaining abstinent.    8.  Patient will encourage family participation in education sessions, if appropriate.    9.  Patient will exhibit increased motivation to change as assessed by observable behaviors in conjunction with the ASAM assessment tool.  Patient will utilize healthy coping skills to manage depressive and anxious symptoms and will exhibit decreased score on the PHQ-9 and KADEEM-7.     Individualized Goals:    1. Goal Progress:    1. Patient participated in an evaluation for medication management with Dr. Upton. Patient is meeting with medical provider as scheduled for medication management and follow-up.            2.  Patient read and signed PHP Group Rules. Patient is adhering to group rules.    3.   Patient has 0 unexcused absences. Pt is attentive, engaged and participating in group.                    4.  Patient has displayed no positive results on drug and alcohol screenings for the last two weeks.        5.  Patient has encountered the following high-risk situations since beginning treatment: gambling across the street from work.     6.  Patient is not attending recovery/spiritual support group meetings at least 1 times per week. Patient does not have a sponsor/sober support person and is not working the 12 steps.    7.  Patient has identified and implemented the following coping strategies during high risk situations: He had been dieting a little better. He was also playing the tape to the end, especially when it came to gambling.          8.  Declines family participation to date.          9.   ASAM, KADEEM and PHQ updates as of 9/28:       KADEEM: 15  PHQ: 15    ASAM Dimensions:  I.    Intoxication/Withdrawal:  0  (no  risk)  II.   Medical Conditions/Complications:  0 (no known conditions)  III.  Behavioral/Emotional/Cognitive: 2 (anger and depression)  IV.  Readiness to Change: 1 (fair motivation)  V.   Relapse Risk: 1 (gambling is highest risk)  VI:  Recovery Environment: 1 (gambling accessible near work)  Total ASAM Score = 5                     Goal Progress:    1. Ongoing Goal:    1.  Partially completed.  Patient participated initial evaluation for medication management with Dr. Upton.  Patient will continue participating in as scheduled medication management and follow-up appointments with psychiatric medical provider.    2.  Patient will continue working toward objective.       3.  Patient will continue working toward objective.                         4.  Patient will continue working toward objective.          5.  Patient will continue working toward objective.        6.  Patient and therapist will continue to encourage family participation as appropriate.                7. Patient will continue working toward objective.              8. Patient will continue working toward objective, as appropriate.      9.  Patient will continue working toward objective.                        Ongoing Goal:    1.        Next Target Date: NA, graduation next Monday    Team Members:  Patient - Eduardo Cannon  Medical Provider - Dr. Padilla Upton  [facility] Therapist - Mulugeta Santos LCSW  [facility] Director - Tucker Valero LCSW, [facility]  Support Staff

## 2023-10-05 NOTE — PROGRESS NOTES
NAME: Eduardo Cannon  DATE: 10/05/2023    [facility] Phase  na  [phone]    Services Provided    Group Psychotherapy x 2  Education/Training x 2  Activity Therapy  x 0  Individual Therapy x 0 0 minutes  Family Therapy x 0  MD Initial Visit  x 0  MD Follow-Up   x 1    Number of participants: 4    DATA:  Patient reported he was doing pretty good. He was feeling better than yesterday, other than being tired still. He supposed last night at work had been less boring. He supposed the medication was helping, too.     Individual: Yes, describe: Updated treatment plan. Patient reported he felt ready for graduation next Monday. He was pleased with the progress he had made so far. Therapist printed a copy of his most recent drug screen confirmation. He reported that gambling was still a problem. As an effort to mitigate the risk of gambling behavior, he agreed that a budget would be a good idea. He planned to put all his dollars to work (a zero-sum budget) for specific purposes.     Homework: Assigned Budget, Personal Recovery Plan    Group 1 (Psychotherapy: Check-ins): Therapist continued facilitation of rapport building strategies between group members. Therapist asked that each patient check in with home life and recovery efforts and identify triggers, cravings, and high risk situations that arise between group sessions.  Group members discussed barriers and benefits of attending recovery meetings.  Therapist provided empathy and support during group session. Daily Reading: None    Group 2  (12 Steps) Discussed the concept of reservations from the  12 Step Working Guide. Facilitated discussion about participants' reservations.      Group 3 (Psychoeducation) This hour of group was facilitated by DEBRA Quintana.    Group 4 (Psychotherapy) This hour of group was facilitated by NEGRITO Gambino.     ASSESSMENT:    Personal Assessment 0-10 Scale (10 worst)    Anxiety:  2 (no comment)  Depression:  0 (not feeling hopeless  today, but not entirely hopeful either)  Cravings: 0 (no comment)     MSE within normal limits? Yes Affect: somber Mood: calm  Pt Response:  open/receptive  Engaged in activity/Process and self-disclosed: adequate  Applies today's group topics to self: adequate  Able to give and receive feedback: adequate  Demonstrated insightful thinking: consistent and adequate  Other pt response comments:  Patient was a positive participant. They demonstrated a relatively positive attitude, a moderate degree of motivation, and adequate insight, as evidenced by his level of engagement combined with his improved attitude. They seemed interested and attentive. They appeared to comprehend well the concepts being discussed. The patient seemed receptive of, and willing to implement, the ideas being discussed. Their thought process appeared linear and goal directed, and their speech was regular rate and rhythm.       Community Group Engagement:  No: not engaged    Reported relapse since last meeting? No: no lapse    .  Orders Only on 10/05/2023   Component Date Value Ref Range Status    External Amphetamine Screen Urine 10/02/2023 Negative   Final-Edited    External Benzodiazepine Screen Uri* 10/02/2023 Negative   Final-Edited    External Cocaine Screen Urine 10/02/2023 Negative   Final-Edited    External THC Screen Urine 10/02/2023 Negative   Final-Edited    External Methadone Screen Urine 10/02/2023 Negative   Final-Edited    External Methamphetamine Screen Ur* 10/02/2023 Negative   Final-Edited    External Oxycodone Screen Urine 10/02/2023 Negative   Final-Edited    External Buprenorphine Screen Urine 10/02/2023 Negative   Final-Edited    External MDMA 10/02/2023 Negative   Final-Edited    External Opiates Screen Urine 10/02/2023 Negative   Final-Edited   Orders Only on 09/25/2023   Component Date Value Ref Range Status    External Amphetamine Screen Urine 09/25/2023 Positive (A)   Final    External Benzodiazepine Screen Uri*  09/25/2023 Positive (A)   Final    External Cocaine Screen Urine 09/25/2023 Negative   Final    External THC Screen Urine 09/25/2023 Negative   Final    External Methadone Screen Urine 09/25/2023 Negative   Final    External Methamphetamine Screen Ur* 09/25/2023 Negative   Final    External Oxycodone Screen Urine 09/25/2023 Negative   Final    External Buprenorphine Screen Urine 09/25/2023 Negative   Final    External MDMA 09/25/2023 Negative   Final    External Opiates Screen Urine 09/25/2023 Negative   Final   Orders Only on 09/21/2023   Component Date Value Ref Range Status    External Amphetamine Screen Urine 09/21/2023 Positive (A)   Final    External Benzodiazepine Screen Uri* 09/21/2023 Positive (A)   Final    External Cocaine Screen Urine 09/21/2023 Negative   Final    External THC Screen Urine 09/21/2023 Negative   Final    External Methadone Screen Urine 09/21/2023 Negative   Final    External Methamphetamine Screen Ur* 09/21/2023 Negative   Final    External Oxycodone Screen Urine 09/21/2023 Negative   Final    External Buprenorphine Screen Urine 09/21/2023 Negative   Final    External MDMA 09/21/2023 Negative   Final    External Opiates Screen Urine 09/21/2023 Negative   Final   Lab on 09/21/2023   Component Date Value Ref Range Status    Ethanol 09/21/2023 <10  0 - 10 mg/dL Final    Ethanol % 09/21/2023 <0.010  % Final    Glucose 09/21/2023 109 (H)  65 - 99 mg/dL Final    BUN 09/21/2023 11  6 - 20 mg/dL Final    Creatinine 09/21/2023 0.85  0.76 - 1.27 mg/dL Final    Sodium 09/21/2023 140  136 - 145 mmol/L Final    Potassium 09/21/2023 4.4  3.5 - 5.2 mmol/L Final    Chloride 09/21/2023 105  98 - 107 mmol/L Final    CO2 09/21/2023 22.9  22.0 - 29.0 mmol/L Final    Calcium 09/21/2023 9.9  8.6 - 10.5 mg/dL Final    Total Protein 09/21/2023 7.8  6.0 - 8.5 g/dL Final    Albumin 09/21/2023 5.2  3.5 - 5.2 g/dL Final    ALT (SGPT) 09/21/2023 138 (H)  1 - 41 U/L Final    AST (SGOT) 09/21/2023 53 (H)  1 - 40 U/L  Final    Alkaline Phosphatase 09/21/2023 113  39 - 117 U/L Final    Total Bilirubin 09/21/2023 0.5  0.0 - 1.2 mg/dL Final    Globulin 09/21/2023 2.6  gm/dL Final    A/G Ratio 09/21/2023 2.0  g/dL Final    BUN/Creatinine Ratio 09/21/2023 12.9  7.0 - 25.0 Final    Anion Gap 09/21/2023 12.1  5.0 - 15.0 mmol/L Final    eGFR 09/21/2023 122.9  >60.0 mL/min/1.73 Final   Orders Only on 09/18/2023   Component Date Value Ref Range Status    External Amphetamine Screen Urine 09/18/2023 Positive (A)   Final    External Benzodiazepine Screen Uri* 09/18/2023 Positive (A)   Final    External Cocaine Screen Urine 09/18/2023 Negative   Final    External THC Screen Urine 09/18/2023 Negative   Final    External Methadone Screen Urine 09/18/2023 Negative   Final    External Methamphetamine Screen Ur* 09/18/2023 Negative   Final    External Oxycodone Screen Urine 09/18/2023 Negative   Final    External Buprenorphine Screen Urine 09/18/2023 Negative   Final    External MDMA 09/18/2023 Negative   Final    External Opiates Screen Urine 09/18/2023 Negative   Final       Impression/Formulation:    ICD-10-CM ICD-9-CM   1. Alcohol use disorder, severe, dependence  F10.20 303.90   2. Bipolar affective disorder, mixed  F31.60 296.60        CLINICAL MANEUVERING/INTERVENTIONS: Therapist utilized a person-centered approach to build and maintain rapport with group member.   Therapist promoted safe nonjudgmental environment by providing group members with unconditional positive regard.  Assisted member in processing above session content; acknowledged and normalized patient's thoughts, feelings and concerns.  Allowed patient to freely discuss issues without interruption or judgment. Provided safe, confidential environment to facilitate the development of positive therapeutic relationship and encourage open, honest communication. Therapist assisted group member with identifying and implementing healthier coping strategies and maintaining healthier  boundaries. Assisted patient in identifying risk factors which would indicate the need for higher level of care including thoughts to harm self or others and/or self-harming behavior and encouraged patient to contact this office, call 911, or present to the nearest emergency room should any of these events occur. Pt agreeable to adhere to medication regimen as prescribed and report any side effects.  Discussed crisis intervention services and means to access.  Patient adamantly and convincingly denies current suicidal or homicidal ideation or perceptual disturbance.      Therapist implemented motivational interviewing techniques to assist client with exploring and resolving ambivalence associated with commitment to change behaviors.  Yes  Therapist utilized dialectical behavior techniques to teach and model emotional regulation and relaxation.  No   Therapist applied cognitive behavioral strategies to facilitate identification of maladaptive patterns of thinking and behavior.  Yes     PLAN:    Continue [facility] CD PHP Phase  na  Aftercare:  [facility] CD Outpatient Phase 2      Please note that portions of this note were completed with a voice recognition program. Efforts were made to edit dictation, but occasionally words are mistranscribed.     This document signed by Mulugeta Santos LCSW, October 5, 2023, 15:11 EDT

## 2023-10-06 ENCOUNTER — OFFICE VISIT (OUTPATIENT)
Dept: PSYCHIATRY | Facility: HOSPITAL | Age: 28
End: 2023-10-06
Payer: MEDICARE

## 2023-10-06 DIAGNOSIS — F31.60 BIPOLAR AFFECTIVE DISORDER, MIXED: ICD-10-CM

## 2023-10-06 DIAGNOSIS — F10.20 ALCOHOL USE DISORDER, SEVERE, DEPENDENCE: Primary | ICD-10-CM

## 2023-10-06 PROCEDURE — H0035 MH PARTIAL HOSP TX UNDER 24H: HCPCS

## 2023-10-06 NOTE — PROGRESS NOTES
"Adult PHP Group      Date: October 4, 2023  Time: 6924-1098    Type of Group:  Psychoeducation    Group  Content: Therapist facilitated group therapy session focused on avoiding personalization. Group members watched a TedTalk titled \"How Not to Take Things Personally.\"     Patient Response: Patient reports he does struggle with personalization. He reports his tendency to take things personally has been reinforced by past experiences. Patient specifically discusses situations when others are whispering and looking at him in public. Patient reports he has struggles with jumping to conclusions. He was a positive participant and receptive to topic.         Nelia Hendrix  10/06/23  17:55 EDT    "

## 2023-10-09 ENCOUNTER — OFFICE VISIT (OUTPATIENT)
Dept: PSYCHIATRY | Facility: HOSPITAL | Age: 28
End: 2023-10-09
Payer: MEDICARE

## 2023-10-09 DIAGNOSIS — F10.20 ALCOHOL USE DISORDER, SEVERE, DEPENDENCE: Primary | ICD-10-CM

## 2023-10-09 DIAGNOSIS — F31.60 BIPOLAR AFFECTIVE DISORDER, MIXED: ICD-10-CM

## 2023-10-09 PROCEDURE — H0035 MH PARTIAL HOSP TX UNDER 24H: HCPCS | Performed by: SOCIAL WORKER

## 2023-10-09 NOTE — PROGRESS NOTES
NAME: Eduardo Cannon  DATE: 10/09/2023    [facility] Phase  na  [id number]    Services Provided    Group Psychotherapy x 2  Education/Training x 2  Activity Therapy  x 0  Individual Therapy x 0 0 minutes  Family Therapy x 0  MD Initial Visit  x 0  MD Follow-Up   x 0    Number of participants: 6    DATA:  Patient reported he was tired today. He had drank some pre-workout this morning, but he felt this was a mistake, because now he was jittery. Regarding feelings of hopelessness, he reported they had passed. He attributed this to the medication he was on. It was working the way he wanted, but he also found it to be annoying. It was a weird feeling being virtually unable to feel angry or depressed. He could only feel angry in his head, but he couldn't feel it in his body.     Individual: No    Homework: returned completed his personal recovery plan in the parking lot this morning and created a rough budget     Group 1 (Psychotherapy: Check-ins): Therapist continued facilitation of rapport building strategies between group members. Therapist asked that each patient check in with home life and recovery efforts and identify triggers, cravings, and high risk situations that arise between group sessions.  Group members discussed barriers and benefits of attending recovery meetings.  Therapist provided empathy and support during group session. Daily Reading: None    Group 2  (CBT) Socratic Questions worksheet and Royce [employer] Ed video.     Group 3 (Personal Recovery Plan) Participants listened and engaged in conversation as a peer shared their personal recovery plan on the day of their graduation.    Group 4 (DBT) Facilitated discussion utilizing the Wise Mind and Distress Tolerance worksheets from [employer]rapist [email or web address].      ASSESSMENT:    Personal Assessment 0-10 Scale (10 worst)    Anxiety:  5 (no comment)  Depression:  0 (boredom 7)  Cravings: 0 (no comment)     MSE within normal limits? Yes Affect: agitated  Mood:   elevated  Pt Response:  ambivalent  Engaged in activity/Process and self-disclosed: suboptimal  Applies today's group topics to self: suboptimal  Able to give and receive feedback: suboptimal  Demonstrated insightful thinking: inconsistent and suboptimal  Other pt response comments:  Patient was a positive participant. They demonstrated a relatively optimistic attitude, a moderate degree of motivation, and moderate insight, as evidenced by his level of engagement combined with his difficulty staying on topic today. They seemed interested and distracted. They appeared to comprehend well the concepts being discussed. The patient seemed receptive of, and willing to implement, the ideas being discussed. Their thought process appeared circumstantial , and their speech was pressured.       Community Group Engagement:  No: declines    Reported relapse since last meeting? No: none reported    .  Orders Only on 10/05/2023   Component Date Value Ref Range Status    External Amphetamine Screen Urine 10/02/2023 Negative   Final-Edited    External Benzodiazepine Screen Uri* 10/02/2023 Negative   Final-Edited    External Cocaine Screen Urine 10/02/2023 Negative   Final-Edited    External THC Screen Urine 10/02/2023 Negative   Final-Edited    External Methadone Screen Urine 10/02/2023 Negative   Final-Edited    External Methamphetamine Screen Ur* 10/02/2023 Negative   Final-Edited    External Oxycodone Screen Urine 10/02/2023 Negative   Final-Edited    External Buprenorphine Screen Urine 10/02/2023 Negative   Final-Edited    External MDMA 10/02/2023 Negative   Final-Edited    External Opiates Screen Urine 10/02/2023 Negative   Final-Edited    External Amphetamine Screen Urine 10/04/2023 Negative   Final    External Benzodiazepine Screen Uri* 10/04/2023 Negative   Final    External Cocaine Screen Urine 10/04/2023 Negative   Final    External THC Screen Urine 10/04/2023 Negative   Final    External Methadone Screen Urine 10/04/2023  Negative   Final    External Methamphetamine Screen Ur* 10/04/2023 Negative   Final    External Oxycodone Screen Urine 10/04/2023 Negative   Final    External Buprenorphine Screen Urine 10/04/2023 Negative   Final    External MDMA 10/04/2023 Negative   Final    External Opiates Screen Urine 10/04/2023 Negative   Final   Orders Only on 09/25/2023   Component Date Value Ref Range Status    External Amphetamine Screen Urine 09/25/2023 Positive (A)   Final    External Benzodiazepine Screen Uri* 09/25/2023 Positive (A)   Final    External Cocaine Screen Urine 09/25/2023 Negative   Final    External THC Screen Urine 09/25/2023 Negative   Final    External Methadone Screen Urine 09/25/2023 Negative   Final    External Methamphetamine Screen Ur* 09/25/2023 Negative   Final    External Oxycodone Screen Urine 09/25/2023 Negative   Final    External Buprenorphine Screen Urine 09/25/2023 Negative   Final    External MDMA 09/25/2023 Negative   Final    External Opiates Screen Urine 09/25/2023 Negative   Final   Orders Only on 09/21/2023   Component Date Value Ref Range Status    External Amphetamine Screen Urine 09/21/2023 Positive (A)   Final    External Benzodiazepine Screen Uri* 09/21/2023 Positive (A)   Final    External Cocaine Screen Urine 09/21/2023 Negative   Final    External THC Screen Urine 09/21/2023 Negative   Final    External Methadone Screen Urine 09/21/2023 Negative   Final    External Methamphetamine Screen Ur* 09/21/2023 Negative   Final    External Oxycodone Screen Urine 09/21/2023 Negative   Final    External Buprenorphine Screen Urine 09/21/2023 Negative   Final    External MDMA 09/21/2023 Negative   Final    External Opiates Screen Urine 09/21/2023 Negative   Final   Lab on 09/21/2023   Component Date Value Ref Range Status    Ethanol 09/21/2023 <10  0 - 10 mg/dL Final    Ethanol % 09/21/2023 <0.010  % Final    Glucose 09/21/2023 109 (H)  65 - 99 mg/dL Final    BUN 09/21/2023 11  6 - 20 mg/dL Final     Creatinine 09/21/2023 0.85  0.76 - 1.27 mg/dL Final    Sodium 09/21/2023 140  136 - 145 mmol/L Final    Potassium 09/21/2023 4.4  3.5 - 5.2 mmol/L Final    Chloride 09/21/2023 105  98 - 107 mmol/L Final    CO2 09/21/2023 22.9  22.0 - 29.0 mmol/L Final    Calcium 09/21/2023 9.9  8.6 - 10.5 mg/dL Final    Total Protein 09/21/2023 7.8  6.0 - 8.5 g/dL Final    Albumin 09/21/2023 5.2  3.5 - 5.2 g/dL Final    ALT (SGPT) 09/21/2023 138 (H)  1 - 41 U/L Final    AST (SGOT) 09/21/2023 53 (H)  1 - 40 U/L Final    Alkaline Phosphatase 09/21/2023 113  39 - 117 U/L Final    Total Bilirubin 09/21/2023 0.5  0.0 - 1.2 mg/dL Final    Globulin 09/21/2023 2.6  gm/dL Final    A/G Ratio 09/21/2023 2.0  g/dL Final    BUN/Creatinine Ratio 09/21/2023 12.9  7.0 - 25.0 Final    Anion Gap 09/21/2023 12.1  5.0 - 15.0 mmol/L Final    eGFR 09/21/2023 122.9  >60.0 mL/min/1.73 Final       Impression/Formulation:    ICD-10-CM ICD-9-CM   1. Alcohol use disorder, severe, dependence  F10.20 303.90   2. Bipolar affective disorder, mixed  F31.60 296.60        CLINICAL MANEUVERING/INTERVENTIONS: Therapist utilized a person-centered approach to build and maintain rapport with group member.   Therapist promoted safe nonjudgmental environment by providing group members with unconditional positive regard.  Assisted member in processing above session content; acknowledged and normalized patient's thoughts, feelings and concerns.  Allowed patient to freely discuss issues without interruption or judgment. Provided safe, confidential environment to facilitate the development of positive therapeutic relationship and encourage open, honest communication. Therapist assisted group member with identifying and implementing healthier coping strategies and maintaining healthier boundaries. Assisted patient in identifying risk factors which would indicate the need for higher level of care including thoughts to harm self or others and/or self-harming behavior and  encouraged patient to contact this office, call 911, or present to the nearest emergency room should any of these events occur. Pt agreeable to adhere to medication regimen as prescribed and report any side effects.  Discussed crisis intervention services and means to access.  Patient adamantly and convincingly denies current suicidal or homicidal ideation or perceptual disturbance.      Therapist implemented motivational interviewing techniques to assist client with exploring and resolving ambivalence associated with commitment to change behaviors.  Yes  Therapist utilized dialectical behavior techniques to teach and model emotional regulation and relaxation.  No   Therapist applied cognitive behavioral strategies to facilitate identification of maladaptive patterns of thinking and behavior.  Yes     PLAN:    Continue [facility] PHP Phase  na  Aftercare:  [facility] Outpatient Phase 2      Please note that portions of this note were completed with a voice recognition program. Efforts were made to edit dictation, but occasionally words are mistranscribed.     This document signed by Mulugeta Santos LCSW, October 9, 2023, 14:53 EDT     1.87

## 2023-10-12 ENCOUNTER — OFFICE VISIT (OUTPATIENT)
Dept: PSYCHIATRY | Facility: CLINIC | Age: 28
End: 2023-10-12
Payer: MEDICARE

## 2023-10-12 DIAGNOSIS — F10.20 ALCOHOL USE DISORDER, SEVERE, DEPENDENCE: Primary | ICD-10-CM

## 2023-10-12 DIAGNOSIS — Z79.899 MEDICATION MANAGEMENT: Primary | ICD-10-CM

## 2023-10-12 DIAGNOSIS — F31.60 BIPOLAR AFFECTIVE DISORDER, MIXED: ICD-10-CM

## 2023-10-12 LAB
EXTERNAL AMPHETAMINE SCREEN URINE: POSITIVE
EXTERNAL BENZODIAZEPINE SCREEN URINE: NEGATIVE
EXTERNAL BUPRENORPHINE SCREEN URINE: NEGATIVE
EXTERNAL COCAINE SCREEN URINE: NEGATIVE
EXTERNAL MDMA: NEGATIVE
EXTERNAL METHADONE SCREEN URINE: NEGATIVE
EXTERNAL METHAMPHETAMINE SCREEN URINE: NEGATIVE
EXTERNAL OPIATES SCREEN URINE: NEGATIVE
EXTERNAL OXYCODONE SCREEN URINE: NEGATIVE
EXTERNAL THC SCREEN URINE: NEGATIVE

## 2023-10-16 NOTE — PROGRESS NOTES
NAME: Eduardo Cannon  DATE: 10/06/2023    -- CD PHP --     Time:   [id number]    Services Received This Date:    X 2 Psychotherapy Group   X 1 Education/Training Group   X 0 Individual Psychotherapy   X 1 Activity Therapy Group   X 0 MD Services (Ongoing)   X 0 MD Services (Initial)       DATA:          Psychotherapy Group (Check-in):  Therapist asked that each patient share a summary of how they're doing, including progress towards therapy goals and any new stressors that may have occurred, as well as any items they wish to put on today's agenda. Therapist provided empathy and support.     Psychotherapy Group: This  group focused on further exploring topics mentioned at check ins. Patients are given opportunities to build interpersonal confidence, practice communication skills, and receive empathic understanding from others. Therapist encourages group members to share and respond to one another about their experience with this.     Patient Response:     Personal Assessment 0-10 Scale (10 worst)   Depression: 1  Anxiety: 3    Patient arrived in person and participated in therapy today. Patient shared that he isn't having any cravings today. He stated that he is feeling better today. Patient remained quiet during group sessions.     ASSESSMENT:    Impression/Formulation:    ICD-10-CM ICD-9-CM   1. Alcohol use disorder, severe, dependence  F10.20 303.90   2. Bipolar affective disorder, mixed  F31.60 296.60         CLINICAL MANUVERING/INTERVENTIONS:  Therapist utilized a person-centered approach to build rapport with patient.  Therapist implemented motivational interviewing techniques to assist patient with exploring personal growth and change.   Therapist applied cognitive behavioral strategies to facilitate identification of maladaptive patterns of thinking and behavior.  Therapist employed group interaction activities to build rapport among group members, promote healthy lifestyle, and emphasize improvement of  symptoms.  Therapist promoted safe nonjudgmental environment by providing group members with unconditional positive regard and encouraging group members to comply with group rules and guidelines.  Therapist utilized dialectical behavior techniques to teach and model emotional regulation and relaxation.  Therapist assisted group member with identifying and implementing healthier coping strategies.  Group member was encouraged to make positive daily choices, and maintain healthy boundaries. Group format provides experiential learning of the benefit of sharing openly with others.     PLAN:  Continue [facility].  Aftercare:  Step down to IOP level of care     This document signed by Clara Patton [facility] Hospital[facility], October 16, 2023, 12:20 EDT

## 2023-10-17 ENCOUNTER — OFFICE VISIT (OUTPATIENT)
Dept: PSYCHIATRY | Facility: CLINIC | Age: 28
End: 2023-10-17
Payer: MEDICARE

## 2023-10-17 DIAGNOSIS — Z79.899 MEDICATION MANAGEMENT: Primary | ICD-10-CM

## 2023-10-17 DIAGNOSIS — F10.20 ALCOHOL USE DISORDER, SEVERE, DEPENDENCE: Primary | ICD-10-CM

## 2023-10-17 DIAGNOSIS — F31.60 BIPOLAR AFFECTIVE DISORDER, MIXED: ICD-10-CM

## 2023-10-17 PROCEDURE — 90853 GROUP PSYCHOTHERAPY: CPT | Performed by: SOCIAL WORKER

## 2023-10-17 NOTE — PROGRESS NOTES
NAME: Eduardo Cannon  DATE: 10/17/2023    [facility] Phase II  [id number]    Services Provided    Group Psychotherapy x 1  Education/Training x 0  Activity Therapy  x 0  Individual Therapy x 0 0 minutes  Family Therapy x 0  MD Initial Visit  x 0  MD Follow-Up   x 0    Number of participants: 5    DATA:  Patient reported he was pretty good. He still hadn't been able to get angry, even though he wanted to. He guessed he was starting to get used to it. He reported his anxiety had been higher today since he had awoken around 10:00.     Individual: No    Homework: None assigned    Group 1 (Psychotherapy: Check-ins): Therapist continued facilitation of rapport building strategies between group members. Therapist asked that each patient check in with home life and recovery efforts and identify triggers, cravings, and high risk situations that arise between group sessions.  Group members discussed barriers and benefits of attending recovery meetings.  Therapist provided empathy and support during group session. Daily Reading:  Just for Today     ASSESSMENT:    Personal Assessment 0-10 Scale (10 worst)    Anxiety:  6 (no comment)  Depression:  2 (no comment)  Cravings: 0 (no comment)     MSE within normal limits? Yes Affect: somber Mood: calm  Pt Response:  open/receptive  Engaged in activity/Process and self-disclosed: suboptimal  Applies today's group topics to self: suboptimal  Able to give and receive feedback: suboptimal  Demonstrated insightful thinking: occasional and suboptimal  Other pt response comments:  Patient was a positive participant. They demonstrated a relatively positive attitude, a moderate degree of motivation, and moderate insight, as evidenced by his level of engagement combined with his efforts to resist the urge to murray. They seemed interested and attentive. They appeared to comprehend well the concepts being discussed. The patient seemed receptive of, and willing to implement, the ideas being  discussed. Their thought process appeared linear and goal directed, and their speech was regular rate and rhythm.       Community Group Engagement:  No: not engaged    Reported relapse since last meeting? No: no lapse    .  Orders Only on 10/12/2023   Component Date Value Ref Range Status    External Amphetamine Screen Urine 10/12/2023 Positive (A)   Final    External Benzodiazepine Screen Uri* 10/12/2023 Negative   Final    External Cocaine Screen Urine 10/12/2023 Negative   Final    External THC Screen Urine 10/12/2023 Negative   Final    External Methadone Screen Urine 10/12/2023 Negative   Final    External Methamphetamine Screen Ur* 10/12/2023 Negative   Final    External Oxycodone Screen Urine 10/12/2023 Negative   Final    External Buprenorphine Screen Urine 10/12/2023 Negative   Final    External MDMA 10/12/2023 Negative   Final    External Opiates Screen Urine 10/12/2023 Negative   Final   Orders Only on 10/05/2023   Component Date Value Ref Range Status    External Amphetamine Screen Urine 10/02/2023 Negative   Final-Edited    External Benzodiazepine Screen Uri* 10/02/2023 Negative   Final-Edited    External Cocaine Screen Urine 10/02/2023 Negative   Final-Edited    External THC Screen Urine 10/02/2023 Negative   Final-Edited    External Methadone Screen Urine 10/02/2023 Negative   Final-Edited    External Methamphetamine Screen Ur* 10/02/2023 Negative   Final-Edited    External Oxycodone Screen Urine 10/02/2023 Negative   Final-Edited    External Buprenorphine Screen Urine 10/02/2023 Negative   Final-Edited    External MDMA 10/02/2023 Negative   Final-Edited    External Opiates Screen Urine 10/02/2023 Negative   Final-Edited    External Amphetamine Screen Urine 10/04/2023 Negative   Final    External Benzodiazepine Screen Uri* 10/04/2023 Negative   Final    External Cocaine Screen Urine 10/04/2023 Negative   Final    External THC Screen Urine 10/04/2023 Negative   Final    External Methadone Screen Urine  10/04/2023 Negative   Final    External Methamphetamine Screen Ur* 10/04/2023 Negative   Final    External Oxycodone Screen Urine 10/04/2023 Negative   Final    External Buprenorphine Screen Urine 10/04/2023 Negative   Final    External MDMA 10/04/2023 Negative   Final    External Opiates Screen Urine 10/04/2023 Negative   Final       Impression/Formulation:    ICD-10-CM ICD-9-CM   1. Alcohol use disorder, severe, dependence  F10.20 303.90   2. Bipolar affective disorder, mixed  F31.60 296.60        CLINICAL MANEUVERING/INTERVENTIONS: Therapist utilized a person-centered approach to build and maintain rapport with group member.   Therapist promoted safe nonjudgmental environment by providing group members with unconditional positive regard.  Assisted member in processing above session content; acknowledged and normalized patient's thoughts, feelings and concerns.  Allowed patient to freely discuss issues without interruption or judgment. Provided safe, confidential environment to facilitate the development of positive therapeutic relationship and encourage open, honest communication. Therapist assisted group member with identifying and implementing healthier coping strategies and maintaining healthier boundaries. Assisted patient in identifying risk factors which would indicate the need for higher level of care including thoughts to harm self or others and/or self-harming behavior and encouraged patient to contact this office, call 911, or present to the nearest emergency room should any of these events occur. Pt agreeable to adhere to medication regimen as prescribed and report any side effects.  Discussed crisis intervention services and means to access.  Patient adamantly and convincingly denies current suicidal or homicidal ideation or perceptual disturbance.      Therapist implemented motivational interviewing techniques to assist client with exploring and resolving ambivalence associated with commitment to  change behaviors.  Yes  Therapist utilized dialectical behavior techniques to teach and model emotional regulation and relaxation.  No   Therapist applied cognitive behavioral strategies to facilitate identification of maladaptive patterns of thinking and behavior.  Yes     [facility]  Aftercare:  [facility] CD Outpatient Phase 3     Please note that portions of this note were completed with a voice recognition program. Efforts were made to edit dictation, but occasionally words are mistranscribed.     This document signed by Mulugeta Santos LCSW, October 17, 2023, 14:34 EDT

## 2023-10-19 ENCOUNTER — OFFICE VISIT (OUTPATIENT)
Dept: PSYCHIATRY | Facility: CLINIC | Age: 28
End: 2023-10-19
Payer: MEDICARE

## 2023-10-19 DIAGNOSIS — F31.60 BIPOLAR AFFECTIVE DISORDER, MIXED: ICD-10-CM

## 2023-10-19 DIAGNOSIS — F10.20 ALCOHOL USE DISORDER, SEVERE, DEPENDENCE: Primary | ICD-10-CM

## 2023-10-19 PROCEDURE — 90853 GROUP PSYCHOTHERAPY: CPT | Performed by: SOCIAL WORKER

## 2023-10-19 NOTE — PROGRESS NOTES
NAME: Eduardo Cannon  DATE: 10/19/2023    [facility] Phase II  [id number]    Services Provided    Group Psychotherapy x 1  Education/Training x 0  Activity Therapy  x 0  Individual Therapy x 0 0 minutes  Family Therapy x 0  MD Initial Visit  x 0  MD Follow-Up   x 0    Number of participants: 4    DATA:  Patient reported he was doing pretty good today. He was pleased to report an upcoming interview with a [employer] on Monday. He reported believing this would be a better opportunity for him. Also, he was pleased to report that the medication seemed to be better balancing his mood. He was no longer experiencing such high highs and low lows. What's more, he believed he was getting used to the absence of anger in his life. He missed being angry, reporting it was rewarding in some way, but he concluded that the anger was rewarding in and of itself, rather that it being a means to an end. Perhaps this would be worth exploring further.     Individual: No    Homework: None assigned    Group 1 (Psychotherapy: Check-ins): Therapist continued facilitation of rapport building strategies between group members. Therapist asked that each patient check in with home life and recovery efforts and identify triggers, cravings, and high risk situations that arise between group sessions.  Group members discussed barriers and benefits of attending recovery meetings.  Therapist provided empathy and support during group session. Daily Reading: None     ASSESSMENT:    Personal Assessment 0-10 Scale (10 worst)    Anxiety:  3 (no comment)  Depression:  1 (no comment)  Cravings: 0 (no comment)     MSE within normal limits? Yes Affect: somber Mood: calm  Pt Response:  open/receptive  Engaged in activity/Process and self-disclosed: adequate  Applies today's group topics to self: adequate  Able to give and receive feedback: adequate  Demonstrated insightful thinking: consistent and adequate  Other pt response comments:  Patient was a positive  participant. They demonstrated a relatively positive attitude, a strong degree of motivation, and adequate insight, as evidenced by his level of engagement combined with his efforts to maintain relevant conversation. They seemed interested and attentive. They appeared to comprehend well the concepts being discussed. The patient seemed receptive of, and willing to implement, the ideas being discussed. Their thought process appeared linear and goal directed, and their speech was regular rate and rhythm.       Community Group Engagement:  No: not engaged    Reported relapse since last meeting? No: no lapse    .  Orders Only on 10/17/2023   Component Date Value Ref Range Status    External Amphetamine Screen Urine 10/17/2023 Positive (A)   Final    External Benzodiazepine Screen Uri* 10/17/2023 Negative   Final    External Cocaine Screen Urine 10/17/2023 Negative   Final    External THC Screen Urine 10/17/2023 Negative   Final    External Methadone Screen Urine 10/17/2023 Negative   Final    External Methamphetamine Screen Ur* 10/17/2023 Negative   Final    External Oxycodone Screen Urine 10/17/2023 Negative   Final    External Buprenorphine Screen Urine 10/17/2023 Negative   Final    External MDMA 10/17/2023 Negative   Final    External Opiates Screen Urine 10/17/2023 Negative   Final   Orders Only on 10/12/2023   Component Date Value Ref Range Status    External Amphetamine Screen Urine 10/12/2023 Positive (A)   Final    External Benzodiazepine Screen Uri* 10/12/2023 Negative   Final    External Cocaine Screen Urine 10/12/2023 Negative   Final    External THC Screen Urine 10/12/2023 Negative   Final    External Methadone Screen Urine 10/12/2023 Negative   Final    External Methamphetamine Screen Ur* 10/12/2023 Negative   Final    External Oxycodone Screen Urine 10/12/2023 Negative   Final    External Buprenorphine Screen Urine 10/12/2023 Negative   Final    External MDMA 10/12/2023 Negative   Final    External Opiates  Screen Urine 10/12/2023 Negative   Final   Orders Only on 10/05/2023   Component Date Value Ref Range Status    External Amphetamine Screen Urine 10/02/2023 Negative   Final-Edited    External Benzodiazepine Screen Uri* 10/02/2023 Negative   Final-Edited    External Cocaine Screen Urine 10/02/2023 Negative   Final-Edited    External THC Screen Urine 10/02/2023 Negative   Final-Edited    External Methadone Screen Urine 10/02/2023 Negative   Final-Edited    External Methamphetamine Screen Ur* 10/02/2023 Negative   Final-Edited    External Oxycodone Screen Urine 10/02/2023 Negative   Final-Edited    External Buprenorphine Screen Urine 10/02/2023 Negative   Final-Edited    External MDMA 10/02/2023 Negative   Final-Edited    External Opiates Screen Urine 10/02/2023 Negative   Final-Edited    External Amphetamine Screen Urine 10/04/2023 Negative   Final    External Benzodiazepine Screen Uri* 10/04/2023 Negative   Final    External Cocaine Screen Urine 10/04/2023 Negative   Final    External THC Screen Urine 10/04/2023 Negative   Final    External Methadone Screen Urine 10/04/2023 Negative   Final    External Methamphetamine Screen Ur* 10/04/2023 Negative   Final    External Oxycodone Screen Urine 10/04/2023 Negative   Final    External Buprenorphine Screen Urine 10/04/2023 Negative   Final    External MDMA 10/04/2023 Negative   Final    External Opiates Screen Urine 10/04/2023 Negative   Final       Impression/Formulation:    ICD-10-CM ICD-9-CM   1. Alcohol use disorder, severe, dependence  F10.20 303.90   2. Bipolar affective disorder, mixed  F31.60 296.60        CLINICAL MANEUVERING/INTERVENTIONS: Therapist utilized a person-centered approach to build and maintain rapport with group member.   Therapist promoted safe nonjudgmental environment by providing group members with unconditional positive regard.  Assisted member in processing above session content; acknowledged and normalized patient's thoughts, feelings and  concerns.  Allowed patient to freely discuss issues without interruption or judgment. Provided safe, confidential environment to facilitate the development of positive therapeutic relationship and encourage open, honest communication. Therapist assisted group member with identifying and implementing healthier coping strategies and maintaining healthier boundaries. Assisted patient in identifying risk factors which would indicate the need for higher level of care including thoughts to harm self or others and/or self-harming behavior and encouraged patient to contact this office, call 911, or present to the nearest emergency room should any of these events occur. Pt agreeable to adhere to medication regimen as prescribed and report any side effects.  Discussed crisis intervention services and means to access.  Patient adamantly and convincingly denies current suicidal or homicidal ideation or perceptual disturbance.      Therapist implemented motivational interviewing techniques to assist client with exploring and resolving ambivalence associated with commitment to change behaviors.  Yes  Therapist utilized dialectical behavior techniques to teach and model emotional regulation and relaxation.  No   Therapist applied cognitive behavioral strategies to facilitate identification of maladaptive patterns of thinking and behavior.  Yes     PLAN:    Continue [facility] IOP Phase II  Aftercare:  [facility] Outpatient Phase 3     Please note that portions of this note were completed with a voice recognition program. Efforts were made to edit dictation, but occasionally words are mistranscribed.     This document signed by Mulugeta Santos LCSW, October 19, 2023, 15:40 EDT

## 2023-10-20 NOTE — PROGRESS NOTES
NAME: Eduardo Cannon  DATE: 10/12/2023     IOP Phase II  [id number]     Services Provided     Group Psychotherapy x 1  Education/Training x 0  Activity Therapy  x 0  Individual Therapy x 0 0 minutes  Family Therapy x 0  MD Initial Visit  x 0  MD Follow-Up   x 0     DATA:  Patient reported that he is doing very well. Reports no depression, anxiety, or cravings for substances. Patient states that he is relieved to be done with Phase 1 of IOP because he is able to catch up on some sleep now that his schedule is more manageable.      Individual: No     Homework: None assigned     Group 1 (Psychotherapy: Check-ins): Therapist asked that each patient check in with home life and recovery efforts and identify triggers, cravings, and high risk situations that arise between group sessions.  Group members discussed barriers and benefits of engaging more in patient's community.  Therapist provided empathy and support during group session.     ASSESSMENT:    Impression/Formulation:    ICD-10-CM ICD-9-CM   1. Alcohol use disorder, severe, dependence  F10.20 303.90   2. Bipolar affective disorder, mixed  F31.60 296.60       Personal Assessment 0-10 Scale (10 worst)     Anxiety:  0 (no comment)  Depression:  0 (no comment)  Cravings: 0 (no comment)     MSE within normal limits? yes Affect: euthymic Mood: calm     Reported relapse since last meeting? No        CLINICAL MANEUVERING/INTERVENTIONS: Therapist utilized a person-centered approach to build and maintain rapport with group member.   Therapist promoted safe nonjudgmental environment by providing group members with unconditional positive regard.  Assisted member in processing above session content; acknowledged and normalized patient's thoughts, feelings and concerns.  Allowed patient to freely discuss issues without interruption or judgment. Provided safe, confidential environment to facilitate the development of positive therapeutic relationship and encourage open,  honest communication. Therapist assisted group member with identifying and implementing healthier coping strategies and maintaining healthier boundaries. Assisted patient in identifying risk factors which would indicate the need for higher level of care including thoughts to harm self or others and/or self-harming behavior and encouraged patient to contact this office, call 911, or present to the nearest emergency room should any of these events occur. Pt agreeable to adhere to medication regimen as prescribed and report any side effects.  Discussed crisis intervention services and means to access.  Patient adamantly and convincingly denies current suicidal or homicidal ideation or perceptual disturbance.       Therapist implemented motivational interviewing techniques to assist client with exploring and resolving ambivalence associated with commitment to change behaviors.  Yes  Therapist applied cognitive behavioral strategies to facilitate identification of maladaptive patterns of thinking and behavior.  Yes      PLAN:    Continue [facility] IOP Phase II  Aftercare:  [facility] Outpatient Phase 3     This document signed by JUANITA Sanchez, October 20, 2023, 09:33 EDT

## 2023-10-31 ENCOUNTER — OFFICE VISIT (OUTPATIENT)
Dept: PSYCHIATRY | Facility: CLINIC | Age: 28
End: 2023-10-31
Payer: MEDICARE

## 2023-10-31 DIAGNOSIS — Z79.899 MEDICATION MANAGEMENT: ICD-10-CM

## 2023-10-31 DIAGNOSIS — F10.20 ALCOHOL USE DISORDER, SEVERE, DEPENDENCE: Primary | ICD-10-CM

## 2023-10-31 DIAGNOSIS — F31.60 BIPOLAR AFFECTIVE DISORDER, MIXED: ICD-10-CM

## 2023-10-31 PROCEDURE — 90853 GROUP PSYCHOTHERAPY: CPT | Performed by: SOCIAL WORKER

## 2023-10-31 NOTE — PROGRESS NOTES
NAME: Eduardo Cannon  DATE: 10/31/2023     IOP Phase I  [id number]    Services Provided    Group Psychotherapy x 1  Education/Training x 0  Activity Therapy  x 0  Individual Therapy x 0 0 minutes  Family Therapy x 0  MD Initial Visit  x 0  MD Follow-Up   x 0    Number of participants: 3    DATA:  Patient reported he was feeling better this week after having recovered from COVID. Certain things were still tasting strange to him. He had spent this entire last week laying in bed. Regarding his positive screen for alcohol on the 17th, he denied having relapsed. He supposed Nyquil had resulted in the positive results.    Individual: No    Homework: None assigned    Group 1 (Psychotherapy: Check-ins): Therapist continued facilitation of rapport building strategies between group members. Therapist asked that each patient check in with home life and recovery efforts and identify triggers, cravings, and high risk situations that arise between group sessions.  Group members discussed barriers and benefits of attending recovery meetings.  Therapist provided empathy and support during group session. Daily Reading: None     ASSESSMENT:    Personal Assessment 0-10 Scale (10 worst)    Anxiety:  3 (no comment)  Depression:  1 (no comment)  Cravings: 1 (no comment)     MSE within normal limits? Yes Affect: somber Mood: depressed  Pt Response:  open/receptive  Engaged in activity/Process and self-disclosed: suboptimal  Applies today's group topics to self: suboptimal  Able to give and receive feedback: suboptimal  Demonstrated insightful thinking: occasional and suboptimal  Other pt response comments:  Patient was a positive participant. They demonstrated a relatively optimistic attitude, a strong degree of motivation, and moderate insight, as evidenced by his level of engagement combined with his insights. They seemed interested and attentive. They appeared to comprehend well the concepts being discussed. The patient seemed  receptive of, and willing to implement, the ideas being discussed. Their thought process appeared tangential , and their speech was regular rate and rhythm.       Community Group Engagement:  No: not engaged    Reported relapse since last meeting? No: no lapse reported    .  Office Visit on 10/31/2023   Component Date Value Ref Range Status    External Amphetamine Screen Urine 10/31/2023 Positive (A)   Final    External Benzodiazepine Screen Uri* 10/31/2023 Negative   Final    External Cocaine Screen Urine 10/31/2023 Negative   Final    External THC Screen Urine 10/31/2023 Negative   Final    External Methadone Screen Urine 10/31/2023 Negative   Final    External Methamphetamine Screen Ur* 10/31/2023 Negative   Final    External Oxycodone Screen Urine 10/31/2023 Negative   Final    External Buprenorphine Screen Urine 10/31/2023 Negative   Final    External MDMA 10/31/2023 Negative   Final    External Opiates Screen Urine 10/31/2023 Negative   Final   Orders Only on 10/17/2023   Component Date Value Ref Range Status    External Amphetamine Screen Urine 10/17/2023 Positive (A)   Final    External Benzodiazepine Screen Uri* 10/17/2023 Negative   Final    External Cocaine Screen Urine 10/17/2023 Negative   Final    External THC Screen Urine 10/17/2023 Negative   Final    External Methadone Screen Urine 10/17/2023 Negative   Final    External Methamphetamine Screen Ur* 10/17/2023 Negative   Final    External Oxycodone Screen Urine 10/17/2023 Negative   Final    External Buprenorphine Screen Urine 10/17/2023 Negative   Final    External MDMA 10/17/2023 Negative   Final    External Opiates Screen Urine 10/17/2023 Negative   Final   Orders Only on 10/12/2023   Component Date Value Ref Range Status    External Amphetamine Screen Urine 10/12/2023 Positive (A)   Final    External Benzodiazepine Screen Uri* 10/12/2023 Negative   Final    External Cocaine Screen Urine 10/12/2023 Negative   Final    External THC Screen Urine  10/12/2023 Negative   Final    External Methadone Screen Urine 10/12/2023 Negative   Final    External Methamphetamine Screen Ur* 10/12/2023 Negative   Final    External Oxycodone Screen Urine 10/12/2023 Negative   Final    External Buprenorphine Screen Urine 10/12/2023 Negative   Final    External MDMA 10/12/2023 Negative   Final    External Opiates Screen Urine 10/12/2023 Negative   Final       Impression/Formulation:    ICD-10-CM ICD-9-CM   1. Alcohol use disorder, severe, dependence  F10.20 303.90   2. Bipolar affective disorder, mixed  F31.60 296.60   3. Medication management  Z79.899 V58.69        CLINICAL MANEUVERING/INTERVENTIONS: Therapist utilized a person-centered approach to build and maintain rapport with group member.   Therapist promoted safe nonjudgmental environment by providing group members with unconditional positive regard.  Assisted member in processing above session content; acknowledged and normalized patient's thoughts, feelings and concerns.  Allowed patient to freely discuss issues without interruption or judgment. Provided safe, confidential environment to facilitate the development of positive therapeutic relationship and encourage open, honest communication. Therapist assisted group member with identifying and implementing healthier coping strategies and maintaining healthier boundaries. Assisted patient in identifying risk factors which would indicate the need for higher level of care including thoughts to harm self or others and/or self-harming behavior and encouraged patient to contact this office, call 911, or present to the nearest emergency room should any of these events occur. Pt agreeable to adhere to medication regimen as prescribed and report any side effects.  Discussed crisis intervention services and means to access.  Patient adamantly and convincingly denies current suicidal or homicidal ideation or perceptual disturbance.      Therapist implemented motivational  interviewing techniques to assist client with exploring and resolving ambivalence associated with commitment to change behaviors.  Yes  Therapist utilized dialectical behavior techniques to teach and model emotional regulation and relaxation.  No   Therapist applied cognitive behavioral strategies to facilitate identification of maladaptive patterns of thinking and behavior.  Yes     PLAN:    Continue [facility] IOP Phase II  Aftercare:  [facility] Outpatient Phase 3     Please note that portions of this note were completed with a voice recognition program. Efforts were made to edit dictation, but occasionally words are mistranscribed.     This document signed by Mulugeta Santos LCSW, October 31, 2023, 14:54 EDT

## 2023-11-02 ENCOUNTER — OFFICE VISIT (OUTPATIENT)
Dept: PSYCHIATRY | Facility: CLINIC | Age: 28
End: 2023-11-02
Payer: MEDICARE

## 2023-11-02 DIAGNOSIS — F31.60 BIPOLAR AFFECTIVE DISORDER, MIXED: ICD-10-CM

## 2023-11-02 DIAGNOSIS — F10.20 ALCOHOL USE DISORDER, SEVERE, DEPENDENCE: Primary | ICD-10-CM

## 2023-11-02 PROCEDURE — 90853 GROUP PSYCHOTHERAPY: CPT | Performed by: SOCIAL WORKER

## 2023-11-02 NOTE — PROGRESS NOTES
NAME: Eduardo Cannon  DATE: 2023    [facility] Phase II  [id number]    Services Provided    Group Psychotherapy x 1  Education/Training x 0  Activity Therapy  x 0  Individual Therapy x 0 0 minutes  Family Therapy x 0  MD Initial Visit  x 0  MD Follow-Up   x 0    Number of participants: 3    DATA:  Patient reported he was doing pretty good today. The other day, however, he found himself feeling depressed to the point of suicidal thoughts. This was on Tuesday some time after group. He had begun to think that whatever he did it would be pointless. He slept a while and felt better after he awoke. He denied suicidal thoughts today. He supposed the medication was helping. He hadn't felt this way in two months, and this mood episode left him faster than it had in the past (it used to be weeks that those mood episodes would last). He saw this as a great improvement. There seemed to be little risk of harm to self at this time. He was hesitant to pursue hospital admission this past Tuesday because he feared losing his job and being unable to pay for his house arrest ankle bracelet.     Individual: No    Homework: None assigned    Group 1 (Psychotherapy: Check-ins): Therapist continued facilitation of rapport building strategies between group members. Therapist asked that each patient check in with home life and recovery efforts and identify triggers, cravings, and high risk situations that arise between group sessions.  Group members discussed barriers and benefits of attending recovery meetings.  Therapist provided empathy and support during group session. Daily Readin Rules for Life (2018) by Varun Robert Pursue what is meaningful, not what is expedient.      ASSESSMENT:    Personal Assessment 0-10 Scale (10 worst)    Anxiety:  3 (no comment)  Depression:  1 (no comment)  Cravings: 0 (no comment)     MSE within normal limits? Yes Affect: somber Mood: depressed  Pt Response:  open/receptive  Engaged in  activity/Process and self-disclosed: adequate  Applies today's group topics to self: adequate  Able to give and receive feedback: adequate  Demonstrated insightful thinking: inconsistent and suboptimal  Other pt response comments:  Patient was a positive participant. They demonstrated a relatively optimistic attitude, a moderate degree of motivation, and moderate insight, as evidenced by his level of engagement combined with his hesitancy to pursue hospital admission in the face of suicidal thoughts two days prior. They seemed interested and attentive. They appeared to comprehend well the concepts being discussed. The patient seemed unreceptive of, and somewhat willing to implement, the ideas being discussed. Their thought process appeared linear and goal directed, and their speech was regular rate and rhythm.       Community Group Engagement:  No: not interested    Reported relapse since last meeting? No: no lapse for nearly two months     .  Office Visit on 10/31/2023   Component Date Value Ref Range Status    External Amphetamine Screen Urine 10/31/2023 Positive (A)   Final    External Benzodiazepine Screen Uri* 10/31/2023 Negative   Final    External Cocaine Screen Urine 10/31/2023 Negative   Final    External THC Screen Urine 10/31/2023 Negative   Final    External Methadone Screen Urine 10/31/2023 Negative   Final    External Methamphetamine Screen Ur* 10/31/2023 Negative   Final    External Oxycodone Screen Urine 10/31/2023 Negative   Final    External Buprenorphine Screen Urine 10/31/2023 Negative   Final    External MDMA 10/31/2023 Negative   Final    External Opiates Screen Urine 10/31/2023 Negative   Final   Orders Only on 10/17/2023   Component Date Value Ref Range Status    External Amphetamine Screen Urine 10/17/2023 Positive (A)   Final    External Benzodiazepine Screen Uri* 10/17/2023 Negative   Final    External Cocaine Screen Urine 10/17/2023 Negative   Final    External THC Screen Urine 10/17/2023  Negative   Final    External Methadone Screen Urine 10/17/2023 Negative   Final    External Methamphetamine Screen Ur* 10/17/2023 Negative   Final    External Oxycodone Screen Urine 10/17/2023 Negative   Final    External Buprenorphine Screen Urine 10/17/2023 Negative   Final    External MDMA 10/17/2023 Negative   Final    External Opiates Screen Urine 10/17/2023 Negative   Final       Impression/Formulation:    ICD-10-CM ICD-9-CM   1. Alcohol use disorder, severe, dependence  F10.20 303.90   2. Bipolar affective disorder, mixed  F31.60 296.60        CLINICAL MANEUVERING/INTERVENTIONS: Therapist utilized a person-centered approach to build and maintain rapport with group member.   Therapist promoted safe nonjudgmental environment by providing group members with unconditional positive regard.  Assisted member in processing above session content; acknowledged and normalized patient's thoughts, feelings and concerns.  Allowed patient to freely discuss issues without interruption or judgment. Provided safe, confidential environment to facilitate the development of positive therapeutic relationship and encourage open, honest communication. Therapist assisted group member with identifying and implementing healthier coping strategies and maintaining healthier boundaries. Assisted patient in identifying risk factors which would indicate the need for higher level of care including thoughts to harm self or others and/or self-harming behavior and encouraged patient to contact this office, call 911, or present to the nearest emergency room should any of these events occur. Pt agreeable to adhere to medication regimen as prescribed and report any side effects.  Discussed crisis intervention services and means to access.  Patient adamantly and convincingly denies current suicidal or homicidal ideation or perceptual disturbance.      Therapist implemented motivational interviewing techniques to assist client with exploring and  resolving ambivalence associated with commitment to change behaviors.  Yes  Therapist utilized dialectical behavior techniques to teach and model emotional regulation and relaxation.  No   Therapist applied cognitive behavioral strategies to facilitate identification of maladaptive patterns of thinking and behavior.  Yes     PLAN:    Continue [facility] IOP Phase II  Aftercare:  [facility] Phase 3     Please note that portions of this note were completed with a voice recognition program. Efforts were made to edit dictation, but occasionally words are mistranscribed.     This document signed by Mulugeta Santos LCSW, November 2, 2023, 14:16 EDT

## 2023-11-07 ENCOUNTER — OFFICE VISIT (OUTPATIENT)
Dept: PSYCHIATRY | Facility: CLINIC | Age: 28
End: 2023-11-07
Payer: MEDICARE

## 2023-11-07 DIAGNOSIS — Z79.899 MEDICATION MANAGEMENT: Primary | ICD-10-CM

## 2023-11-07 DIAGNOSIS — F10.20 ALCOHOL USE DISORDER, SEVERE, DEPENDENCE: Primary | ICD-10-CM

## 2023-11-07 DIAGNOSIS — F31.60 BIPOLAR AFFECTIVE DISORDER, MIXED: ICD-10-CM

## 2023-11-07 PROCEDURE — 90853 GROUP PSYCHOTHERAPY: CPT | Performed by: SOCIAL WORKER
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
## 2023-11-08 ENCOUNTER — OFFICE VISIT (OUTPATIENT)
Dept: PSYCHIATRY | Facility: CLINIC | Age: 28
End: 2023-11-08
Payer: MEDICARE

## 2023-11-08 VITALS
OXYGEN SATURATION: 98 % | DIASTOLIC BLOOD PRESSURE: 86 MMHG | BODY MASS INDEX: 31.47 KG/M2 | WEIGHT: 219.8 LBS | HEIGHT: 70 IN | HEART RATE: 100 BPM | SYSTOLIC BLOOD PRESSURE: 130 MMHG

## 2023-11-08 DIAGNOSIS — Z79.899 MEDICATION MANAGEMENT: ICD-10-CM

## 2023-11-08 DIAGNOSIS — F31.60 BIPOLAR AFFECTIVE DISORDER, MIXED: Primary | ICD-10-CM

## 2023-11-08 DIAGNOSIS — F10.20 ALCOHOL USE DISORDER, SEVERE, DEPENDENCE: ICD-10-CM

## 2023-11-08 PROCEDURE — 99214 OFFICE O/P EST MOD 30 MIN: CPT | Performed by: NURSE PRACTITIONER

## 2023-11-08 PROCEDURE — 1160F RVW MEDS BY RX/DR IN RCRD: CPT | Performed by: NURSE PRACTITIONER

## 2023-11-08 PROCEDURE — 1159F MED LIST DOCD IN RCRD: CPT | Performed by: NURSE PRACTITIONER

## 2023-11-08 RX ORDER — RISPERIDONE 4 MG/1
4 TABLET ORAL DAILY
Qty: 30 TABLET | Refills: 1 | Status: SHIPPED | OUTPATIENT
Start: 2023-11-08

## 2023-11-08 RX ORDER — DEXTROAMPHETAMINE SACCHARATE, AMPHETAMINE ASPARTATE, DEXTROAMPHETAMINE SULFATE AND AMPHETAMINE SULFATE 5; 5; 5; 5 MG/1; MG/1; MG/1; MG/1
20 TABLET ORAL DAILY
COMMUNITY
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
## 2023-11-09 ENCOUNTER — OFFICE VISIT (OUTPATIENT)
Dept: PSYCHIATRY | Facility: CLINIC | Age: 28
End: 2023-11-09
Payer: MEDICARE

## 2023-11-09 DIAGNOSIS — F10.20 ALCOHOL USE DISORDER, SEVERE, DEPENDENCE: ICD-10-CM

## 2023-11-09 DIAGNOSIS — Z79.899 MEDICATION MANAGEMENT: Primary | ICD-10-CM

## 2023-11-09 DIAGNOSIS — F31.60 BIPOLAR AFFECTIVE DISORDER, MIXED: Primary | ICD-10-CM

## 2023-11-09 PROCEDURE — 90853 GROUP PSYCHOTHERAPY: CPT | Performed by: SOCIAL WORKER

## 2023-11-09 NOTE — PROGRESS NOTES
"   NAME: Eduardo Cannon  DATE: 11/09/2023    [facility] Phase II  [id number]    Services Provided    Group Psychotherapy x 1  Education/Training x 0  Activity Therapy  x 0  Individual Therapy x 0 0 minutes  Family Therapy x 0  MD Initial Visit  x 0  MD Follow-Up   x 0    Number of participants: 3    DATA:  Patient reported he was feeling alright, but he wanted to say he was feeling \"pretty shitty.\" He explained that he gambled last night and lost about $400. This had been his first time gambling in over a month. Patient reported he was angry after this happened. It was the angriest he had been in a long time. On a scale from 1-10 he scored last night's anger at 5.     Individual: No    Homework: None assigned    Group 1 (Psychotherapy: Check-ins): Therapist continued facilitation of rapport building strategies between group members. Therapist asked that each patient check in with home life and recovery efforts and identify triggers, cravings, and high risk situations that arise between group sessions.  Group members discussed barriers and benefits of attending recovery meetings.  Therapist provided empathy and support during group session. Daily Reading: None     ASSESSMENT:    Personal Assessment 0-10 Scale (10 worst)    Anxiety:  2 (no comment)  Depression:  1 (no comment)  Cravings: 1 (no comment)     MSE within normal limits? No Affect: flat Mood: irritable  Pt Response:  open/receptive   Engaged in activity/Process and self-disclosed: suboptimal  Applies today's group topics to self: suboptimal  Able to give and receive feedback: suboptimal  Demonstrated insightful thinking: inconsistent and suboptimal  Other pt response comments:  Patient was a positive participant. They demonstrated a relatively pessimistic attitude, a moderate degree of motivation, and moderate insight, as evidenced by his level of self-disclosure about recent gambling behavior combined with his level of frustration toward certain leslie-on in " the world today. They seemed interested and attentive. They appeared to comprehend well the concepts being discussed. The patient seemed doubtful of, and somewhat willing to implement, the ideas being discussed. Their thought process appeared circumstantial , and their speech was regular rate and rhythm.       Community Group Engagement:  No: not interested    Reported relapse since last meeting? No: no lapse    .  Orders Only on 11/09/2023   Component Date Value Ref Range Status    External Amphetamine Screen Urine 11/09/2023 Positive (A)   Final    External Benzodiazepine Screen Uri* 11/09/2023 Negative   Final    External Cocaine Screen Urine 11/09/2023 Negative   Final    External THC Screen Urine 11/09/2023 Negative   Final    External Methadone Screen Urine 11/09/2023 Negative   Final    External Methamphetamine Screen Ur* 11/09/2023 Negative   Final    External Oxycodone Screen Urine 11/09/2023 Negative   Final    External Buprenorphine Screen Urine 11/09/2023 Negative   Final    External MDMA 11/09/2023 Negative   Final    External Opiates Screen Urine 11/09/2023 Negative   Final   Orders Only on 11/07/2023   Component Date Value Ref Range Status    External Amphetamine Screen Urine 11/07/2023 Negative   Final    External Benzodiazepine Screen Uri* 11/07/2023 Negative   Final    External Cocaine Screen Urine 11/07/2023 Negative   Final    External THC Screen Urine 11/07/2023 Negative   Final    External Methadone Screen Urine 11/07/2023 Negative   Final    External Methamphetamine Screen Ur* 11/07/2023 Negative   Final    External Oxycodone Screen Urine 11/07/2023 Negative   Final    External Buprenorphine Screen Urine 11/07/2023 Negative   Final    External MDMA 11/07/2023 Negative   Final    External Opiates Screen Urine 11/07/2023 Negative   Final   Office Visit on 10/31/2023   Component Date Value Ref Range Status    External Amphetamine Screen Urine 10/31/2023 Positive (A)   Final    External  Benzodiazepine Screen Uri* 10/31/2023 Negative   Final    External Cocaine Screen Urine 10/31/2023 Negative   Final    External THC Screen Urine 10/31/2023 Negative   Final    External Methadone Screen Urine 10/31/2023 Negative   Final    External Methamphetamine Screen Ur* 10/31/2023 Negative   Final    External Oxycodone Screen Urine 10/31/2023 Negative   Final    External Buprenorphine Screen Urine 10/31/2023 Negative   Final    External MDMA 10/31/2023 Negative   Final    External Opiates Screen Urine 10/31/2023 Negative   Final       Impression/Formulation:    ICD-10-CM ICD-9-CM   1. Bipolar affective disorder, mixed  F31.60 296.60   2. Alcohol use disorder, severe, dependence  F10.20 303.90        CLINICAL MANEUVERING/INTERVENTIONS: Therapist utilized a person-centered approach to build and maintain rapport with group member.   Therapist promoted safe nonjudgmental environment by providing group members with unconditional positive regard.  Assisted member in processing above session content; acknowledged and normalized patient's thoughts, feelings and concerns.  Allowed patient to freely discuss issues without interruption or judgment. Provided safe, confidential environment to facilitate the development of positive therapeutic relationship and encourage open, honest communication. Therapist assisted group member with identifying and implementing healthier coping strategies and maintaining healthier boundaries. Assisted patient in identifying risk factors which would indicate the need for higher level of care including thoughts to harm self or others and/or self-harming behavior and encouraged patient to contact this office, call 911, or present to the nearest emergency room should any of these events occur. Pt agreeable to adhere to medication regimen as prescribed and report any side effects.  Discussed crisis intervention services and means to access.  Patient adamantly and convincingly denies current  suicidal or homicidal ideation or perceptual disturbance.      Therapist implemented motivational interviewing techniques to assist client with exploring and resolving ambivalence associated with commitment to change behaviors.  Yes  Therapist utilized dialectical behavior techniques to teach and model emotional regulation and relaxation.  No   Therapist applied cognitive behavioral strategies to facilitate identification of maladaptive patterns of thinking and behavior.  Yes     PLAN:    Continue [facility] IOP Phase II  Aftercare:  [facility] Outpatient Phase 3     Please note that portions of this note were completed with a voice recognition program. Efforts were made to edit dictation, but occasionally words are mistranscribed.     This document signed by Mulugeta Santos LCSW, November 9, 2023, 14:23 EST

## 2023-11-14 ENCOUNTER — OFFICE VISIT (OUTPATIENT)
Dept: PSYCHIATRY | Facility: CLINIC | Age: 28
End: 2023-11-14
Payer: MEDICARE

## 2023-11-14 DIAGNOSIS — F10.20 ALCOHOL USE DISORDER, SEVERE, DEPENDENCE: ICD-10-CM

## 2023-11-14 DIAGNOSIS — F31.60 BIPOLAR AFFECTIVE DISORDER, MIXED: Primary | ICD-10-CM

## 2023-11-14 PROCEDURE — 90853 GROUP PSYCHOTHERAPY: CPT | Performed by: SOCIAL WORKER

## 2023-11-14 NOTE — PROGRESS NOTES
NAME: Eduardo Cannon  DATE: 11/14/2023    [facility] Phase II  [id number]    Services Provided    Group Psychotherapy x 1  Education/Training x 0  Activity Therapy  x 0  Individual Therapy x 0 0 minutes  Family Therapy x 0  MD Initial Visit  x 0  MD Follow-Up   x 0    Number of participants: 6    DATA:  Patient reported he was doing better today. His dog had been in rough shape from food poisoning. It was scary, but he was better now. He was also excited to get his new paz system in today.     Individual: No    Homework: None assigned    Group 1 (Psychotherapy: Check-ins): Therapist continued facilitation of rapport building strategies between group members. Therapist asked that each patient check in with home life and recovery efforts and identify triggers, cravings, and high risk situations that arise between group sessions.  Group members discussed barriers and benefits of attending recovery meetings.  Therapist provided empathy and support during group session. Daily Reading: None     ASSESSMENT:    Personal Assessment 0-10 Scale (10 worst)    Anxiety:  4 (trying to stop smoking)  Depression:  0 (no comment)  Cravings: 0 (no comment)     MSE within normal limits? Yes Affect: somber Mood: calm  Pt Response:  open/receptive  Engaged in activity/Process and self-disclosed: adequate  Applies today's group topics to self: adequate  Able to give and receive feedback: adequate  Demonstrated insightful thinking: inconsistent and adequate  Other pt response comments:  Patient was a positive participant. They demonstrated a relatively positive attitude, a strong degree of motivation, and adequate insight, as evidenced by his level of engagement combined with his occasional use of sarcasm. They seemed interested and attentive. They appeared to comprehend well the concepts being discussed. The patient seemed receptive of, and willing to implement, the ideas being discussed. Their thought process appeared linear and goal  directed, and their speech was regular rate and rhythm.       Community Group Engagement:  No: not interested    Reported relapse since last meeting? No: no lapse    .  Orders Only on 11/09/2023   Component Date Value Ref Range Status    External Amphetamine Screen Urine 11/09/2023 Positive (A)   Final    External Benzodiazepine Screen Uri* 11/09/2023 Negative   Final    External Cocaine Screen Urine 11/09/2023 Negative   Final    External THC Screen Urine 11/09/2023 Negative   Final    External Methadone Screen Urine 11/09/2023 Negative   Final    External Methamphetamine Screen Ur* 11/09/2023 Negative   Final    External Oxycodone Screen Urine 11/09/2023 Negative   Final    External Buprenorphine Screen Urine 11/09/2023 Negative   Final    External MDMA 11/09/2023 Negative   Final    External Opiates Screen Urine 11/09/2023 Negative   Final   Orders Only on 11/07/2023   Component Date Value Ref Range Status    External Amphetamine Screen Urine 11/07/2023 Negative   Final    External Benzodiazepine Screen Uri* 11/07/2023 Negative   Final    External Cocaine Screen Urine 11/07/2023 Negative   Final    External THC Screen Urine 11/07/2023 Negative   Final    External Methadone Screen Urine 11/07/2023 Negative   Final    External Methamphetamine Screen Ur* 11/07/2023 Negative   Final    External Oxycodone Screen Urine 11/07/2023 Negative   Final    External Buprenorphine Screen Urine 11/07/2023 Negative   Final    External MDMA 11/07/2023 Negative   Final    External Opiates Screen Urine 11/07/2023 Negative   Final   Office Visit on 10/31/2023   Component Date Value Ref Range Status    External Amphetamine Screen Urine 10/31/2023 Positive (A)   Final    External Benzodiazepine Screen Uri* 10/31/2023 Negative   Final    External Cocaine Screen Urine 10/31/2023 Negative   Final    External THC Screen Urine 10/31/2023 Negative   Final    External Methadone Screen Urine 10/31/2023 Negative   Final    External  Methamphetamine Screen Ur* 10/31/2023 Negative   Final    External Oxycodone Screen Urine 10/31/2023 Negative   Final    External Buprenorphine Screen Urine 10/31/2023 Negative   Final    External MDMA 10/31/2023 Negative   Final    External Opiates Screen Urine 10/31/2023 Negative   Final       Impression/Formulation:    ICD-10-CM ICD-9-CM   1. Bipolar affective disorder, mixed  F31.60 296.60   2. Alcohol use disorder, severe, dependence  F10.20 303.90        CLINICAL MANEUVERING/INTERVENTIONS: Therapist utilized a person-centered approach to build and maintain rapport with group member.   Therapist promoted safe nonjudgmental environment by providing group members with unconditional positive regard.  Assisted member in processing above session content; acknowledged and normalized patient's thoughts, feelings and concerns.  Allowed patient to freely discuss issues without interruption or judgment. Provided safe, confidential environment to facilitate the development of positive therapeutic relationship and encourage open, honest communication. Therapist assisted group member with identifying and implementing healthier coping strategies and maintaining healthier boundaries. Assisted patient in identifying risk factors which would indicate the need for higher level of care including thoughts to harm self or others and/or self-harming behavior and encouraged patient to contact this office, call 911, or present to the nearest emergency room should any of these events occur. Pt agreeable to adhere to medication regimen as prescribed and report any side effects.  Discussed crisis intervention services and means to access.  Patient adamantly and convincingly denies current suicidal or homicidal ideation or perceptual disturbance.      Therapist implemented motivational interviewing techniques to assist client with exploring and resolving ambivalence associated with commitment to change behaviors.  Yes  Therapist utilized  dialectical behavior techniques to teach and model emotional regulation and relaxation.  No   Therapist applied cognitive behavioral strategies to facilitate identification of maladaptive patterns of thinking and behavior.  Yes     PLAN:    [facility] II  [facility]  [facility]     Please note that portions of this note were completed with a voice recognition program. Efforts were made to edit dictation, but occasionally words are mistranscribed.     This document signed by Mulugeta Santos LCSW, November 14, 2023, 15:21 EST

## 2023-11-16 ENCOUNTER — OFFICE VISIT (OUTPATIENT)
Dept: PSYCHIATRY | Facility: CLINIC | Age: 28
End: 2023-11-16
Payer: MEDICARE

## 2023-11-16 DIAGNOSIS — F10.20 ALCOHOL USE DISORDER, SEVERE, DEPENDENCE: ICD-10-CM

## 2023-11-16 DIAGNOSIS — F31.60 BIPOLAR AFFECTIVE DISORDER, MIXED: Primary | ICD-10-CM

## 2023-11-16 PROCEDURE — 90853 GROUP PSYCHOTHERAPY: CPT | Performed by: SOCIAL WORKER

## 2023-11-16 NOTE — PROGRESS NOTES
"   NAME: Eduardo Cannon  DATE: 11/16/2023    [facility] Phase II  [id number]    Services Provided    Group Psychotherapy x 1  Education/Training x 0  Activity Therapy  x 0  Individual Therapy x 0 0 minutes  Family Therapy x 0  MD Initial Visit  x 0  MD Follow-Up   x 0    Number of participants: 3    DATA:  Patient reported he was doing pretty good today. He reported another instance of gambling recently. He didn't want to talk about it. He had also forgotten to take his medication for the past five days, and he wasn't sure if he wanted to get back on it. What's more, he didn't have the money to fill his prescription. He only had two days left.     Patient also shared that the thoughts crossing his mind lately had been \"bad,\"  but he added that he kind of liked them. He initially expressed the desire to keep these thoughts to himself, but eventually agreed to share. The patient explained that he had been thinking it would be a good idea to li a drug dealer. He expected they would have upwards of $30,000 on them, and they would deserve it anyway. His peers advised against this, giving him direct feedback and even going so far as to say he was \"living in a fantasy\" if he was seriously considering this as a viable option. The patient was not receptive. He reasoned that he could accomplish this, achieve the desired affects, and avoid any of the undesired consequences. He further justified his conclusions by asserting that the justice system was corrupt. He believed what this country needed was revolution anyway. The therapist provided feedback, suggesting his mood might be elevated due to his having ceased his medication. The patient wasn't certain he wanted to be back on his medication because he enjoyed the thrill of the thoughts he had been having. However, carrying out these plans was inaccessible to him at the moment since he was on house arrest for 10 more days. He did not say that he was intent on any action related " to these thoughts, but he seemed to persist in the belief that he could carry them out successfully.     Individual: No    Homework: None assigned    Group 1 (Psychotherapy: Check-ins): Therapist continued facilitation of rapport building strategies between group members. Therapist asked that each patient check in with home life and recovery efforts and identify triggers, cravings, and high risk situations that arise between group sessions.  Group members discussed barriers and benefits of attending recovery meetings.  Therapist provided empathy and support during group session. Daily Reading: None     ASSESSMENT:    Personal Assessment 0-10 Scale (10 worst)    Anxiety:  0 (no comment)  Depression:  0 (no comment)  Cravings: 0 (no comment)     MSE within normal limits? Yes Affect: somber Mood: calm  Pt Response:  defensive and ambivalent  Engaged in activity/Process and self-disclosed: adequate  Applies today's group topics to self: adequate  Able to give and receive feedback: mild  Demonstrated insightful thinking: inconsistent and poor  Other pt response comments:  Patient was a positive participant. They demonstrated a relatively positive attitude, ambivalence, and poor insight, as evidenced by his level of self-disclosure combined with his lack of receptivity to the feedback from the group in response to his expression of nihilistic thoughts. They seemed interested and attentive. They appeared to comprehend well the concepts being discussed. The patient seemed unreceptive of, and unwilling to implement, the ideas being discussed. Their thought process appeared linear and goal directed, and their speech was regular rate and rhythm.       Community Group Engagement:  No: not interested    Reported relapse since last meeting? No: gambling recently     .  Orders Only on 11/09/2023   Component Date Value Ref Range Status    External Amphetamine Screen Urine 11/09/2023 Positive (A)   Final    External Benzodiazepine  Screen Uri* 11/09/2023 Negative   Final    External Cocaine Screen Urine 11/09/2023 Negative   Final    External THC Screen Urine 11/09/2023 Negative   Final    External Methadone Screen Urine 11/09/2023 Negative   Final    External Methamphetamine Screen Ur* 11/09/2023 Negative   Final    External Oxycodone Screen Urine 11/09/2023 Negative   Final    External Buprenorphine Screen Urine 11/09/2023 Negative   Final    External MDMA 11/09/2023 Negative   Final    External Opiates Screen Urine 11/09/2023 Negative   Final   Orders Only on 11/07/2023   Component Date Value Ref Range Status    External Amphetamine Screen Urine 11/07/2023 Negative   Final    External Benzodiazepine Screen Uri* 11/07/2023 Negative   Final    External Cocaine Screen Urine 11/07/2023 Negative   Final    External THC Screen Urine 11/07/2023 Negative   Final    External Methadone Screen Urine 11/07/2023 Negative   Final    External Methamphetamine Screen Ur* 11/07/2023 Negative   Final    External Oxycodone Screen Urine 11/07/2023 Negative   Final    External Buprenorphine Screen Urine 11/07/2023 Negative   Final    External MDMA 11/07/2023 Negative   Final    External Opiates Screen Urine 11/07/2023 Negative   Final   Office Visit on 10/31/2023   Component Date Value Ref Range Status    External Amphetamine Screen Urine 10/31/2023 Positive (A)   Final    External Benzodiazepine Screen Uri* 10/31/2023 Negative   Final    External Cocaine Screen Urine 10/31/2023 Negative   Final    External THC Screen Urine 10/31/2023 Negative   Final    External Methadone Screen Urine 10/31/2023 Negative   Final    External Methamphetamine Screen Ur* 10/31/2023 Negative   Final    External Oxycodone Screen Urine 10/31/2023 Negative   Final    External Buprenorphine Screen Urine 10/31/2023 Negative   Final    External MDMA 10/31/2023 Negative   Final    External Opiates Screen Urine 10/31/2023 Negative   Final       Impression/Formulation:    ICD-10-CM ICD-9-CM    1. Bipolar affective disorder, mixed  F31.60 296.60   2. Alcohol use disorder, severe, dependence  F10.20 303.90        CLINICAL MANEUVERING/INTERVENTIONS: Therapist utilized a person-centered approach to build and maintain rapport with group member.   Therapist promoted safe nonjudgmental environment by providing group members with unconditional positive regard.  Assisted member in processing above session content; acknowledged and normalized patient's thoughts, feelings and concerns.  Allowed patient to freely discuss issues without interruption or judgment. Provided safe, confidential environment to facilitate the development of positive therapeutic relationship and encourage open, honest communication. Therapist assisted group member with identifying and implementing healthier coping strategies and maintaining healthier boundaries. Assisted patient in identifying risk factors which would indicate the need for higher level of care including thoughts to harm self or others and/or self-harming behavior and encouraged patient to contact this office, call 911, or present to the nearest emergency room should any of these events occur. Pt agreeable to adhere to medication regimen as prescribed and report any side effects.  Discussed crisis intervention services and means to access.  Patient adamantly and convincingly denies current suicidal or homicidal ideation or perceptual disturbance.      Therapist implemented motivational interviewing techniques to assist client with exploring and resolving ambivalence associated with commitment to change behaviors.  Yes  Therapist utilized dialectical behavior techniques to teach and model emotional regulation and relaxation.  No   Therapist applied cognitive behavioral strategies to facilitate identification of maladaptive patterns of thinking and behavior.  Yes     PLAN:    [facility]  Aftercare:  [facility] Outpatient Phase  3     Please note that portions of this note were completed with a voice recognition program. Efforts were made to edit dictation, but occasionally words are mistranscribed.     This document signed by Mulugeta Santos LCSW, November 16, 2023, 15:18 EST

## 2023-11-21 ENCOUNTER — OFFICE VISIT (OUTPATIENT)
Dept: PSYCHIATRY | Facility: CLINIC | Age: 28
End: 2023-11-21
Payer: MEDICARE

## 2023-11-21 DIAGNOSIS — F31.60 BIPOLAR AFFECTIVE DISORDER, MIXED: ICD-10-CM

## 2023-11-21 DIAGNOSIS — Z79.899 MEDICATION MANAGEMENT: ICD-10-CM

## 2023-11-21 DIAGNOSIS — F10.20 ALCOHOL USE DISORDER, SEVERE, DEPENDENCE: Primary | ICD-10-CM

## 2023-11-21 PROCEDURE — 90853 GROUP PSYCHOTHERAPY: CPT | Performed by: SOCIAL WORKER

## 2023-11-30 ENCOUNTER — OFFICE VISIT (OUTPATIENT)
Dept: PSYCHIATRY | Facility: CLINIC | Age: 28
End: 2023-11-30
Payer: MEDICARE

## 2023-11-30 DIAGNOSIS — F10.20 ALCOHOL USE DISORDER, SEVERE, DEPENDENCE: Primary | ICD-10-CM

## 2023-11-30 DIAGNOSIS — Z79.899 MEDICATION MANAGEMENT: Primary | ICD-10-CM

## 2023-11-30 DIAGNOSIS — F31.60 BIPOLAR AFFECTIVE DISORDER, MIXED: ICD-10-CM

## 2023-11-30 PROCEDURE — 90853 GROUP PSYCHOTHERAPY: CPT | Performed by: SOCIAL WORKER

## 2023-11-30 NOTE — PROGRESS NOTES
"   NAME: Eduardo Cannon  DATE: 11/30/2023    [facility] Phase II  [id number]    Services Provided    Group Psychotherapy x 1  Education/Training x 0  Activity Therapy  x 0  Individual Therapy x 0 0 minutes  Family Therapy x 0  MD Initial Visit  x 0  MD Follow-Up   x 0    Number of participants: 2    DATA:  Patient reported he was surviving today. Nothing much was new other than having the bracelet off. Regarding Thanksgiving, he reported having kept to himself for the most part. His family had come over, but they annoyed him so he went to bed.     Regarding medication, he reported having been unable to get it filled yet. He had just run out Wednesday or Thursday of last week. He had been gambling, and he had run out of money. 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     Individual: No    Homework: None assigned    Group 1 (Psychotherapy: Check-ins): Therapist continued facilitation of rapport building strategies between group members. Therapist asked that each patient check in with home life and recovery efforts and identify triggers, cravings, and high risk situations that arise between group sessions.  Group members discussed barriers and benefits of attending recovery meetings.  Therapist provided empathy and support during group session. Daily Reading: [employer] (1970), \"Meditation in a Toolshed,\" by JARVIS Beltran     ASSESSMENT:    Personal Assessment 0-10 Scale (10 worst)    Anxiety:  4 (no comment)  Depression:  0 (elevated mood today)  Cravings: 0 (no comment)     MSE within normal limits? Yes Affect: euthymic Mood: calm  Pt Response:  open/receptive  Engaged in activity/Process and self-disclosed: adequate  Applies today's group topics to self: adequate  Able to give and receive feedback: adequate  Demonstrated insightful thinking: occasional and suboptimal  Other pt response comments:  Patient was a positive participant. They demonstrated a relatively optimistic attitude, a " moderate degree of motivation, and moderate insight, as evidenced by his level of engagement combined with his desire to cease compulsive gambling behaviors. They seemed interested and attentive. They appeared to comprehend well the concepts being discussed. The patient seemed receptive of, and willing to implement, the ideas being discussed. Their thought process appeared linear and goal directed, and their speech was regular rate and rhythm.       Community Group Engagement:  No: not interested    Reported relapse since last meeting? No: no lapse    .  Orders Only on 11/30/2023   Component Date Value Ref Range Status    External Amphetamine Screen Urine 11/30/2023 Positive (A)   Final    External Benzodiazepine Screen Uri* 11/30/2023 Negative   Final    External Cocaine Screen Urine 11/30/2023 Negative   Final    External THC Screen Urine 11/30/2023 Negative   Final    External Methadone Screen Urine 11/30/2023 Negative   Final    External Methamphetamine Screen Ur* 11/30/2023 Negative   Final    External Oxycodone Screen Urine 11/30/2023 Negative   Final    External Buprenorphine Screen Urine 11/30/2023 Negative   Final    External MDMA 11/30/2023 Negative   Final    External Opiates Screen Urine 11/30/2023 Negative   Final   Office Visit on 11/21/2023   Component Date Value Ref Range Status    External Amphetamine Screen Urine 11/21/2023 Positive (A)   Final    External Benzodiazepine Screen Uri* 11/21/2023 Negative   Final    External Cocaine Screen Urine 11/21/2023 Negative   Final    External THC Screen Urine 11/21/2023 Negative   Final    External Methadone Screen Urine 11/21/2023 Negative   Final    External Methamphetamine Screen Ur* 11/21/2023 Negative   Final    External Oxycodone Screen Urine 11/21/2023 Negative   Final    External Buprenorphine Screen Urine 11/21/2023 Negative   Final    External MDMA 11/21/2023 Negative   Final    External Opiates Screen Urine 11/21/2023 Negative   Final        Impression/Formulation:    ICD-10-CM ICD-9-CM   1. Alcohol use disorder, severe, dependence  F10.20 303.90   2. Bipolar affective disorder, mixed  F31.60 296.60        CLINICAL MANEUVERING/INTERVENTIONS: Therapist utilized a person-centered approach to build and maintain rapport with group member.   Therapist promoted safe nonjudgmental environment by providing group members with unconditional positive regard.  Assisted member in processing above session content; acknowledged and normalized patient's thoughts, feelings and concerns.  Allowed patient to freely discuss issues without interruption or judgment. Provided safe, confidential environment to facilitate the development of positive therapeutic relationship and encourage open, honest communication. Therapist assisted group member with identifying and implementing healthier coping strategies and maintaining healthier boundaries. Assisted patient in identifying risk factors which would indicate the need for higher level of care including thoughts to harm self or others and/or self-harming behavior and encouraged patient to contact this office, call 911, or present to the nearest emergency room should any of these events occur. Pt agreeable to adhere to medication regimen as prescribed and report any side effects.  Discussed crisis intervention services and means to access.  Patient adamantly and convincingly denies current suicidal or homicidal ideation or perceptual disturbance.      Therapist implemented motivational interviewing techniques to assist client with exploring and resolving ambivalence associated with commitment to change behaviors.  Yes  Therapist utilized dialectical behavior techniques to teach and model emotional regulation and relaxation.  No   Therapist applied cognitive behavioral strategies to facilitate identification of maladaptive patterns of thinking and behavior.  Yes     PLAN:    Continue [facility] CD IOP Phase  II  Aftercare:  [facility] Outpatient Phase 3     Please note that portions of this note were completed with a voice recognition program. Efforts were made to edit dictation, but occasionally words are mistranscribed.     This document signed by Mulugeta Santos LCSW, November 30, 2023, 13:17 EST

## 2023-12-05 ENCOUNTER — OFFICE VISIT (OUTPATIENT)
Dept: PSYCHIATRY | Facility: CLINIC | Age: 28
End: 2023-12-05
Payer: MEDICARE

## 2023-12-05 DIAGNOSIS — Z79.899 MEDICATION MANAGEMENT: ICD-10-CM

## 2023-12-05 DIAGNOSIS — F10.20 ALCOHOL USE DISORDER, SEVERE, DEPENDENCE: Primary | ICD-10-CM

## 2023-12-05 DIAGNOSIS — F31.60 BIPOLAR AFFECTIVE DISORDER, MIXED: ICD-10-CM

## 2023-12-05 PROCEDURE — 90853 GROUP PSYCHOTHERAPY: CPT | Performed by: SOCIAL WORKER

## 2023-12-05 NOTE — PROGRESS NOTES
NAME: Eduardo Cannon  DATE: 12/05/2023    [facility] Phase II  [id number]    Services Provided    Group Psychotherapy x 1  Education/Training x 0  Activity Therapy  x 0  Individual Therapy x 0 0 minutes  Family Therapy x 0  MD Initial Visit  x 0  MD Follow-Up   x 0    Number of participants: 3    DATA:  Patient reported there wasn't much going on. He had been without his medication for two weeks now, because he hadn't been able to get it filled yet. He reported that he was just told recently that he had to work 7 days each week, but he was not upset. Instead, he was elated. He been noticing himself able to get angry again. It was about little things most of the time. Also, however, he would find himself thinking into the future about how doomed he was.     He denied cravings for drugs or alcohol, but reported cravings at 6/10 for adrenaline rush activities which he knew he wasn't supposed to be doing.    Individual: No    Homework: None assigned    Group 1 (Psychotherapy: Check-ins): Therapist continued facilitation of rapport building strategies between group members. Therapist asked that each patient check in with home life and recovery efforts and identify triggers, cravings, and high risk situations that arise between group sessions.  Group members discussed barriers and benefits of attending recovery meetings.  Therapist provided empathy and support during group session. Daily Reading: Do Androids Dream of Electric Sheep? (1968) by Rashard Chow.      ASSESSMENT:    Personal Assessment 0-10 Scale (10 worst)    Anxiety:  0 (-2)  Depression:  0 (no comment)  Cravings: 0 (using dream about alcohol)     MSE within normal limits? Yes Affect: euthymic Mood:  elevated  Pt Response:  guarded  Engaged in activity/Process and self-disclosed: adequate  Applies today's group topics to self: suboptimal  Able to give and receive feedback: adequate  Demonstrated insightful thinking: occasional and suboptimal  Other pt  response comments:  Patient was a positive participant. They demonstrated a relatively apathetic attitude, ambivalence, and moderate insight, as evidenced by his level of engagement combined with his elevated mood. They seemed interested and attentive. They appeared to comprehend well the concepts being discussed. The patient seemed receptive of, and willing to implement, the ideas being discussed. Their thought process appeared linear and goal directed, and their speech was regular rate and rhythm.       Community Group Engagement:  No: not engaged    Reported relapse since last meeting? No: no lapse    .  Appointment on 12/05/2023   Component Date Value Ref Range Status    External Amphetamine Screen Urine 12/05/2023 Negative   Final    External Benzodiazepine Screen Uri* 12/05/2023 Negative   Final    External Cocaine Screen Urine 12/05/2023 Negative   Final    External THC Screen Urine 12/05/2023 Negative   Final    External Methadone Screen Urine 12/05/2023 Negative   Final    External Methamphetamine Screen Ur* 12/05/2023 Negative   Final    External Oxycodone Screen Urine 12/05/2023 Negative   Final    External Buprenorphine Screen Urine 12/05/2023 Negative   Final    External MDMA 12/05/2023 Negative   Final    External Opiates Screen Urine 12/05/2023 Negative   Final   Orders Only on 11/30/2023   Component Date Value Ref Range Status    External Amphetamine Screen Urine 11/30/2023 Positive (A)   Final    External Benzodiazepine Screen Uri* 11/30/2023 Negative   Final    External Cocaine Screen Urine 11/30/2023 Negative   Final    External THC Screen Urine 11/30/2023 Negative   Final    External Methadone Screen Urine 11/30/2023 Negative   Final    External Methamphetamine Screen Ur* 11/30/2023 Negative   Final    External Oxycodone Screen Urine 11/30/2023 Negative   Final    External Buprenorphine Screen Urine 11/30/2023 Negative   Final    External MDMA 11/30/2023 Negative   Final    External Opiates  Screen Urine 11/30/2023 Negative   Final   Office Visit on 11/21/2023   Component Date Value Ref Range Status    External Amphetamine Screen Urine 11/21/2023 Positive (A)   Final    External Benzodiazepine Screen Uri* 11/21/2023 Negative   Final    External Cocaine Screen Urine 11/21/2023 Negative   Final    External THC Screen Urine 11/21/2023 Negative   Final    External Methadone Screen Urine 11/21/2023 Negative   Final    External Methamphetamine Screen Ur* 11/21/2023 Negative   Final    External Oxycodone Screen Urine 11/21/2023 Negative   Final    External Buprenorphine Screen Urine 11/21/2023 Negative   Final    External MDMA 11/21/2023 Negative   Final    External Opiates Screen Urine 11/21/2023 Negative   Final       Impression/Formulation:    ICD-10-CM ICD-9-CM   1. Alcohol use disorder, severe, dependence  F10.20 303.90   2. Bipolar affective disorder, mixed  F31.60 296.60   3. Medication management  Z79.899 V58.69        CLINICAL MANEUVERING/INTERVENTIONS: Therapist utilized a person-centered approach to build and maintain rapport with group member.   Therapist promoted safe nonjudgmental environment by providing group members with unconditional positive regard.  Assisted member in processing above session content; acknowledged and normalized patient's thoughts, feelings and concerns.  Allowed patient to freely discuss issues without interruption or judgment. Provided safe, confidential environment to facilitate the development of positive therapeutic relationship and encourage open, honest communication. Therapist assisted group member with identifying and implementing healthier coping strategies and maintaining healthier boundaries. Assisted patient in identifying risk factors which would indicate the need for higher level of care including thoughts to harm self or others and/or self-harming behavior and encouraged patient to contact this office, call 911, or present to the nearest emergency room  should any of these events occur. Pt agreeable to adhere to medication regimen as prescribed and report any side effects.  Discussed crisis intervention services and means to access.  Patient adamantly and convincingly denies current suicidal or homicidal ideation or perceptual disturbance.      Therapist implemented motivational interviewing techniques to assist client with exploring and resolving ambivalence associated with commitment to change behaviors.  Yes  Therapist utilized dialectical behavior techniques to teach and model emotional regulation and relaxation.  No   Therapist applied cognitive behavioral strategies to facilitate identification of maladaptive patterns of thinking and behavior.  Yes     PLAN:    Continue [facility] CD IOP Phase II  Aftercare:  [facility] CD Outpatient Phase 3     Please note that portions of this note were completed with a voice recognition program. Efforts were made to edit dictation, but occasionally words are mistranscribed.     This document signed by Mulugeta Santos LCSW, December 5, 2023, 14:47 EST

## 2023-12-12 ENCOUNTER — OFFICE VISIT (OUTPATIENT)
Dept: PSYCHIATRY | Facility: CLINIC | Age: 28
End: 2023-12-12
Payer: MEDICARE

## 2023-12-12 DIAGNOSIS — Z79.899 MEDICATION MANAGEMENT: Primary | ICD-10-CM

## 2023-12-12 NOTE — PROGRESS NOTES
This provider is located at [facility] [address] [facility] [place] [address].  The Patient is seen remotely at his/her home, using Zoom. Patient is being seen via telehealth and confirm that they are in a secure environment for this session. The patient's condition being diagnosed/treated is appropriate for telemedicine. The provider identified herself as well as her credentials.   The patient gave consent to be seen remotely, and when consent is given they understand that the consent allows for patient identifiable information to be sent to a third party as needed.   They may refuse to be seen remotely at any time. The electronic data is encrypted and password protected, and the patient has been advised of the potential risks to privacy not withstanding such measures.      NAME: Eduardo Cannon  DATE: 12/12/2023     Phase       Services Provided    Group Psychotherapy x ***  Education/Training x ***  Activity Therapy  x ***  Individual Therapy x *** *** minutes  Family Therapy x ***  MD Initial Visit  x ***  MD Follow-Up   x ***    Number of participants:     DATA:  Patient reported that he found a way to get here today. He had realized that he needed his paper for court next Tuesday, the 19th. He shared about his gambling habit this weekend. He started with $50 and turned it into $8,000. On another occasion he started with $25 and turned it into $5,000. He had also ordered $10,000 worth of night vision thermal monoculars, but he canceled the order before it shipped.     Regarding work, he reported it was nearly time to go back to a 7 day work week. He hoped this would help him not murray. He would have more money and less idle time.     As far as medicine is concerned, he reported having taken it a couple times in the last three weeks. He rarely had enough money to get it filled. He hoped to get it filled on Monday when he got paid again.     Individual:     Homework:     Group 1  (Psychotherapy: Check-ins): Therapist continued facilitation of rapport building strategies between group members. Therapist asked that each patient check in with home life and recovery efforts and identify triggers, cravings, and high risk situations that arise between group sessions.  Group members discussed barriers and benefits of attending recovery meetings.  Therapist provided empathy and support during group session. Daily Reading:     Group 2  () ***    Group 3 () ***    Group 4 (***) ***     ASSESSMENT:    Personal Assessment 0-10 Scale (10 worst)    Anxiety:  0 (***)  Depression:  2 (7 over this past week)  Cravings: 1 (alcohol)     MSE within normal limits? Affect:  Mood:   Pt Response:    Engaged in activity/Process and self-disclosed:   Applies today's group topics to self:   Able to give and receive feedback:   Demonstrated insightful thinking:  and   Other pt response comments:  Patient was a positive participant***. They demonstrated a relatively , , and , as evidenced by *** combined with ***. They seemed . They appeared to . The patient seemed  of, and  to implement, the ideas being discussed. Their thought process appeared , and their speech was .       Community Group Engagement:      Reported relapse since last meeting?     .  Office Visit on 12/05/2023   Component Date Value Ref Range Status    External Amphetamine Screen Urine 12/05/2023 Negative   Final    External Benzodiazepine Screen Uri* 12/05/2023 Negative   Final    External Cocaine Screen Urine 12/05/2023 Negative   Final    External THC Screen Urine 12/05/2023 Negative   Final    External Methadone Screen Urine 12/05/2023 Negative   Final    External Methamphetamine Screen Ur* 12/05/2023 Negative   Final    External Oxycodone Screen Urine 12/05/2023 Negative   Final    External Buprenorphine Screen Urine 12/05/2023 Negative   Final    External MDMA 12/05/2023 Negative   Final    External Opiates Screen Urine 12/05/2023 Negative    Final   Orders Only on 11/30/2023   Component Date Value Ref Range Status    External Amphetamine Screen Urine 11/30/2023 Positive (A)   Final    External Benzodiazepine Screen Uri* 11/30/2023 Negative   Final    External Cocaine Screen Urine 11/30/2023 Negative   Final    External THC Screen Urine 11/30/2023 Negative   Final    External Methadone Screen Urine 11/30/2023 Negative   Final    External Methamphetamine Screen Ur* 11/30/2023 Negative   Final    External Oxycodone Screen Urine 11/30/2023 Negative   Final    External Buprenorphine Screen Urine 11/30/2023 Negative   Final    External MDMA 11/30/2023 Negative   Final    External Opiates Screen Urine 11/30/2023 Negative   Final       Impression/Formulation:  No diagnosis found.     CLINICAL MANEUVERING/INTERVENTIONS: Therapist utilized a person-centered approach to build and maintain rapport with group member.   Therapist promoted safe nonjudgmental environment by providing group members with unconditional positive regard.  Assisted member in processing above session content; acknowledged and normalized patient's thoughts, feelings and concerns.  Allowed patient to freely discuss issues without interruption or judgment. Provided safe, confidential environment to facilitate the development of positive therapeutic relationship and encourage open, honest communication. Therapist assisted group member with identifying and implementing healthier coping strategies and maintaining healthier boundaries. Assisted patient in identifying risk factors which would indicate the need for higher level of care including thoughts to harm self or others and/or self-harming behavior and encouraged patient to contact this office, call 911, or present to the nearest emergency room should any of these events occur. Pt agreeable to adhere to medication regimen as prescribed and report any side effects.  Discussed crisis intervention services and means to access.  Patient adamantly  and convincingly denies current suicidal or homicidal ideation or perceptual disturbance.      Therapist implemented motivational interviewing techniques to assist client with exploring and resolving ambivalence associated with commitment to change behaviors.    Therapist utilized dialectical behavior techniques to teach and model emotional regulation and relaxation.     Therapist applied cognitive behavioral strategies to facilitate identification of maladaptive patterns of thinking and behavior.       PLAN:    Continue [facility]  Phase   Aftercare:  [facility] CD Outpatient Phase 2 ***     Please note that portions of this note were completed with a voice recognition program. Efforts were made to edit dictation, but occasionally words are mistranscribed.     This document signed by Mulugeta Santos LCSW, December 12, 2023, 11:26 EST

## 2023-12-19 ENCOUNTER — OFFICE VISIT (OUTPATIENT)
Dept: PSYCHIATRY | Facility: CLINIC | Age: 28
End: 2023-12-19
Payer: MEDICARE

## 2023-12-19 DIAGNOSIS — Z79.899 MEDICATION MANAGEMENT: Primary | ICD-10-CM

## 2023-12-19 DIAGNOSIS — F10.20 ALCOHOL USE DISORDER, SEVERE, DEPENDENCE: Primary | ICD-10-CM

## 2023-12-19 DIAGNOSIS — F31.60 BIPOLAR AFFECTIVE DISORDER, MIXED: ICD-10-CM

## 2023-12-19 PROCEDURE — 90853 GROUP PSYCHOTHERAPY: CPT | Performed by: SOCIAL WORKER

## 2023-12-19 NOTE — PROGRESS NOTES
NAME: Eduardo Cannon  DATE: 12/19/2023     IOP Phase II  [id number]    Services Provided    Group Psychotherapy x 1  Education/Training x 0  Activity Therapy  x 0  Individual Therapy x 0 0 minutes  Family Therapy x 0  MD Initial Visit  x 0  MD Follow-Up   x 0    Number of participants: 2    DATA:  Patient reported he had been working a lot. He had gone to court today. It went well. It had been the first time he had ever went to court and the  was happy with him. Patient also shared about how much his mood had been fluctuating over the last few weeks. This seemed primarily attributable to his medication. He had been unable to afford it until today. The patient reported passive suicidal thoughts without intent. He was aware that he could come to the hospital if necessary, and he did not believe it had reached that point. The patient reported that he had reached $35,000 on a gambling danny, and he had lost it all. We reflected together as a group about the possibility that he was pursuing some kind of stimulation, and nothing seemed stimulating like gambling was stimulating. He agreed this was the case.  The patient also reported that this IOP group had been the highlight of his week. He had no one to talk to otherwise, which was partially due to his job  being an isolated night shift position and partially due to standards he set for friends that his friends often broke.     Individual: No    Homework: None assigned    Group 1 (Psychotherapy: Check-ins): Therapist continued facilitation of rapport building strategies between group members. Therapist asked that each patient check in with home life and recovery efforts and identify triggers, cravings, and high risk situations that arise between group sessions.  Group members discussed barriers and benefits of attending recovery meetings.  Therapist provided empathy and support during group session. Daily Reading: None     ASSESSMENT:    Personal Assessment 0-10  Scale (10 worst)    Anxiety:  2 (no comment)  Depression:  5 (no comment)  Cravings: 2 (no comment)     MSE within normal limits? Yes Affect: somber Mood: depressed  Pt Response:  open/receptive  Engaged in activity/Process and self-disclosed: adequate  Applies today's group topics to self: adequate  Able to give and receive feedback: adequate  Demonstrated insightful thinking: consistent and suboptimal  Other pt response comments:  Patient was a positive participant. They demonstrated a relatively optimistic attitude, a moderate degree of motivation, and moderate insight, as evidenced by his level of engagement combined with his level of self-disclosure. They seemed interested and attentive. They appeared to comprehend well the concepts being discussed. The patient seemed receptive of, and willing to implement, the ideas being discussed. Their thought process appeared linear and goal directed, and their speech was regular rate and rhythm.       Community Group Engagement:  No: not interested    Reported relapse since last meeting? No: no lapse    .  Orders Only on 12/19/2023   Component Date Value Ref Range Status    External Amphetamine Screen Urine 12/19/2023 Positive (A)   Final    External Benzodiazepine Screen Uri* 12/19/2023 Negative   Final    External Cocaine Screen Urine 12/19/2023 Negative   Final    External THC Screen Urine 12/19/2023 Negative   Final    External Methadone Screen Urine 12/19/2023 Negative   Final    External Methamphetamine Screen Ur* 12/19/2023 Negative   Final    External Oxycodone Screen Urine 12/19/2023 Negative   Final    External Buprenorphine Screen Urine 12/19/2023 Negative   Final    External MDMA 12/19/2023 Negative   Final    External Opiates Screen Urine 12/19/2023 Negative   Final   Appointment on 12/12/2023   Component Date Value Ref Range Status    External Amphetamine Screen Urine 12/12/2023 Positive (A)   Final    External Benzodiazepine Screen Uri* 12/12/2023 Negative    Final    External Cocaine Screen Urine 12/12/2023 Negative   Final    External THC Screen Urine 12/12/2023 Negative   Final    External Methadone Screen Urine 12/12/2023 Negative   Final    External Methamphetamine Screen Ur* 12/12/2023 Negative   Final    External Oxycodone Screen Urine 12/12/2023 Negative   Final    External Buprenorphine Screen Urine 12/12/2023 Negative   Final    External MDMA 12/12/2023 Negative   Final    External Opiates Screen Urine 12/12/2023 Negative   Final   Office Visit on 12/05/2023   Component Date Value Ref Range Status    External Amphetamine Screen Urine 12/05/2023 Negative   Final    External Benzodiazepine Screen Uri* 12/05/2023 Negative   Final    External Cocaine Screen Urine 12/05/2023 Negative   Final    External THC Screen Urine 12/05/2023 Negative   Final    External Methadone Screen Urine 12/05/2023 Negative   Final    External Methamphetamine Screen Ur* 12/05/2023 Negative   Final    External Oxycodone Screen Urine 12/05/2023 Negative   Final    External Buprenorphine Screen Urine 12/05/2023 Negative   Final    External MDMA 12/05/2023 Negative   Final    External Opiates Screen Urine 12/05/2023 Negative   Final   Orders Only on 11/30/2023   Component Date Value Ref Range Status    External Amphetamine Screen Urine 11/30/2023 Positive (A)   Final    External Benzodiazepine Screen Uri* 11/30/2023 Negative   Final    External Cocaine Screen Urine 11/30/2023 Negative   Final    External THC Screen Urine 11/30/2023 Negative   Final    External Methadone Screen Urine 11/30/2023 Negative   Final    External Methamphetamine Screen Ur* 11/30/2023 Negative   Final    External Oxycodone Screen Urine 11/30/2023 Negative   Final    External Buprenorphine Screen Urine 11/30/2023 Negative   Final    External MDMA 11/30/2023 Negative   Final    External Opiates Screen Urine 11/30/2023 Negative   Final       Impression/Formulation:    ICD-10-CM ICD-9-CM   1. Alcohol use disorder,  severe, dependence  F10.20 303.90   2. Bipolar affective disorder, mixed  F31.60 296.60        CLINICAL MANEUVERING/INTERVENTIONS: Therapist utilized a person-centered approach to build and maintain rapport with group member.   Therapist promoted safe nonjudgmental environment by providing group members with unconditional positive regard.  Assisted member in processing above session content; acknowledged and normalized patient's thoughts, feelings and concerns.  Allowed patient to freely discuss issues without interruption or judgment. Provided safe, confidential environment to facilitate the development of positive therapeutic relationship and encourage open, honest communication. Therapist assisted group member with identifying and implementing healthier coping strategies and maintaining healthier boundaries. Assisted patient in identifying risk factors which would indicate the need for higher level of care including thoughts to harm self or others and/or self-harming behavior and encouraged patient to contact this office, call 911, or present to the nearest emergency room should any of these events occur. Pt agreeable to adhere to medication regimen as prescribed and report any side effects.  Discussed crisis intervention services and means to access.  Patient adamantly and convincingly denies current suicidal or homicidal ideation or perceptual disturbance.      Therapist implemented motivational interviewing techniques to assist client with exploring and resolving ambivalence associated with commitment to change behaviors.  Yes  Therapist utilized dialectical behavior techniques to teach and model emotional regulation and relaxation.  No   Therapist applied cognitive behavioral strategies to facilitate identification of maladaptive patterns of thinking and behavior.  Yes     PLAN:    Continue [facility] CD IOP Phase II  Aftercare:  [facility] CD Outpatient Phase 2      Please note that  portions of this note were completed with a voice recognition program. Efforts were made to edit dictation, but occasionally words are mistranscribed.     This document signed by Mulugeta Santos LCSW, December 19, 2023, 15:12 EST

## 2023-12-21 ENCOUNTER — OFFICE VISIT (OUTPATIENT)
Dept: PSYCHIATRY | Facility: CLINIC | Age: 28
End: 2023-12-21
Payer: MEDICARE

## 2023-12-21 DIAGNOSIS — F10.20 ALCOHOL USE DISORDER, SEVERE, DEPENDENCE: Primary | ICD-10-CM

## 2023-12-21 DIAGNOSIS — F31.60 BIPOLAR AFFECTIVE DISORDER, MIXED: ICD-10-CM

## 2023-12-21 PROCEDURE — 90853 GROUP PSYCHOTHERAPY: CPT | Performed by: SOCIAL WORKER

## 2023-12-21 NOTE — PROGRESS NOTES
NAME: Eduardo Cannon  DATE: 12/21/2023    [facility] Phase II  [id number]    Services Provided    Group Psychotherapy x 1  Education/Training x 0  Activity Therapy  x 0  Individual Therapy x 0 0 minutes  Family Therapy x 0  MD Initial Visit  x 0  MD Follow-Up   x 0    Number of participants: 2    DATA:  Patient reported he was not looking forward to [place], or he was at least apathetic about it. There would be about 15 family members over at his house that day, and one of them would be his mother who he identified as narcissistic. He planned to be sleeping the whole time, partially because he will have been working the night before and partially to avoid the interactions with his family. He spent some time sharing with the group about the ways in which his mother had been narcissistic. Also, he shared that he had begun taking his medication again as of today.     Individual: No    Homework: None assigned    Group 1 (Psychotherapy: Check-ins): Therapist continued facilitation of rapport building strategies between group members. Therapist asked that each patient check in with home life and recovery efforts and identify triggers, cravings, and high risk situations that arise between group sessions.  Group members discussed barriers and benefits of attending recovery meetings.  Therapist provided empathy and support during group session. Daily Reading: None     ASSESSMENT:    Personal Assessment 0-10 Scale (10 worst)    Anxiety:  5 (no comment)  Depression:  3 (no comment)  Cravings: 2 (no comment)     MSE within normal limits? Yes Affect: somber Mood: depressed  Pt Response:  open/receptive  Engaged in activity/Process and self-disclosed: adequate  Applies today's group topics to self: adequate  Able to give and receive feedback: adequate  Demonstrated insightful thinking: occasional and suboptimal  Other pt response comments:  Patient was a positive participant. They demonstrated a relatively positive attitude, a  moderate degree of motivation, and moderate insight, as evidenced by his level of engagement combined with his anticipation of a poor experience over the holiday. They seemed interested and attentive. They appeared to comprehend well the concepts being discussed. The patient seemed doubtful of, and somewhat willing to implement, the ideas being discussed. Their thought process appeared linear and goal directed, and their speech was regular rate and rhythm.       Community Group Engagement:  No: not interested    Reported relapse since last meeting? No: no lapse    .  Orders Only on 12/19/2023   Component Date Value Ref Range Status    External Amphetamine Screen Urine 12/19/2023 Positive (A)   Final    External Benzodiazepine Screen Uri* 12/19/2023 Negative   Final    External Cocaine Screen Urine 12/19/2023 Negative   Final    External THC Screen Urine 12/19/2023 Negative   Final    External Methadone Screen Urine 12/19/2023 Negative   Final    External Methamphetamine Screen Ur* 12/19/2023 Negative   Final    External Oxycodone Screen Urine 12/19/2023 Negative   Final    External Buprenorphine Screen Urine 12/19/2023 Negative   Final    External MDMA 12/19/2023 Negative   Final    External Opiates Screen Urine 12/19/2023 Negative   Final   Appointment on 12/12/2023   Component Date Value Ref Range Status    External Amphetamine Screen Urine 12/12/2023 Positive (A)   Final    External Benzodiazepine Screen Uri* 12/12/2023 Negative   Final    External Cocaine Screen Urine 12/12/2023 Negative   Final    External THC Screen Urine 12/12/2023 Negative   Final    External Methadone Screen Urine 12/12/2023 Negative   Final    External Methamphetamine Screen Ur* 12/12/2023 Negative   Final    External Oxycodone Screen Urine 12/12/2023 Negative   Final    External Buprenorphine Screen Urine 12/12/2023 Negative   Final    External MDMA 12/12/2023 Negative   Final    External Opiates Screen Urine 12/12/2023 Negative   Final    Office Visit on 12/05/2023   Component Date Value Ref Range Status    External Amphetamine Screen Urine 12/05/2023 Negative   Final    External Benzodiazepine Screen Uri* 12/05/2023 Negative   Final    External Cocaine Screen Urine 12/05/2023 Negative   Final    External THC Screen Urine 12/05/2023 Negative   Final    External Methadone Screen Urine 12/05/2023 Negative   Final    External Methamphetamine Screen Ur* 12/05/2023 Negative   Final    External Oxycodone Screen Urine 12/05/2023 Negative   Final    External Buprenorphine Screen Urine 12/05/2023 Negative   Final    External MDMA 12/05/2023 Negative   Final    External Opiates Screen Urine 12/05/2023 Negative   Final       Impression/Formulation:    ICD-10-CM ICD-9-CM   1. Alcohol use disorder, severe, dependence  F10.20 303.90   2. Bipolar affective disorder, mixed  F31.60 296.60        CLINICAL MANEUVERING/INTERVENTIONS: Therapist utilized a person-centered approach to build and maintain rapport with group member.   Therapist promoted safe nonjudgmental environment by providing group members with unconditional positive regard.  Assisted member in processing above session content; acknowledged and normalized patient's thoughts, feelings and concerns.  Allowed patient to freely discuss issues without interruption or judgment. Provided safe, confidential environment to facilitate the development of positive therapeutic relationship and encourage open, honest communication. Therapist assisted group member with identifying and implementing healthier coping strategies and maintaining healthier boundaries. Assisted patient in identifying risk factors which would indicate the need for higher level of care including thoughts to harm self or others and/or self-harming behavior and encouraged patient to contact this office, call 911, or present to the nearest emergency room should any of these events occur. Pt agreeable to adhere to medication regimen as prescribed  and report any side effects.  Discussed crisis intervention services and means to access.  Patient adamantly and convincingly denies current suicidal or homicidal ideation or perceptual disturbance.      Therapist implemented motivational interviewing techniques to assist client with exploring and resolving ambivalence associated with commitment to change behaviors.  Yes  Therapist utilized dialectical behavior techniques to teach and model emotional regulation and relaxation.  No   Therapist applied cognitive behavioral strategies to facilitate identification of maladaptive patterns of thinking and behavior.  Yes     PLAN:    Continue [facility] Phase II  Aftercare:  [facility] Phase 3     Please note that portions of this note were completed with a voice recognition program. Efforts were made to edit dictation, but occasionally words are mistranscribed.     This document signed by Mulugeta Santos LCSW, December 21, 2023, 14:59 EST

## 2023-12-28 ENCOUNTER — OFFICE VISIT (OUTPATIENT)
Dept: PSYCHIATRY | Facility: CLINIC | Age: 28
End: 2023-12-28
Payer: MEDICARE

## 2023-12-28 DIAGNOSIS — F31.60 BIPOLAR AFFECTIVE DISORDER, MIXED: ICD-10-CM

## 2023-12-28 DIAGNOSIS — Z79.899 MEDICATION MANAGEMENT: ICD-10-CM

## 2023-12-28 DIAGNOSIS — F10.20 ALCOHOL USE DISORDER, SEVERE, DEPENDENCE: Primary | ICD-10-CM

## 2023-12-28 PROCEDURE — 90853 GROUP PSYCHOTHERAPY: CPT | Performed by: SOCIAL WORKER
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
## 2024-01-02 ENCOUNTER — OFFICE VISIT (OUTPATIENT)
Dept: PSYCHIATRY | Facility: CLINIC | Age: 29
End: 2024-01-02
Payer: MEDICARE

## 2024-01-02 DIAGNOSIS — Z79.899 MEDICATION MANAGEMENT: ICD-10-CM

## 2024-01-02 DIAGNOSIS — F31.60 BIPOLAR AFFECTIVE DISORDER, MIXED: ICD-10-CM

## 2024-01-02 DIAGNOSIS — F10.20 ALCOHOL USE DISORDER, SEVERE, DEPENDENCE: Primary | ICD-10-CM

## 2024-01-02 PROCEDURE — 90853 GROUP PSYCHOTHERAPY: CPT | Performed by: SOCIAL WORKER

## 2024-01-02 NOTE — PROGRESS NOTES
NAME: Eduardo Cannon  DATE: 01/02/2024    [facility] Phase III  [id number]    Services Provided    Group Psychotherapy x 1  Education/Training x 0  Activity Therapy  x 0  Individual Therapy x 0 0 minutes  Family Therapy x 0  MD Initial Visit  x 0  MD Follow-Up   x 0    Number of participants: 2    DATA:  Patient reported he was doing pretty good today. He shared about the conspiracy theories that had been interesting to him lately. We also continued our discussion about morality. Patient denied any intent to harm. He also reported that he had been taking his medication, but he reported that he would need to cancel his appointment next Monday because his insurance would lapse soon.     Individual: No    Homework: None assigned    Group 1 (Psychotherapy: Check-ins): Therapist continued facilitation of rapport building strategies between group members. Therapist asked that each patient check in with home life and recovery efforts and identify triggers, cravings, and high risk situations that arise between group sessions.  Group members discussed barriers and benefits of attending recovery meetings.  Therapist provided empathy and support during group session. Daily Reading: None     ASSESSMENT:    Personal Assessment 0-10 Scale (10 worst)    Anxiety:  0 (no comment)  Depression:  0 (no comment)  Cravings: 0 (no comment)     MSE within normal limits? Yes Affect: somber Mood: calm  Pt Response:  open/receptive  Engaged in activity/Process and self-disclosed: adequate  Applies today's group topics to self: adequate  Able to give and receive feedback: adequate  Demonstrated insightful thinking: consistent and adequate  Other pt response comments:  Patient was a positive participant. They demonstrated a relatively positive attitude, a strong degree of motivation, and adequate insight, as evidenced by his level of engagement combined with his open-mindedness. They seemed interested and attentive. They appeared to comprehend  well the concepts being discussed. The patient seemed receptive of, and willing to implement, the ideas being discussed. Their thought process appeared linear and goal directed, and their speech was regular rate and rhythm.       Community Group Engagement:  No: not interested    Reported relapse since last meeting? No: no lapse    .  Appointment on 01/02/2024   Component Date Value Ref Range Status    External Amphetamine Screen Urine 01/02/2024 Negative   Final    External Benzodiazepine Screen Uri* 01/02/2024 Negative   Final    External Cocaine Screen Urine 01/02/2024 Negative   Final    External THC Screen Urine 01/02/2024 Negative   Final    External Methadone Screen Urine 01/02/2024 Negative   Final    External Methamphetamine Screen Ur* 01/02/2024 Negative   Final    External Oxycodone Screen Urine 01/02/2024 Negative   Final    External Buprenorphine Screen Urine 01/02/2024 Negative   Final    External MDMA 01/02/2024 Negative   Final    External Opiates Screen Urine 01/02/2024 Negative   Final   Office Visit on 12/28/2023   Component Date Value Ref Range Status    External Amphetamine Screen Urine 12/28/2023 Negative   Final    External Benzodiazepine Screen Uri* 12/28/2023 Negative   Final    External Cocaine Screen Urine 12/28/2023 Negative   Final    External THC Screen Urine 12/28/2023 Negative   Final    External Methadone Screen Urine 12/28/2023 Negative   Final    External Methamphetamine Screen Ur* 12/28/2023 Negative   Final    External Oxycodone Screen Urine 12/28/2023 Negative   Final    External Buprenorphine Screen Urine 12/28/2023 Negative   Final    External MDMA 12/28/2023 Negative   Final    External Opiates Screen Urine 12/28/2023 Negative   Final   Orders Only on 12/19/2023   Component Date Value Ref Range Status    External Amphetamine Screen Urine 12/19/2023 Positive (A)   Final    External Benzodiazepine Screen Uri* 12/19/2023 Negative   Final    External Cocaine Screen Urine  12/19/2023 Negative   Final    External THC Screen Urine 12/19/2023 Negative   Final    External Methadone Screen Urine 12/19/2023 Negative   Final    External Methamphetamine Screen Ur* 12/19/2023 Negative   Final    External Oxycodone Screen Urine 12/19/2023 Negative   Final    External Buprenorphine Screen Urine 12/19/2023 Negative   Final    External MDMA 12/19/2023 Negative   Final    External Opiates Screen Urine 12/19/2023 Negative   Final       Impression/Formulation:    ICD-10-CM ICD-9-CM   1. Alcohol use disorder, severe, dependence  F10.20 303.90   2. Bipolar affective disorder, mixed  F31.60 296.60   3. Medication management  Z79.899 V58.69        CLINICAL MANEUVERING/INTERVENTIONS: Therapist utilized a person-centered approach to build and maintain rapport with group member.   Therapist promoted safe nonjudgmental environment by providing group members with unconditional positive regard.  Assisted member in processing above session content; acknowledged and normalized patient's thoughts, feelings and concerns.  Allowed patient to freely discuss issues without interruption or judgment. Provided safe, confidential environment to facilitate the development of positive therapeutic relationship and encourage open, honest communication. Therapist assisted group member with identifying and implementing healthier coping strategies and maintaining healthier boundaries. Assisted patient in identifying risk factors which would indicate the need for higher level of care including thoughts to harm self or others and/or self-harming behavior and encouraged patient to contact this office, call 911, or present to the nearest emergency room should any of these events occur. Pt agreeable to adhere to medication regimen as prescribed and report any side effects.  Discussed crisis intervention services and means to access.  Patient adamantly and convincingly denies current suicidal or homicidal ideation or perceptual  disturbance.      Therapist implemented motivational interviewing techniques to assist client with exploring and resolving ambivalence associated with commitment to change behaviors.  Yes  Therapist utilized dialectical behavior techniques to teach and model emotional regulation and relaxation.  No   Therapist applied cognitive behavioral strategies to facilitate identification of maladaptive patterns of thinking and behavior.  Yes     PLAN:    Continue [facility] Phase III  Aftercare:  [facility] outpatient therapy      Please note that portions of this note were completed with a voice recognition program. Efforts were made to edit dictation, but occasionally words are mistranscribed.     This document signed by Mulugeta Santos LCSW, January 2, 2024, 14:51 EST

## 2024-01-09 ENCOUNTER — OFFICE VISIT (OUTPATIENT)
Dept: PSYCHIATRY | Facility: CLINIC | Age: 29
End: 2024-01-09
Payer: MEDICARE

## 2024-01-09 DIAGNOSIS — Z79.899 MEDICATION MANAGEMENT: ICD-10-CM

## 2024-01-09 DIAGNOSIS — F10.20 ALCOHOL USE DISORDER, SEVERE, DEPENDENCE: Primary | ICD-10-CM

## 2024-01-09 DIAGNOSIS — F31.60 BIPOLAR AFFECTIVE DISORDER, MIXED: ICD-10-CM

## 2024-01-09 PROCEDURE — 90853 GROUP PSYCHOTHERAPY: CPT | Performed by: SOCIAL WORKER

## 2024-01-09 NOTE — PROGRESS NOTES
NAME: Eduardo Cannon  DATE: 01/09/2024     IOP Phase III  [id number]    Services Provided    Group Psychotherapy x 1  Education/Training x 0  Activity Therapy  x 0  Individual Therapy x 0 0 minutes  Family Therapy x 0  MD Initial Visit  x 0  MD Follow-Up   x 0    Number of participants: 3    DATA:  Patient reported he was getting by today. He reported having pulled his back last night. He also hadn't slept because he had been watching a movie, and three hours of sleep between work and IOP seemed to not be worth it. He had been thinking a lot about politics lately, and the current state of things was making him angry. He had been thinking about starting a new hobby, maybe a podcast.     Individual: No    Homework: None assigned    Group 1 (Psychotherapy: Check-ins): Therapist continued facilitation of rapport building strategies between group members. Therapist asked that each patient check in with home life and recovery efforts and identify triggers, cravings, and high risk situations that arise between group sessions.  Group members discussed barriers and benefits of attending recovery meetings.  Therapist provided empathy and support during group session. Daily Reading: None     ASSESSMENT:    Personal Assessment 0-10 Scale (10 worst)    Anxiety:  7 (today and the past week)  Depression:  4 (no comment)  Cravings: 1 (no comment)     MSE within normal limits? Yes Affect: agitated  Mood: anxious  Pt Response:  open/receptive  Engaged in activity/Process and self-disclosed: adequate  Applies today's group topics to self: adequate  Able to give and receive feedback: adequate  Demonstrated insightful thinking: occasional and suboptimal  Other pt response comments:  Patient was a positive participant. They demonstrated a relatively pessimistic attitude, a strong degree of motivation, and moderate insight, as evidenced by his level of engagement combined with his reported efforts to alleviate boredom by engaging in  politically charged conversation. They seemed interested and attentive. They appeared to comprehend well the concepts being discussed. The patient seemed receptive of, and willing to implement, the ideas being discussed. Their thought process appeared linear and goal directed, and their speech was regular rate and rhythm.       Community Group Engagement:  No: not interested    Reported relapse since last meeting? No: no lapse    .  Office Visit on 01/09/2024   Component Date Value Ref Range Status    External Amphetamine Screen Urine 01/09/2024 Positive (A)   Final    External Benzodiazepine Screen Uri* 01/09/2024 Negative   Final    External Cocaine Screen Urine 01/09/2024 Negative   Final    External THC Screen Urine 01/09/2024 Negative   Final    External Methadone Screen Urine 01/09/2024 Negative   Final    External Methamphetamine Screen Ur* 01/09/2024 Negative   Final    External Oxycodone Screen Urine 01/09/2024 Negative   Final    External Buprenorphine Screen Urine 01/09/2024 Negative   Final    External MDMA 01/09/2024 Negative   Final    External Opiates Screen Urine 01/09/2024 Negative   Final   Office Visit on 01/02/2024   Component Date Value Ref Range Status    External Amphetamine Screen Urine 01/02/2024 Negative   Final    External Benzodiazepine Screen Uri* 01/02/2024 Negative   Final    External Cocaine Screen Urine 01/02/2024 Negative   Final    External THC Screen Urine 01/02/2024 Negative   Final    External Methadone Screen Urine 01/02/2024 Negative   Final    External Methamphetamine Screen Ur* 01/02/2024 Negative   Final    External Oxycodone Screen Urine 01/02/2024 Negative   Final    External Buprenorphine Screen Urine 01/02/2024 Negative   Final    External MDMA 01/02/2024 Negative   Final    External Opiates Screen Urine 01/02/2024 Negative   Final   Office Visit on 12/28/2023   Component Date Value Ref Range Status    External Amphetamine Screen Urine 12/28/2023 Negative   Final     External Benzodiazepine Screen Uri* 12/28/2023 Negative   Final    External Cocaine Screen Urine 12/28/2023 Negative   Final    External THC Screen Urine 12/28/2023 Negative   Final    External Methadone Screen Urine 12/28/2023 Negative   Final    External Methamphetamine Screen Ur* 12/28/2023 Negative   Final    External Oxycodone Screen Urine 12/28/2023 Negative   Final    External Buprenorphine Screen Urine 12/28/2023 Negative   Final    External MDMA 12/28/2023 Negative   Final    External Opiates Screen Urine 12/28/2023 Negative   Final       Impression/Formulation:    ICD-10-CM ICD-9-CM   1. Alcohol use disorder, severe, dependence  F10.20 303.90   2. Bipolar affective disorder, mixed  F31.60 296.60   3. Medication management  Z79.899 V58.69        CLINICAL MANEUVERING/INTERVENTIONS: Therapist utilized a person-centered approach to build and maintain rapport with group member.   Therapist promoted safe nonjudgmental environment by providing group members with unconditional positive regard.  Assisted member in processing above session content; acknowledged and normalized patient's thoughts, feelings and concerns.  Allowed patient to freely discuss issues without interruption or judgment. Provided safe, confidential environment to facilitate the development of positive therapeutic relationship and encourage open, honest communication. Therapist assisted group member with identifying and implementing healthier coping strategies and maintaining healthier boundaries. Assisted patient in identifying risk factors which would indicate the need for higher level of care including thoughts to harm self or others and/or self-harming behavior and encouraged patient to contact this office, call 911, or present to the nearest emergency room should any of these events occur. Pt agreeable to adhere to medication regimen as prescribed and report any side effects.  Discussed crisis intervention services and means to access.   Patient adamantly and convincingly denies current suicidal or homicidal ideation or perceptual disturbance.      Therapist implemented motivational interviewing techniques to assist client with exploring and resolving ambivalence associated with commitment to change behaviors.  Yes  Therapist utilized dialectical behavior techniques to teach and model emotional regulation and relaxation.  No   Therapist applied cognitive behavioral strategies to facilitate identification of maladaptive patterns of thinking and behavior.  Yes     PLAN:    Continue [facility] Phase III  Aftercare:  [facility] outpatient therapy     Please note that portions of this note were completed with a voice recognition program. Efforts were made to edit dictation, but occasionally words are mistranscribed.     This document signed by Mulugeta Santos LCSW, January 9, 2024, 14:28 EST

## 2024-01-24 ENCOUNTER — OFFICE VISIT (OUTPATIENT)
Dept: PSYCHIATRY | Facility: CLINIC | Age: 29
End: 2024-01-24
Payer: MEDICARE

## 2024-01-24 DIAGNOSIS — F31.60 BIPOLAR AFFECTIVE DISORDER, MIXED: ICD-10-CM

## 2024-01-24 DIAGNOSIS — Z79.899 MEDICATION MANAGEMENT: ICD-10-CM

## 2024-01-24 DIAGNOSIS — F10.20 ALCOHOL USE DISORDER, SEVERE, DEPENDENCE: Primary | ICD-10-CM

## 2024-01-24 PROCEDURE — 90853 GROUP PSYCHOTHERAPY: CPT | Performed by: SOCIAL WORKER

## 2024-01-24 NOTE — PROGRESS NOTES
NAME: Eduardo Cannon  DATE: 01/24/2024    [facility] Phase III  [id number]    Services Provided    Group Psychotherapy x 1  Education/Training x 0  Activity Therapy  x 0  Individual Therapy x 0 0 minutes  Family Therapy x 0  MD Initial Visit  x 0  MD Follow-Up   x 0    Number of participants: 5    DATA:  Patient reported these last few weeks had been okay. He expressed some frustration at group having been canceled last Tuesday due to weather, because he had chosen to miss a shift at work in order to attend group only to find out upon arrival that it had been canceled. The patient also shared about his gambling habit lately. He had deleted all but one application from his phone so far. He spent much of his check-in sharing about the gambling activities in which he had been participating lately.     Individual: No    Homework: None assigned    Group 1 (Psychotherapy: Check-ins): Therapist continued facilitation of rapport building strategies between group members. Therapist asked that each patient check in with home life and recovery efforts and identify triggers, cravings, and high risk situations that arise between group sessions.  Group members discussed barriers and benefits of attending recovery meetings.  Therapist provided empathy and support during group session. Daily Reading: None     ASSESSMENT:    Personal Assessment 0-10 Scale (10 worst)    Anxiety:  2 (no comment)  Depression:  0 (no comment)  Cravings: 0 (no comment)     MSE within normal limits? Yes Affect: agitated  Mood: anxious  Pt Response:  ambivalent  Engaged in activity/Process and self-disclosed: suboptimal  Applies today's group topics to self: suboptimal  Able to give and receive feedback: suboptimal  Demonstrated insightful thinking: occasional and suboptimal  Other pt response comments:  Patient was a positive participant. They demonstrated a relatively optimistic attitude, ambivalence, and moderate insight, as evidenced by his level of  engagement combined with his continued gambling . They seemed interested and distracted. They appeared to have a fair degree of comprehension regarding the concepts being discussed . The patient seemed doubtful of, and somewhat willing to implement, the ideas being discussed. Their thought process appeared linear and goal directed, and their speech was regular rate and rhythm.       Community Group Engagement:  No: not engaged    Reported relapse since last meeting? No: no lapse    .  Office Visit on 01/24/2024   Component Date Value Ref Range Status    External Amphetamine Screen Urine 01/24/2024 Negative   Final    External Benzodiazepine Screen Uri* 01/24/2024 Negative   Final    External Cocaine Screen Urine 01/24/2024 Negative   Final    External THC Screen Urine 01/24/2024 Negative   Final    External Methadone Screen Urine 01/24/2024 Negative   Final    External Methamphetamine Screen Ur* 01/24/2024 Negative   Final    External Oxycodone Screen Urine 01/24/2024 Negative   Final    External Buprenorphine Screen Urine 01/24/2024 Negative   Final    External MDMA 01/24/2024 Negative   Final    External Opiates Screen Urine 01/24/2024 Negative   Final   Office Visit on 01/09/2024   Component Date Value Ref Range Status    External Amphetamine Screen Urine 01/09/2024 Positive (A)   Final    External Benzodiazepine Screen Uri* 01/09/2024 Negative   Final    External Cocaine Screen Urine 01/09/2024 Negative   Final    External THC Screen Urine 01/09/2024 Negative   Final    External Methadone Screen Urine 01/09/2024 Negative   Final    External Methamphetamine Screen Ur* 01/09/2024 Negative   Final    External Oxycodone Screen Urine 01/09/2024 Negative   Final    External Buprenorphine Screen Urine 01/09/2024 Negative   Final    External MDMA 01/09/2024 Negative   Final    External Opiates Screen Urine 01/09/2024 Negative   Final       Impression/Formulation:    ICD-10-CM ICD-9-CM   1. Alcohol use disorder, severe,  dependence  F10.20 303.90   2. Bipolar affective disorder, mixed  F31.60 296.60   3. Medication management  Z79.899 V58.69        CLINICAL MANEUVERING/INTERVENTIONS: Therapist utilized a person-centered approach to build and maintain rapport with group member.   Therapist promoted safe nonjudgmental environment by providing group members with unconditional positive regard.  Assisted member in processing above session content; acknowledged and normalized patient's thoughts, feelings and concerns.  Allowed patient to freely discuss issues without interruption or judgment. Provided safe, confidential environment to facilitate the development of positive therapeutic relationship and encourage open, honest communication. Therapist assisted group member with identifying and implementing healthier coping strategies and maintaining healthier boundaries. Assisted patient in identifying risk factors which would indicate the need for higher level of care including thoughts to harm self or others and/or self-harming behavior and encouraged patient to contact this office, call 911, or present to the nearest emergency room should any of these events occur. Pt agreeable to adhere to medication regimen as prescribed and report any side effects.  Discussed crisis intervention services and means to access.  Patient adamantly and convincingly denies current suicidal or homicidal ideation or perceptual disturbance.      Therapist implemented motivational interviewing techniques to assist client with exploring and resolving ambivalence associated with commitment to change behaviors.  Yes  Therapist utilized dialectical behavior techniques to teach and model emotional regulation and relaxation.  No   Therapist applied cognitive behavioral strategies to facilitate identification of maladaptive patterns of thinking and behavior.  Yes     PLAN:    Continue [facility] Phase III  Aftercare:  [facility] outpatient  therapy      Please note that portions of this note were completed with a voice recognition program. Efforts were made to edit dictation, but occasionally words are mistranscribed.     This document signed by Mulugeta Santos LCSW, January 24, 2024, 14:56 EST

## 2024-01-30 ENCOUNTER — OFFICE VISIT (OUTPATIENT)
Dept: PSYCHIATRY | Facility: CLINIC | Age: 29
End: 2024-01-30
Payer: MEDICARE

## 2024-01-30 DIAGNOSIS — F10.20 ALCOHOL USE DISORDER, SEVERE, DEPENDENCE: Primary | ICD-10-CM

## 2024-01-30 DIAGNOSIS — Z79.899 MEDICATION MANAGEMENT: Primary | ICD-10-CM

## 2024-01-30 DIAGNOSIS — F31.60 BIPOLAR AFFECTIVE DISORDER, MIXED: ICD-10-CM

## 2024-01-30 PROCEDURE — 90853 GROUP PSYCHOTHERAPY: CPT | Performed by: SOCIAL WORKER

## 2024-01-30 NOTE — PROGRESS NOTES
"   NAME: Eduardo Cannon  DATE: 01/30/2024    [facility] Phase III  [id number]    Services Provided    Group Psychotherapy x 1  Education/Training x 0  Activity Therapy  x 0  Individual Therapy x 0 0 minutes  Family Therapy x 0  MD Initial Visit  x 0  MD Follow-Up   x 0    Number of participants: 3    DATA:  Patient came to group sharing political content. He reported having posted a picture of the Declaration of [place] on his social media, and for that he was called a terrorist. In regard to recovery, he reported doing pretty well. He had not been off work, so he hadn't had much time to do anything. In terms of gambling, he reported that he had kept apps off his phone. He had also created two bank accounts, one with 30% of his income and another with 70% of his income. This was in an effort to save money, and it seemed to be working well so far.     Patient also shared that when he found himself \"about to lose his shit\" at something or someone, he was reminded of the [place] analogy that  had been part of a bibliotherapy reading some week ago. It helped him to avoid making impulsive and self-destructive decisions.    Individual: No    Homework: None assigned    Group 1 (Psychotherapy: Check-ins): Therapist continued facilitation of rapport building strategies between group members. Therapist asked that each patient check in with home life and recovery efforts and identify triggers, cravings, and high risk situations that arise between group sessions.  Group members discussed barriers and benefits of attending recovery meetings.  Therapist provided empathy and support during group session. Daily Reading: None     ASSESSMENT:    Personal Assessment 0-10 Scale (10 worst)    Anxiety:  3 (no comment)  Depression:  0 (elevated mood; so-so on medication compliance; will continue with Dr. Perales, PCP)  Cravings: 0 (no comment)     MSE within normal limits? Yes Affect: euthymic Mood: anxious  Pt Response:  " open/receptive  Engaged in activity/Process and self-disclosed: adequate  Applies today's group topics to self: adequate  Able to give and receive feedback: adequate  Demonstrated insightful thinking: occasional and adequate  Other pt response comments:  Patient was a positive participant. They demonstrated a relatively optimistic attitude, a strong degree of motivation, and adequate insight, as evidenced by his level of engagement combined with his level of open-mindedness. They seemed interested and attentive. They appeared to comprehend well the concepts being discussed. The patient seemed receptive of, and willing to implement, the ideas being discussed. Their thought process appeared linear and goal directed, and their speech was regular rate and rhythm.       Community Group Engagement:  No: not interested    Reported relapse since last meeting? No: no lapse reported    .  Office Visit on 01/24/2024   Component Date Value Ref Range Status    External Amphetamine Screen Urine 01/24/2024 Negative   Final    External Benzodiazepine Screen Uri* 01/24/2024 Negative   Final    External Cocaine Screen Urine 01/24/2024 Negative   Final    External THC Screen Urine 01/24/2024 Negative   Final    External Methadone Screen Urine 01/24/2024 Negative   Final    External Methamphetamine Screen Ur* 01/24/2024 Negative   Final    External Oxycodone Screen Urine 01/24/2024 Negative   Final    External Buprenorphine Screen Urine 01/24/2024 Negative   Final    External MDMA 01/24/2024 Negative   Final    External Opiates Screen Urine 01/24/2024 Negative   Final       Impression/Formulation:    ICD-10-CM ICD-9-CM   1. Alcohol use disorder, severe, dependence  F10.20 303.90   2. Bipolar affective disorder, mixed  F31.60 296.60        CLINICAL MANEUVERING/INTERVENTIONS: Therapist utilized a person-centered approach to build and maintain rapport with group member.   Therapist promoted safe nonjudgmental environment by providing  group members with unconditional positive regard.  Assisted member in processing above session content; acknowledged and normalized patient's thoughts, feelings and concerns.  Allowed patient to freely discuss issues without interruption or judgment. Provided safe, confidential environment to facilitate the development of positive therapeutic relationship and encourage open, honest communication. Therapist assisted group member with identifying and implementing healthier coping strategies and maintaining healthier boundaries. Assisted patient in identifying risk factors which would indicate the need for higher level of care including thoughts to harm self or others and/or self-harming behavior and encouraged patient to contact this office, call 911, or present to the nearest emergency room should any of these events occur. Pt agreeable to adhere to medication regimen as prescribed and report any side effects.  Discussed crisis intervention services and means to access.  Patient adamantly and convincingly denies current suicidal or homicidal ideation or perceptual disturbance.      Therapist implemented motivational interviewing techniques to assist client with exploring and resolving ambivalence associated with commitment to change behaviors.  Yes  Therapist utilized dialectical behavior techniques to teach and model emotional regulation and relaxation.  No   Therapist applied cognitive behavioral strategies to facilitate identification of maladaptive patterns of thinking and behavior.  Yes     PLAN:    [facility]  Aftercare:  PCP     Please note that portions of this note were completed with a voice recognition program. Efforts were made to edit dictation, but occasionally words are mistranscribed.     This document signed by Mulugeta Santos LCSW, January 30, 2024, 14:25 EST

## 2024-02-06 ENCOUNTER — OFFICE VISIT (OUTPATIENT)
Dept: PSYCHIATRY | Facility: CLINIC | Age: 29
End: 2024-02-06
Payer: MEDICARE

## 2024-02-06 DIAGNOSIS — F31.60 BIPOLAR AFFECTIVE DISORDER, MIXED: ICD-10-CM

## 2024-02-06 DIAGNOSIS — Z79.899 MEDICATION MANAGEMENT: ICD-10-CM

## 2024-02-06 DIAGNOSIS — F10.20 ALCOHOL USE DISORDER, SEVERE, DEPENDENCE: Primary | ICD-10-CM

## 2024-02-06 PROCEDURE — 90853 GROUP PSYCHOTHERAPY: CPT | Performed by: SOCIAL WORKER

## 2024-02-06 NOTE — PROGRESS NOTES
"   NAME: Eduardo Cannon  DATE: 02/06/2024    [facility] Phase III  [id number]    Services Provided    Group Psychotherapy x 1  Education/Training x 0  Activity Therapy  x 0  Individual Therapy x 0 0 minutes  Family Therapy x 0  MD Initial Visit  x 0  MD Follow-Up   x 0    Number of participants: 4    DATA:  Patient reported he was doing pretty good today. He reported having had 2-3 \"shitty\" days last week. Part of this was due to his having taken a day off work but not having caught up on sleep at all. This Sunday, however, he was looking forward to spending time with a friend. He was also pleased to report that he had lost 25 pounds over the past 5 months or so. He attributed this to being sober and watching what his was eating.     Individual: No    Homework: None assigned    Group 1 (Psychotherapy: Check-ins): Therapist continued facilitation of rapport building strategies between group members. Therapist asked that each patient check in with home life and recovery efforts and identify triggers, cravings, and high risk situations that arise between group sessions.  Group members discussed barriers and benefits of attending recovery meetings.  Therapist provided empathy and support during group session. Daily Reading: None     ASSESSMENT:    Personal Assessment 0-10 Scale (10 worst)    Anxiety:  0 (no comment)  Depression:  0 (it has been slightly higher this past week)  Cravings: 0 (no comment)     MSE within normal limits? Yes Affect: somber Mood: calm  Pt Response:  open/receptive  Engaged in activity/Process and self-disclosed: adequate  Applies today's group topics to self: adequate  Able to give and receive feedback: adequate  Demonstrated insightful thinking: consistent and adequate  Other pt response comments:  Patient was a positive participant. They demonstrated a relatively positive attitude, a strong degree of motivation, and adequate insight, as evidenced by his level of engagement combined with his " abstinence from gambling lately. They seemed interested and attentive. They appeared to comprehend well the concepts being discussed. The patient seemed receptive of, and willing to implement, the ideas being discussed. Their thought process appeared linear and goal directed, and their speech was regular rate and rhythm.       Community Group Engagement:  No: not engaged    Reported relapse since last meeting? No: no lapse    .  Office Visit on 02/06/2024   Component Date Value Ref Range Status    External Amphetamine Screen Urine 02/06/2024 Positive (A)   Final    External Benzodiazepine Screen Uri* 02/06/2024 Negative   Final    External Cocaine Screen Urine 02/06/2024 Negative   Final    External THC Screen Urine 02/06/2024 Negative   Final    External Methadone Screen Urine 02/06/2024 Negative   Final    External Methamphetamine Screen Ur* 02/06/2024 Negative   Final    External Oxycodone Screen Urine 02/06/2024 Negative   Final    External Buprenorphine Screen Urine 02/06/2024 Negative   Final    External MDMA 02/06/2024 Negative   Final    External Opiates Screen Urine 02/06/2024 Negative   Final   Orders Only on 01/30/2024   Component Date Value Ref Range Status    External Amphetamine Screen Urine 01/30/2024 Negative   Final    External Benzodiazepine Screen Uri* 01/30/2024 Negative   Final    External Cocaine Screen Urine 01/30/2024 Negative   Final    External THC Screen Urine 01/30/2024 Negative   Final    External Methadone Screen Urine 01/30/2024 Negative   Final    External Methamphetamine Screen Ur* 01/30/2024 Negative   Final    External Oxycodone Screen Urine 01/30/2024 Negative   Final    External Buprenorphine Screen Urine 01/30/2024 Negative   Final    External MDMA 01/30/2024 Negative   Final    External Opiates Screen Urine 01/30/2024 Negative   Final   Office Visit on 01/24/2024   Component Date Value Ref Range Status    External Amphetamine Screen Urine 01/24/2024 Negative   Final    External  Benzodiazepine Screen Uri* 01/24/2024 Negative   Final    External Cocaine Screen Urine 01/24/2024 Negative   Final    External THC Screen Urine 01/24/2024 Negative   Final    External Methadone Screen Urine 01/24/2024 Negative   Final    External Methamphetamine Screen Ur* 01/24/2024 Negative   Final    External Oxycodone Screen Urine 01/24/2024 Negative   Final    External Buprenorphine Screen Urine 01/24/2024 Negative   Final    External MDMA 01/24/2024 Negative   Final    External Opiates Screen Urine 01/24/2024 Negative   Final       Impression/Formulation:    ICD-10-CM ICD-9-CM   1. Alcohol use disorder, severe, dependence  F10.20 303.90   2. Bipolar affective disorder, mixed  F31.60 296.60   3. Medication management  Z79.899 V58.69        CLINICAL MANEUVERING/INTERVENTIONS: Therapist utilized a person-centered approach to build and maintain rapport with group member.   Therapist promoted safe nonjudgmental environment by providing group members with unconditional positive regard.  Assisted member in processing above session content; acknowledged and normalized patient's thoughts, feelings and concerns.  Allowed patient to freely discuss issues without interruption or judgment. Provided safe, confidential environment to facilitate the development of positive therapeutic relationship and encourage open, honest communication. Therapist assisted group member with identifying and implementing healthier coping strategies and maintaining healthier boundaries. Assisted patient in identifying risk factors which would indicate the need for higher level of care including thoughts to harm self or others and/or self-harming behavior and encouraged patient to contact this office, call 911, or present to the nearest emergency room should any of these events occur. Pt agreeable to adhere to medication regimen as prescribed and report any side effects.  Discussed crisis intervention services and means to access.  Patient  adamantly and convincingly denies current suicidal or homicidal ideation or perceptual disturbance.      Therapist implemented motivational interviewing techniques to assist client with exploring and resolving ambivalence associated with commitment to change behaviors.  Yes  Therapist utilized dialectical behavior techniques to teach and model emotional regulation and relaxation.  No   Therapist applied cognitive behavioral strategies to facilitate identification of maladaptive patterns of thinking and behavior.  Yes     PLAN:    Continue [facility] Phase III  Aftercare:  PCP     Please note that portions of this note were completed with a voice recognition program. Efforts were made to edit dictation, but occasionally words are mistranscribed.     This document signed by Mulugeta Santos LCSW, February 6, 2024, 16:44 EST

## 2024-02-13 ENCOUNTER — OFFICE VISIT (OUTPATIENT)
Dept: PSYCHIATRY | Facility: CLINIC | Age: 29
End: 2024-02-13
Payer: MEDICARE

## 2024-02-13 DIAGNOSIS — F31.60 BIPOLAR AFFECTIVE DISORDER, MIXED: ICD-10-CM

## 2024-02-13 DIAGNOSIS — Z79.899 MEDICATION MANAGEMENT: ICD-10-CM

## 2024-02-13 DIAGNOSIS — F10.20 ALCOHOL USE DISORDER, SEVERE, DEPENDENCE: Primary | ICD-10-CM

## 2024-02-13 PROCEDURE — 90853 GROUP PSYCHOTHERAPY: CPT | Performed by: SOCIAL WORKER

## 2024-02-20 ENCOUNTER — OFFICE VISIT (OUTPATIENT)
Dept: PSYCHIATRY | Facility: CLINIC | Age: 29
End: 2024-02-20
Payer: MEDICARE

## 2024-02-20 DIAGNOSIS — F10.20 ALCOHOL USE DISORDER, SEVERE, DEPENDENCE: Primary | ICD-10-CM

## 2024-02-20 DIAGNOSIS — F31.60 BIPOLAR AFFECTIVE DISORDER, MIXED: ICD-10-CM

## 2024-02-20 DIAGNOSIS — Z79.899 MEDICATION MANAGEMENT: ICD-10-CM

## 2024-02-20 PROCEDURE — 90853 GROUP PSYCHOTHERAPY: CPT | Performed by: SOCIAL WORKER

## 2024-02-20 NOTE — PROGRESS NOTES
NAME: Eduardo Cannon  DATE: 02/20/2024    [facility] Phase III  [id number]    Services Provided    Group Psychotherapy x 1  Education/Training x 0  Activity Therapy  x 0  Individual Therapy x 0 0 minutes  Family Therapy x 0  MD Initial Visit  x 0  MD Follow-Up   x 0    Number of participants: 4    DATA:  Patient reported he was getting by today. He felt ready for graduation from the program. He supposed that now he would continue to work and plan to obtain his [employer] license sometime in the next six months. His goal was to [employer] after going to a trade school. He felt there would be good money in this kind of work.     Individual: No    Homework: None assigned    Group 1 (Psychotherapy: Check-ins): Therapist continued facilitation of rapport building strategies between group members. Therapist asked that each patient check in with home life and recovery efforts and identify triggers, cravings, and high risk situations that arise between group sessions.  Group members discussed barriers and benefits of attending recovery meetings.  Therapist provided empathy and support during group session. Daily Reading: None     ASSESSMENT:    Personal Assessment 0-10 Scale (10 worst)    Anxiety:  0 (no comment)  Depression:  0 (no comment)  Cravings: 0 (no comment)     MSE within normal limits? Yes Affect: euthymic Mood: calm  Pt Response:  open/receptive  Engaged in activity/Process and self-disclosed: adequate  Applies today's group topics to self: adequate  Able to give and receive feedback: adequate  Demonstrated insightful thinking: occasional and suboptimal  Other pt response comments:  Patient was a positive participant. They demonstrated a relatively positive attitude, a moderate degree of motivation, and moderate insight, as evidenced by his level of engagement combined with his vague sense of plans for the future. They seemed interested and attentive. They appeared to comprehend well the concepts being discussed.  The patient seemed receptive of, and somewhat willing to implement, the ideas being discussed. Their thought process appeared linear and goal directed, and their speech was regular rate and rhythm.       Community Group Engagement:  No: not engaged    Reported relapse since last meeting? No: no lapse    .  Office Visit on 02/20/2024   Component Date Value Ref Range Status    External Amphetamine Screen Urine 02/20/2024 Positive (A)   Final    External Benzodiazepine Screen Uri* 02/20/2024 Negative   Final    External Cocaine Screen Urine 02/20/2024 Negative   Final    External THC Screen Urine 02/20/2024 Negative   Final    External Methadone Screen Urine 02/20/2024 Negative   Final    External Methamphetamine Screen Ur* 02/20/2024 Negative   Final    External Oxycodone Screen Urine 02/20/2024 Negative   Final    External Buprenorphine Screen Urine 02/20/2024 Negative   Final    External MDMA 02/20/2024 Negative   Final    External Opiates Screen Urine 02/20/2024 Negative   Final   Office Visit on 02/13/2024   Component Date Value Ref Range Status    External Amphetamine Screen Urine 02/13/2024 Negative   Final    External Benzodiazepine Screen Uri* 02/13/2024 Negative   Final    External Cocaine Screen Urine 02/13/2024 Negative   Final    External THC Screen Urine 02/13/2024 Negative   Final    External Methadone Screen Urine 02/13/2024 Negative   Final    External Methamphetamine Screen Ur* 02/13/2024 Negative   Final    External Oxycodone Screen Urine 02/13/2024 Negative   Final    External Buprenorphine Screen Urine 02/13/2024 Negative   Final    External MDMA 02/13/2024 Negative   Final    External Opiates Screen Urine 02/13/2024 Negative   Final   Office Visit on 02/06/2024   Component Date Value Ref Range Status    External Amphetamine Screen Urine 02/06/2024 Positive (A)   Final    External Benzodiazepine Screen Uri* 02/06/2024 Negative   Final    External Cocaine Screen Urine 02/06/2024 Negative   Final     External THC Screen Urine 02/06/2024 Negative   Final    External Methadone Screen Urine 02/06/2024 Negative   Final    External Methamphetamine Screen Ur* 02/06/2024 Negative   Final    External Oxycodone Screen Urine 02/06/2024 Negative   Final    External Buprenorphine Screen Urine 02/06/2024 Negative   Final    External MDMA 02/06/2024 Negative   Final    External Opiates Screen Urine 02/06/2024 Negative   Final       Impression/Formulation:    ICD-10-CM ICD-9-CM   1. Alcohol use disorder, severe, dependence  F10.20 303.90   2. Bipolar affective disorder, mixed  F31.60 296.60   3. Medication management  Z79.899 V58.69        CLINICAL MANEUVERING/INTERVENTIONS: Therapist utilized a person-centered approach to build and maintain rapport with group member.   Therapist promoted safe nonjudgmental environment by providing group members with unconditional positive regard.  Assisted member in processing above session content; acknowledged and normalized patient's thoughts, feelings and concerns.  Allowed patient to freely discuss issues without interruption or judgment. Provided safe, confidential environment to facilitate the development of positive therapeutic relationship and encourage open, honest communication. Therapist assisted group member with identifying and implementing healthier coping strategies and maintaining healthier boundaries. Assisted patient in identifying risk factors which would indicate the need for higher level of care including thoughts to harm self or others and/or self-harming behavior and encouraged patient to contact this office, call 911, or present to the nearest emergency room should any of these events occur. Pt agreeable to adhere to medication regimen as prescribed and report any side effects.  Discussed crisis intervention services and means to access.  Patient adamantly and convincingly denies current suicidal or homicidal ideation or perceptual disturbance.      Therapist  implemented motivational interviewing techniques to assist client with exploring and resolving ambivalence associated with commitment to change behaviors.  Yes  Therapist utilized dialectical behavior techniques to teach and model emotional regulation and relaxation.  No   Therapist applied cognitive behavioral strategies to facilitate identification of maladaptive patterns of thinking and behavior.  Yes     PLAN:    Continue [facility] Phase  III  Aftercare:  PCP     Please note that portions of this note were completed with a voice recognition program. Efforts were made to edit dictation, but occasionally words are mistranscribed.     This document signed by Mulugeta Santos LCSW, February 20, 2024, 14:30 EST